# Patient Record
Sex: MALE | Race: WHITE | NOT HISPANIC OR LATINO | Employment: OTHER | URBAN - METROPOLITAN AREA
[De-identification: names, ages, dates, MRNs, and addresses within clinical notes are randomized per-mention and may not be internally consistent; named-entity substitution may affect disease eponyms.]

---

## 2017-06-19 ENCOUNTER — INITIAL CONSULT (OUTPATIENT)
Dept: SURGERY | Facility: CLINIC | Age: 67
End: 2017-06-19
Payer: COMMERCIAL

## 2017-06-19 ENCOUNTER — OFFICE VISIT (OUTPATIENT)
Dept: OTOLARYNGOLOGY | Facility: CLINIC | Age: 67
End: 2017-06-19
Payer: COMMERCIAL

## 2017-06-19 VITALS
SYSTOLIC BLOOD PRESSURE: 122 MMHG | TEMPERATURE: 97 F | BODY MASS INDEX: 33.04 KG/M2 | DIASTOLIC BLOOD PRESSURE: 77 MMHG | WEIGHT: 243.63 LBS | HEART RATE: 65 BPM

## 2017-06-19 VITALS
TEMPERATURE: 98 F | HEART RATE: 79 BPM | BODY MASS INDEX: 32.97 KG/M2 | WEIGHT: 243.38 LBS | DIASTOLIC BLOOD PRESSURE: 80 MMHG | SYSTOLIC BLOOD PRESSURE: 124 MMHG | HEIGHT: 72 IN

## 2017-06-19 DIAGNOSIS — I25.10 CORONARY ARTERY DISEASE INVOLVING NATIVE CORONARY ARTERY OF NATIVE HEART WITHOUT ANGINA PECTORIS: ICD-10-CM

## 2017-06-19 DIAGNOSIS — C15.5 MALIGNANT NEOPLASM OF LOWER THIRD OF ESOPHAGUS: ICD-10-CM

## 2017-06-19 DIAGNOSIS — E78.2 MIXED HYPERLIPIDEMIA: ICD-10-CM

## 2017-06-19 DIAGNOSIS — C15.5 MALIGNANT NEOPLASM OF LOWER THIRD OF ESOPHAGUS: Primary | ICD-10-CM

## 2017-06-19 DIAGNOSIS — I10 BENIGN ESSENTIAL HTN: ICD-10-CM

## 2017-06-19 PROBLEM — E11.9 TYPE 2 DIABETES MELLITUS WITHOUT COMPLICATION: Status: ACTIVE | Noted: 2017-06-19

## 2017-06-19 PROCEDURE — 1159F MED LIST DOCD IN RCRD: CPT | Mod: S$GLB,,, | Performed by: SURGERY

## 2017-06-19 PROCEDURE — 99999 PR PBB SHADOW E&M-EST. PATIENT-LVL III: CPT | Mod: PBBFAC,,, | Performed by: NURSE PRACTITIONER

## 2017-06-19 PROCEDURE — 1126F AMNT PAIN NOTED NONE PRSNT: CPT | Mod: S$GLB,,, | Performed by: SURGERY

## 2017-06-19 PROCEDURE — 31575 DIAGNOSTIC LARYNGOSCOPY: CPT | Mod: S$GLB,,, | Performed by: NURSE PRACTITIONER

## 2017-06-19 PROCEDURE — 99999 PR PBB SHADOW E&M-NEW PATIENT-LVL III: CPT | Mod: PBBFAC,,, | Performed by: SURGERY

## 2017-06-19 PROCEDURE — 1126F AMNT PAIN NOTED NONE PRSNT: CPT | Mod: S$GLB,,, | Performed by: NURSE PRACTITIONER

## 2017-06-19 PROCEDURE — 1159F MED LIST DOCD IN RCRD: CPT | Mod: S$GLB,,, | Performed by: NURSE PRACTITIONER

## 2017-06-19 PROCEDURE — 99203 OFFICE O/P NEW LOW 30 MIN: CPT | Mod: 25,S$GLB,, | Performed by: NURSE PRACTITIONER

## 2017-06-19 PROCEDURE — 99204 OFFICE O/P NEW MOD 45 MIN: CPT | Mod: S$GLB,,, | Performed by: SURGERY

## 2017-06-19 RX ORDER — ASPIRIN 81 MG/1
81 TABLET ORAL DAILY
Status: ON HOLD | COMMUNITY
End: 2017-07-24 | Stop reason: HOSPADM

## 2017-06-19 RX ORDER — LISINOPRIL AND HYDROCHLOROTHIAZIDE 12.5; 2 MG/1; MG/1
1 TABLET ORAL DAILY
Status: ON HOLD | COMMUNITY
End: 2017-07-24 | Stop reason: HOSPADM

## 2017-06-19 RX ORDER — AMLODIPINE BESYLATE 5 MG/1
5 TABLET ORAL DAILY
Status: ON HOLD | COMMUNITY
End: 2017-07-24 | Stop reason: HOSPADM

## 2017-06-19 RX ORDER — SAXAGLIPTIN AND METFORMIN HYDROCHLORIDE 2.5; 1 MG/1; MG/1
TABLET, FILM COATED, EXTENDED RELEASE ORAL
Status: ON HOLD | COMMUNITY
Start: 2017-04-04 | End: 2017-07-24 | Stop reason: HOSPADM

## 2017-06-19 RX ORDER — SIMVASTATIN 20 MG/1
20 TABLET, FILM COATED ORAL NIGHTLY
Status: ON HOLD | COMMUNITY
End: 2017-07-24 | Stop reason: HOSPADM

## 2017-06-19 RX ORDER — METOPROLOL SUCCINATE 100 MG/1
100 TABLET, EXTENDED RELEASE ORAL DAILY
COMMUNITY
Start: 2017-03-22 | End: 2017-09-11

## 2017-06-19 RX ORDER — PANTOPRAZOLE SODIUM 40 MG/1
40 TABLET, DELAYED RELEASE ORAL DAILY
Status: ON HOLD | COMMUNITY
End: 2017-07-24 | Stop reason: HOSPADM

## 2017-06-19 RX ORDER — PRASUGREL HYDROCHLORIDE 10 MG/1
TABLET, COATED ORAL
Status: ON HOLD | COMMUNITY
Start: 2017-03-22 | End: 2017-07-24 | Stop reason: HOSPADM

## 2017-06-19 NOTE — LETTER
June 19, 2017      George Padilla MD  [address]  [facility] [address]           [facility] - Head/Neck Surg Onc  [address]  [facility] [address]  Phone: [phone]  Fax: [phone]          Patient: Mikie Walter Jr   MR Number: [id number]   YOB: 1950   Date of Visit: 6/19/2017       Dear Dr. George Padilla:    Thank you for referring Mikie Walter Jr to me for evaluation. Attached you will find relevant portions of my assessment and plan of care.    If you have questions, please do not hesitate to call me. I look forward to following Mikie Walter Jr along with you.    Sincerely,    Shagufta Her, NP    Enclosure  CC:  No Recipients    If you would like to receive this communication electronically, please contact [email or web address] or [phone] to request more information on [employer] access.    For providers and/or their staff who would like to refer a patient to [facility], please contact us through our one-stop-shop provider referral line, [facility] [place], at [phone].    If you feel you have received this communication in error or would no longer like to receive these types of communications, please e-mail [email or web address]

## 2017-06-19 NOTE — PROGRESS NOTES
Subjective:       Patient ID: Mikie Walter Jr is a 66 y.o. male.    Chief Complaint: consult/ check vocal cords    HPI     Mikie Walter Jr is a 66 year old male who was referred by Dr. Padilla for a vocal cord evaluation. He recently completed neoadjuvant chemoradiation therapy for esophageal adenocarcinoma. He is scheduled to undergo esophagectomy with Dr. Padilla in July. He has no voice complaints. He denies hoarse voice. He has esophageal dysphagia, but no pharyngeal dysphagia or odynophagia.     History reviewed. No pertinent past medical history.    History reviewed. No pertinent surgical history.      Current Outpatient Prescriptions:     amlodipine (NORVASC) 5 MG tablet, Take 5 mg by mouth once daily., Disp: , Rfl:     aspirin (ECOTRIN) 81 MG EC tablet, Take 81 mg by mouth once daily., Disp: , Rfl:     EFFIENT 10 mg Tab, , Disp: , Rfl:     KOMBIGLYZE XR 2.5-1,000 mg TM24, , Disp: , Rfl:     liraglutide 0.6 mg/0.1 mL, 18 mg/3 mL, subq PNIJ (VICTOZA 2-RUTH) 0.6 mg/0.1 mL (18 mg/3 mL) PnIj, Inject 0.6 mg into the skin once daily., Disp: , Rfl:     lisinopril-hydrochlorothiazide (PRINZIDE,ZESTORETIC) 20-12.5 mg per tablet, Take 1 tablet by mouth once daily., Disp: , Rfl:     metoprolol succinate (TOPROL-XL) 100 MG 24 hr tablet, , Disp: , Rfl:     pantoprazole (PROTONIX) 40 MG tablet, Take 40 mg by mouth once daily., Disp: , Rfl:     simvastatin (ZOCOR) 20 MG tablet, Take 20 mg by mouth every evening., Disp: , Rfl:     Review of patient's allergies indicates:   Allergen Reactions    Ciprofloxacin Hives and Swelling       Social History     Social History    Marital status:      Spouse name: N/A    Number of children: N/A    Years of education: N/A     Occupational History    Not on file.     Social History Main Topics    Smoking status: Former Smoker     Types: Cigarettes     Quit date: 1/1/1965    Smokeless tobacco: Not on file    Alcohol use 3.6 oz/week     6 Cans of beer per week     Drug use: Unknown    Sexual activity: Not on file     Other Topics Concern    Not on file     Social History Narrative    No narrative on file       History reviewed. No pertinent family history.    Review of Systems   Constitutional: Negative for appetite change, chills, diaphoresis, fatigue, fever and unexpected weight change.   HENT: Negative for congestion, dental problem, drooling, ear discharge, ear pain, facial swelling, hearing loss, mouth sores, nosebleeds, postnasal drip, rhinorrhea, sinus pressure, sneezing, sore throat, tinnitus, trouble swallowing and voice change.    Eyes: Negative for pain, discharge, redness and itching.   Respiratory: Negative for cough and shortness of breath.    Cardiovascular: Negative for chest pain.   Gastrointestinal: Negative for abdominal distention, abdominal pain, diarrhea, nausea and vomiting.   Endocrine: Negative for cold intolerance and heat intolerance.   Genitourinary: Negative for difficulty urinating.   Musculoskeletal: Negative for neck pain and neck stiffness.   Skin: Negative for rash.   Neurological: Negative for dizziness, weakness and headaches.   Hematological: Negative for adenopathy.       Objective:      Physical Exam   Constitutional: He is oriented to person, place, and time. He appears well-developed and well-nourished. He is cooperative. He does not appear ill. No distress.   HENT:   Head: Normocephalic and atraumatic.   Right Ear: Hearing and external ear normal.   Left Ear: Hearing and external ear normal.   Nose: Nose normal. No mucosal edema, rhinorrhea or nasal deformity. No epistaxis.  No foreign bodies. Right sinus exhibits no maxillary sinus tenderness and no frontal sinus tenderness. Left sinus exhibits no maxillary sinus tenderness and no frontal sinus tenderness.   Mouth/Throat: Uvula is midline, oropharynx is clear and moist and mucous membranes are normal. Mucous membranes are not pale, not dry and not cyanotic. He does not have  dentures. No oral lesions. No trismus in the jaw. Normal dentition. No uvula swelling or dental caries. No oropharyngeal exudate, posterior oropharyngeal edema, posterior oropharyngeal erythema or tonsillar abscesses.   Procedure: Flexible laryngoscopy  In order to fully examine the upper aerodigestive tract, including the larynx, in a patient with a hyperactive gag reflex, flexible endoscopy is required.  After explaining the procedure and obtaining verbal consent, a timeout was performed with the patient's participation according to the universal protocol. Both nasal cavities were anesthetized with 4% Xylocaine spray mixed with Matthew-Synephrine. The flexible laryngoscope (#[id number]) was inserted into the nasal cavity and advanced to visualize the nasal cavity, nasopharynx, the posterior oropharynx, hypopharynx, and the endolarynx with the above findings noted. The scope was removed and the procedure terminated. The patient tolerated this procedure well without apparent complication.      FINDINGS  Nasopharynx - the torus is clear. There are no lesions of the posterior wall.   Oropharynx - no lesions of the tongue base. There is no obvious fullness or asymmetry.  Hypopharynx - there are no lesions of the pyriform sinuses or postcricoid region    Larynx - there are no lesions of the supraglottic or glottic larynx. Vocal fold mobility is normal with complete closure.      Eyes: Conjunctivae are normal. Right eye exhibits no discharge. Left eye exhibits no discharge. No scleral icterus.   Neck: Trachea normal, normal range of motion and phonation normal. Neck supple. No JVD present. No tracheal tenderness present. No tracheal deviation present. No thyroid mass and no thyromegaly present.   Salivary glands - there are no lesions or asymmetric findings in the submandibular or parotid glands     Cardiovascular: Normal rate.    Pulmonary/Chest: Effort normal. No stridor. No respiratory distress.   Lymphadenopathy:         Head (right side): No submental, no submandibular, no tonsillar and no preauricular adenopathy present.        Head (left side): No submental, no submandibular, no tonsillar and no preauricular adenopathy present.     He has no cervical adenopathy.   Neurological: He is alert and oriented to person, place, and time. No cranial nerve deficit.   Skin: Skin is warm and dry. No rash noted. He is not diaphoretic. No erythema. No pallor.   Psychiatric: He has a normal mood and affect. His behavior is normal. Thought content normal.   Vitals reviewed.      Assessment:       1. Malignant neoplasm of lower third of esophagus        Plan:       Mikie Walter Jr has normal vocal cord function.  No vocal cord abnormalities are seen on flexible laryngoscopy.  We discussed the possibility of injury to the recurrent laryngeal nerve during surgery.  If he has any issues with his voice post op, he should follow up with our laryngologist, Dr. Frederick Post. He verbalized understanding.  Questions answered. He will RTC prn.

## 2017-06-19 NOTE — PROGRESS NOTES
History & Physical    SUBJECTIVE:     History of Present Illness:  Mr. Walter is a 66 y.o. male with a history of DM, HTN, CAD s/p CABG x 3 in 2011 with subsequent stent placement for bypass failure, kidney cancer s/p left nephrectomy in 11/2016, chronic GERD, and esophageal adenocarcinoma, diagnosed in 10/2016. He is s/p neoadjuvant chemoradiation, last dose of radiation on 4/25/17. He first presented back in 10/2016 with c/o 2 months of progressive dysphagia. EGD/EUS at that time revealed a tumor at the esophagogastric junction, staged T2 by ultrasound.     Today, he is doing quite well. He claims that he has now fully recovered from his radiation therapy, as he is at his normal activity level and going to work each day. His dysphagia has significantly improved since his last EGD on 6/7, as he is now keeping down both solids and liquids, without regurgitation. His appetite has improved, but he has lost a total of 20 pounds since he started neoadjuvant therapy, including 2 pounds in the past 1-2 weeks. However, he claims that this most recent weight loss is not unintentional, as he has started eating a healthier diet due to high blood glucose levels. He denies any fatigue, weakness, pain, or adenopathy.     Chief Complaint   Patient presents with    Consult       Review of patient's allergies indicates:   Allergen Reactions    Ciprofloxacin Hives and Swelling       Current Outpatient Prescriptions   Medication Sig Dispense Refill    amlodipine (NORVASC) 5 MG tablet Take 5 mg by mouth once daily.      aspirin (ECOTRIN) 81 MG EC tablet Take 81 mg by mouth once daily.      EFFIENT 10 mg Tab       KOMBIGLYZE XR 2.5-1,000 mg TM24       liraglutide 0.6 mg/0.1 mL, 18 mg/3 mL, subq PNIJ (VICTOZA 2-RUTH) 0.6 mg/0.1 mL (18 mg/3 mL) PnIj Inject 0.6 mg into the skin once daily.      lisinopril-hydrochlorothiazide (PRINZIDE,ZESTORETIC) 20-12.5 mg per tablet Take 1 tablet by mouth once daily.      metoprolol succinate  (TOPROL-XL) 100 MG 24 hr tablet       pantoprazole (PROTONIX) 40 MG tablet Take 40 mg by mouth once daily.      simvastatin (ZOCOR) 20 MG tablet Take 20 mg by mouth every evening.       No current facility-administered medications for this visit.        No past medical history on file.  No past surgical history on file.  No family history on file.  Social History   Substance Use Topics    Smoking status: Former Smoker     Types: Cigarettes     Quit date: 1/1/1965    Smokeless tobacco: Not on file    Alcohol use 3.6 oz/week     6 Cans of beer per week        Review of Systems:  Review of Systems   Constitutional: Positive for unexpected weight change (20 pounds ). Negative for activity change, appetite change, chills, fatigue and fever.   Eyes: Negative for visual disturbance.   Respiratory: Negative for cough, chest tightness and shortness of breath.    Cardiovascular: Negative for chest pain and palpitations.   Gastrointestinal: Negative for abdominal distention, abdominal pain, constipation, diarrhea, nausea and vomiting.   Genitourinary: Negative for difficulty urinating and dysuria.   Skin: Negative for color change.   Neurological: Negative for dizziness, weakness and light-headedness.   Hematological: Negative for adenopathy.       OBJECTIVE:     Vital Signs (Most Recent)  Temp: 97.8 °F (36.6 °C) (06/19/17 1427)  Pulse: 79 (06/19/17 1427)  BP: 124/80 (06/19/17 1427)  6' (1.829 m)  110.4 kg (243 lb 6.2 oz)     Physical Exam:  Physical Exam   Constitutional: He is oriented to person, place, and time. He appears well-developed and well-nourished. No distress.   HENT:   Head: Normocephalic and atraumatic.   Eyes: Conjunctivae are normal.   Neck: Normal range of motion. Neck supple.   Cardiovascular: Normal rate and regular rhythm.    Pulmonary/Chest: Effort normal and breath sounds normal.   Abdominal: Soft. He exhibits no distension. There is no tenderness. There is no rebound.   Neurological: He is alert  and oriented to person, place, and time.   Skin: Skin is warm and dry.   Psychiatric: He has a normal mood and affect.       Diagnostic Results:  Multiple EGD/EUS reports reviewed - Post neoadjuvant therapy studies show evidence of significant response to treatment.   Most recent CT/PET scans reveal no significant masses or lymphatic involvement.      ASSESSMENT/PLAN:     Pt is a 67 yo M with an esophageal adenocarcinoma, originally described as T2 buy EUS; now status post neoadjuvant chemoradiation.     PLAN:Plan     - We will plan to proceed with a robotic-assisted esophagectomy. Mr. Walter has asked that we delay scheduling this until after July 15th, as his son will be  on that day. We discussed the risks associated with waiting until ~12 weeks after the conclusion radiation therapy, including increased difficulty with the dissection due to post-radiation fibrosis. However, given the importance of the occasion, we feel that it will be acceptable to wait until the earliest possible date after 7/15. He understands this risk, as well as the usual risks associated with the procedure, which we have discussed in detail, and he has agreed to proceed.        I have personally taken the history and examined this patient and agree with the resident's note as stated above.  In addition:  PMH + for HTN, CAD as noted above with CABG followed by PCI and stenting x 4, and non-insulin dependent DM2. His last visit with his cardiologist, Dr Hurley, was in January and he was told he was doing well. He tells me his HTN is very well-controlled, and his Hgb A1c is usually in the 6-7 range. He is quite active physically (> 4 METs) with no angina, CAMACHO, or claudication.   His neoadjuvant treatment has consisted of two cycles of carbo/taxol initially, followed by three concurrent cycles of carbo/taxol with XRT.   His left nephrectomy was performed through a left flank incision.  Outside records and imaging studies personally  reviewed.   His son is getting  in [place] on July 15th.  Overall, I think he is a good candidate for surgical resection and we will plan RATLE immediately after his son's wedding. I have recommended a progressive exercise program, starting now. We will obtain records from Dr Hurley.  Long talk with patient and his wife and son.     Copy Dr Bunn

## 2017-06-19 NOTE — LETTER
June 19, 2017      Jeff Bunn MD  [address]   [address]  [place] [address]           [facility] Surg/S[facility] Onc  [address] [facility] [address] [facility] [address]  Phone: [phone]          Patient: Mikie Walter Jr   MR Number: [id number]   YOB: 1950   Date of Visit: 6/19/2017       Dear Dr. Jeff Bunn:    Thank you for referring Mikie Walter Jr to me for evaluation. Attached you will find relevant portions of my assessment and plan of care.    If you have questions, please do not hesitate to call me. I look forward to following Mikie Walter Jr along with you.    Sincerely,    George Padilla MD    Enclosure  CC:  No Recipients    If you would like to receive this communication electronically, please contact [email or web address] or [phone] to request more information on [employer] Link access.    For providers and/or their staff who would like to refer a patient to [facility], please contact us through our one-stop-shop provider referral line, [facility], at [phone].    If you feel you have received this communication in error or would no longer like to receive these types of communications, please e-mail [email or web address]

## 2017-06-20 DIAGNOSIS — C15.5 MALIGNANT NEOPLASM OF LOWER THIRD OF ESOPHAGUS: Primary | ICD-10-CM

## 2017-07-10 ENCOUNTER — OFFICE VISIT (OUTPATIENT)
Dept: SURGERY | Facility: CLINIC | Age: 67
End: 2017-07-10
Payer: COMMERCIAL

## 2017-07-10 ENCOUNTER — RESEARCH ENCOUNTER (OUTPATIENT)
Dept: RESEARCH | Facility: HOSPITAL | Age: 67
End: 2017-07-10

## 2017-07-10 ENCOUNTER — LAB VISIT (OUTPATIENT)
Dept: LAB | Facility: HOSPITAL | Age: 67
End: 2017-07-10
Attending: SURGERY
Payer: COMMERCIAL

## 2017-07-10 VITALS
BODY MASS INDEX: 32.69 KG/M2 | RESPIRATION RATE: 18 BRPM | WEIGHT: 241.38 LBS | SYSTOLIC BLOOD PRESSURE: 115 MMHG | TEMPERATURE: 97 F | DIASTOLIC BLOOD PRESSURE: 78 MMHG | HEIGHT: 72 IN | HEART RATE: 78 BPM

## 2017-07-10 DIAGNOSIS — I12.9 SECONDARY DM WITH CKD STAGE 4 AND HYPERTENSION: ICD-10-CM

## 2017-07-10 DIAGNOSIS — E13.22 SECONDARY DM WITH CKD STAGE 4 AND HYPERTENSION: ICD-10-CM

## 2017-07-10 DIAGNOSIS — C15.5 MALIGNANT NEOPLASM OF LOWER THIRD OF ESOPHAGUS: ICD-10-CM

## 2017-07-10 DIAGNOSIS — C15.5 MALIGNANT NEOPLASM OF LOWER THIRD OF ESOPHAGUS: Primary | ICD-10-CM

## 2017-07-10 DIAGNOSIS — N18.4 SECONDARY DM WITH CKD STAGE 4 AND HYPERTENSION: ICD-10-CM

## 2017-07-10 LAB
ALBUMIN SERPL BCP-MCNC: 4 G/DL
ALP SERPL-CCNC: 70 U/L
ALT SERPL W/O P-5'-P-CCNC: 17 U/L
ANION GAP SERPL CALC-SCNC: 9 MMOL/L
AST SERPL-CCNC: 17 U/L
BASOPHILS # BLD AUTO: 0.02 K/UL
BASOPHILS NFR BLD: 0.5 %
BILIRUB SERPL-MCNC: 0.5 MG/DL
BUN SERPL-MCNC: 32 MG/DL
CALCIUM SERPL-MCNC: 9.5 MG/DL
CHLORIDE SERPL-SCNC: 103 MMOL/L
CO2 SERPL-SCNC: 26 MMOL/L
CREAT SERPL-MCNC: 2 MG/DL
DIFFERENTIAL METHOD: ABNORMAL
EOSINOPHIL # BLD AUTO: 0.1 K/UL
EOSINOPHIL NFR BLD: 2.5 %
ERYTHROCYTE [DISTWIDTH] IN BLOOD BY AUTOMATED COUNT: 12.7 %
EST. GFR  (AFRICAN AMERICAN): 38.8 ML/MIN/1.73 M^2
EST. GFR  (NON AFRICAN AMERICAN): 33.5 ML/MIN/1.73 M^2
GLUCOSE SERPL-MCNC: 139 MG/DL
HCT VFR BLD AUTO: 38.7 %
HGB BLD-MCNC: 13.3 G/DL
LYMPHOCYTES # BLD AUTO: 1.1 K/UL
LYMPHOCYTES NFR BLD: 27.6 %
MCH RBC QN AUTO: 31.8 PG
MCHC RBC AUTO-ENTMCNC: 34.4 %
MCV RBC AUTO: 93 FL
MONOCYTES # BLD AUTO: 0.4 K/UL
MONOCYTES NFR BLD: 10 %
NEUTROPHILS # BLD AUTO: 2.4 K/UL
NEUTROPHILS NFR BLD: 59.2 %
PLATELET # BLD AUTO: 157 K/UL
PMV BLD AUTO: 9.3 FL
POTASSIUM SERPL-SCNC: 4.6 MMOL/L
PREALB SERPL-MCNC: 34 MG/DL
PROT SERPL-MCNC: 7.5 G/DL
RBC # BLD AUTO: 4.18 M/UL
SODIUM SERPL-SCNC: 138 MMOL/L
WBC # BLD AUTO: 4.02 K/UL

## 2017-07-10 PROCEDURE — 99213 OFFICE O/P EST LOW 20 MIN: CPT | Mod: S$GLB,,, | Performed by: SURGERY

## 2017-07-10 PROCEDURE — 1126F AMNT PAIN NOTED NONE PRSNT: CPT | Mod: S$GLB,,, | Performed by: SURGERY

## 2017-07-10 PROCEDURE — 36415 COLL VENOUS BLD VENIPUNCTURE: CPT

## 2017-07-10 PROCEDURE — 99999 PR PBB SHADOW E&M-EST. PATIENT-LVL III: CPT | Mod: PBBFAC,,, | Performed by: SURGERY

## 2017-07-10 PROCEDURE — 80053 COMPREHEN METABOLIC PANEL: CPT

## 2017-07-10 PROCEDURE — 1159F MED LIST DOCD IN RCRD: CPT | Mod: S$GLB,,, | Performed by: SURGERY

## 2017-07-10 PROCEDURE — 85025 COMPLETE CBC W/AUTO DIFF WBC: CPT

## 2017-07-10 PROCEDURE — 84134 ASSAY OF PREALBUMIN: CPT

## 2017-07-10 NOTE — PROGRESS NOTES
History & Physical  Surgery      SUBJECTIVE:     Chief Complaint/Reason for Admission: Pre Op Visit    History of Present Illness: Mr. Walter is a 66 y.o. male with a history of DM, HTN, CAD s/p CABG x 3 in 2011 with subsequent stent placement for bypass failure, kidney cancer s/p left nephrectomy in 11/2016, chronic GERD, and esophageal adenocarcinoma, diagnosed in 10/2016. He is s/p neoadjuvant chemoradiation, last dose of radiation on 4/25/17. He first presented back in 10/2016 with c/o 2 months of progressive dysphagia. EGD/EUS at that time revealed a tumor at the esophagogastric junction, staged T2 by ultrasound.      Pt states he has been recovering well from his neoadjuvant tx, denies severe symptoms except mild dysphagia without regurgitation. Feeling much better since completing neoadj tx.    Denies Cp, angina, SOB, N/V, D/C, or abdominal pain. Compliant with medications at home.         Current Outpatient Prescriptions on File Prior to Visit   Medication Sig    amlodipine (NORVASC) 5 MG tablet Take 5 mg by mouth once daily.    aspirin (ECOTRIN) 81 MG EC tablet Take 81 mg by mouth once daily.    KOMBIGLYZE XR 2.5-1,000 mg TM24     liraglutide 0.6 mg/0.1 mL, 18 mg/3 mL, subq PNIJ (VICTOZA 2-RUTH) 0.6 mg/0.1 mL (18 mg/3 mL) PnIj Inject 0.6 mg into the skin once daily.    lisinopril-hydrochlorothiazide (PRINZIDE,ZESTORETIC) 20-12.5 mg per tablet Take 1 tablet by mouth once daily.    metoprolol succinate (TOPROL-XL) 100 MG 24 hr tablet     pantoprazole (PROTONIX) 40 MG tablet Take 40 mg by mouth once daily.    simvastatin (ZOCOR) 20 MG tablet Take 20 mg by mouth every evening.    EFFIENT 10 mg Tab      No current facility-administered medications on file prior to visit.        Review of patient's allergies indicates:   Allergen Reactions    Ciprofloxacin Hives and Swelling       No past medical history on file.  No past surgical history on file.  No family history on file.  Social History   Substance  Use Topics    Smoking status: Former Smoker     Types: Cigarettes     Quit date: 1/1/1965    Smokeless tobacco: Not on file    Alcohol use 3.6 oz/week     6 Cans of beer per week        Review of Systems   Constitutional: Negative for appetite change, fatigue and fever.   HENT: Positive for trouble swallowing.    Respiratory: Negative for cough, choking, chest tightness and shortness of breath.    Cardiovascular: Negative for chest pain and palpitations.   Gastrointestinal: Negative for abdominal distention, abdominal pain, constipation, diarrhea, nausea and vomiting.   Skin: Negative for color change and rash.     OBJECTIVE:     Vital Signs (Most Recent)  Temp: 97.2 °F (36.2 °C) (07/10/17 1307)  Pulse: 78 (07/10/17 1307)  Resp: 18 (07/10/17 1307)  BP: 115/78 (07/10/17 1307)    Physical Exam   Constitutional: He is oriented to person, place, and time. He appears well-developed and well-nourished. No distress.   Eyes: EOM are normal. Pupils are equal, round, and reactive to light.   Cardiovascular: Normal rate and regular rhythm.    Pulmonary/Chest: Effort normal and breath sounds normal. No respiratory distress.   Abdominal: Soft. Bowel sounds are normal. He exhibits no distension. There is no tenderness. There is no guarding.   Neurological: He is alert and oriented to person, place, and time.   Skin: Skin is warm and dry.         ASSESSMENT/PLAN:     A/P:  66 w/o man with uT2N0 esophageal adenocarcinoma s/p neoadjuvant chemoradiation    - Schedule for Robotic esophagectomy with Dr. Padilla 7/18/2017  - Pt to see his cardiologist tomorrow for pre operative apt.    Nayan Carlson MD   [phone]  General Surgery [facility]      I have personally taken the history and examined this patient and agree with the resident's note as stated above.  Operative plan discussed with Mr Walter and his wife. They understand and agree

## 2017-07-10 NOTE — PROGRESS NOTES
Pt and one family member were approached by Xiao Fair in clinic regarding participation in [employer]'s [employer] program (IRB#[id number]). Pt was agreeable.   The ICF was reviewed with pt. The discussion included:   - participation is voluntary;   - pt can change his mind about participating at any time;   - if he changes his mind about participation, he can call us at contact info in ICF and we will discard samples remaining;   - samples that have been used prior to his notification will still be included in research;   - specimens collected will include blood, urine and tissue;   - blood and urine will be collected pre-operatively if possible;   - tissue will be collected from Pathology after routine tests have been conducted;   - Dr. Padilla will perform procedure per his usual protocol - participation in [employer] program will not change amount of tissue removed;   - specimens will be stored with unique code that can only be linked to pt by [employer] staff;   - all medical information released to researchers will be stripped of identifiers;   - samples will not be released to outside researchers unless approved by internal committee;   - there is a small risk of loss of confidentiality, but we make every effort to ensure privacy;   - no other physical risks outside of those involved in standard of care procedure.     Pt did not have any questions. Pt willingly and independently signed ICF for [employer]. A copy of signed ICF was given to pt with instructions to call with any questions that may arise or if he should change his mind regarding participation in [employer] program.

## 2017-07-11 ENCOUNTER — TELEPHONE (OUTPATIENT)
Dept: SURGERY | Facility: CLINIC | Age: 67
End: 2017-07-11

## 2017-07-11 NOTE — TELEPHONE ENCOUNTER
Left message on voicemail for pt. to call back      ---- Message from Claude Martinez sent at 7/11/2017 11:00 AM CDT -----  Contact: Swati//Wife  Caller states that she needs to speak with nurse in ref to the paperwork that supposed to be sent over to the pts cardiologists//please call back at [phone]//thank you    Attn: Jennifer  Fax: [phone]

## 2017-07-17 ENCOUNTER — TELEPHONE (OUTPATIENT)
Dept: SURGERY | Facility: CLINIC | Age: 67
End: 2017-07-17

## 2017-07-17 ENCOUNTER — ANESTHESIA EVENT (OUTPATIENT)
Dept: SURGERY | Facility: HOSPITAL | Age: 67
DRG: 328 | End: 2017-07-17
Payer: MEDICARE

## 2017-07-17 DIAGNOSIS — C15.5 MALIGNANT NEOPLASM OF LOWER THIRD OF ESOPHAGUS: Primary | ICD-10-CM

## 2017-07-17 RX ORDER — METOPROLOL TARTRATE 25 MG/1
50 TABLET, FILM COATED ORAL 2 TIMES DAILY
Status: CANCELLED | OUTPATIENT
Start: 2017-07-17

## 2017-07-17 RX ORDER — ENOXAPARIN SODIUM 100 MG/ML
40 INJECTION SUBCUTANEOUS EVERY 24 HOURS
Status: CANCELLED | OUTPATIENT
Start: 2017-07-17

## 2017-07-17 RX ORDER — SODIUM CHLORIDE 9 MG/ML
INJECTION, SOLUTION INTRAVENOUS CONTINUOUS
Status: CANCELLED | OUTPATIENT
Start: 2017-07-17

## 2017-07-18 ENCOUNTER — SURGERY (OUTPATIENT)
Age: 67
End: 2017-07-18

## 2017-07-18 ENCOUNTER — HOSPITAL ENCOUNTER (INPATIENT)
Facility: HOSPITAL | Age: 67
LOS: 6 days | Discharge: HOME HEALTH | DRG: 328 | End: 2017-07-24
Attending: SURGERY | Admitting: SURGERY
Payer: MEDICARE

## 2017-07-18 ENCOUNTER — ANESTHESIA (OUTPATIENT)
Dept: SURGERY | Facility: HOSPITAL | Age: 67
DRG: 328 | End: 2017-07-18
Payer: MEDICARE

## 2017-07-18 DIAGNOSIS — N18.30 CKD (CHRONIC KIDNEY DISEASE), STAGE III: ICD-10-CM

## 2017-07-18 DIAGNOSIS — Z98.890 H/O ESOPHAGECTOMY: ICD-10-CM

## 2017-07-18 DIAGNOSIS — I25.10 CORONARY ARTERY DISEASE INVOLVING NATIVE CORONARY ARTERY OF NATIVE HEART WITHOUT ANGINA PECTORIS: ICD-10-CM

## 2017-07-18 DIAGNOSIS — Z90.49 H/O ESOPHAGECTOMY: ICD-10-CM

## 2017-07-18 DIAGNOSIS — E11.42 TYPE 2 DIABETES MELLITUS WITH POLYNEUROPATHY: ICD-10-CM

## 2017-07-18 DIAGNOSIS — Z78.9 ON ENTERAL NUTRITION: ICD-10-CM

## 2017-07-18 DIAGNOSIS — C15.5 MALIGNANT NEOPLASM OF LOWER THIRD OF ESOPHAGUS: Primary | ICD-10-CM

## 2017-07-18 LAB
ABO + RH BLD: NORMAL
ALBUMIN SERPL BCP-MCNC: 3 G/DL
ALP SERPL-CCNC: 48 U/L
ALT SERPL W/O P-5'-P-CCNC: 108 U/L
ANION GAP SERPL CALC-SCNC: 9 MMOL/L
AST SERPL-CCNC: 117 U/L
BASOPHILS # BLD AUTO: 0.01 K/UL
BASOPHILS NFR BLD: 0.1 %
BILIRUB SERPL-MCNC: 0.4 MG/DL
BLD GP AB SCN CELLS X3 SERPL QL: NORMAL
BUN SERPL-MCNC: 17 MG/DL
CALCIUM SERPL-MCNC: 7.8 MG/DL
CHLORIDE SERPL-SCNC: 106 MMOL/L
CO2 SERPL-SCNC: 21 MMOL/L
CREAT SERPL-MCNC: 2 MG/DL
DIFFERENTIAL METHOD: ABNORMAL
EOSINOPHIL # BLD AUTO: 0 K/UL
EOSINOPHIL NFR BLD: 0 %
ERYTHROCYTE [DISTWIDTH] IN BLOOD BY AUTOMATED COUNT: 12.5 %
EST. GFR  (AFRICAN AMERICAN): 38.8 ML/MIN/1.73 M^2
EST. GFR  (NON AFRICAN AMERICAN): 33.5 ML/MIN/1.73 M^2
GLUCOSE SERPL-MCNC: 142 MG/DL (ref 70–110)
GLUCOSE SERPL-MCNC: 152 MG/DL (ref 70–110)
GLUCOSE SERPL-MCNC: 176 MG/DL (ref 70–110)
GLUCOSE SERPL-MCNC: 185 MG/DL
GLUCOSE SERPL-MCNC: 202 MG/DL (ref 70–110)
GLUCOSE SERPL-MCNC: 204 MG/DL (ref 70–110)
GLUCOSE SERPL-MCNC: 204 MG/DL (ref 70–110)
HCO3 UR-SCNC: 20.5 MMOL/L (ref 24–28)
HCO3 UR-SCNC: 20.7 MMOL/L (ref 24–28)
HCO3 UR-SCNC: 20.9 MMOL/L (ref 24–28)
HCO3 UR-SCNC: 21.1 MMOL/L (ref 24–28)
HCO3 UR-SCNC: 21.2 MMOL/L (ref 24–28)
HCO3 UR-SCNC: 22.3 MMOL/L (ref 24–28)
HCO3 UR-SCNC: 22.4 MMOL/L (ref 24–28)
HCO3 UR-SCNC: 22.5 MMOL/L (ref 24–28)
HCO3 UR-SCNC: 23.4 MMOL/L (ref 24–28)
HCT VFR BLD AUTO: 37.1 %
HCT VFR BLD CALC: 35 %PCV (ref 36–54)
HCT VFR BLD CALC: 36 %PCV (ref 36–54)
HCT VFR BLD CALC: 36 %PCV (ref 36–54)
HCT VFR BLD CALC: 37 %PCV (ref 36–54)
HGB BLD-MCNC: 12.6 G/DL
LDH SERPL L TO P-CCNC: 0.89 MMOL/L (ref 0.36–1.25)
LDH SERPL L TO P-CCNC: 1.83 MMOL/L (ref 0.36–1.25)
LDH SERPL L TO P-CCNC: 1.89 MMOL/L (ref 0.36–1.25)
LYMPHOCYTES # BLD AUTO: 0.5 K/UL
LYMPHOCYTES NFR BLD: 5.5 %
MAGNESIUM SERPL-MCNC: 1.6 MG/DL
MCH RBC QN AUTO: 31.7 PG
MCHC RBC AUTO-ENTMCNC: 34 %
MCV RBC AUTO: 93 FL
MONOCYTES # BLD AUTO: 0.9 K/UL
MONOCYTES NFR BLD: 10.7 %
NEUTROPHILS # BLD AUTO: 6.9 K/UL
NEUTROPHILS NFR BLD: 83.7 %
PCO2 BLDA: 38.3 MMHG (ref 35–45)
PCO2 BLDA: 38.7 MMHG (ref 35–45)
PCO2 BLDA: 39.3 MMHG (ref 35–45)
PCO2 BLDA: 41.3 MMHG (ref 35–45)
PCO2 BLDA: 41.9 MMHG (ref 35–45)
PCO2 BLDA: 42 MMHG (ref 35–45)
PCO2 BLDA: 42.6 MMHG (ref 35–45)
PCO2 BLDA: 42.6 MMHG (ref 35–45)
PCO2 BLDA: 50.8 MMHG (ref 35–45)
PH SMN: 7.27 [PH] (ref 7.35–7.45)
PH SMN: 7.31 [PH] (ref 7.35–7.45)
PH SMN: 7.31 [PH] (ref 7.35–7.45)
PH SMN: 7.33 [PH] (ref 7.35–7.45)
PH SMN: 7.34 [PH] (ref 7.35–7.45)
PHOSPHATE SERPL-MCNC: 3.3 MG/DL
PLATELET # BLD AUTO: 135 K/UL
PMV BLD AUTO: 9.2 FL
PO2 BLDA: 102 MMHG (ref 80–100)
PO2 BLDA: 107 MMHG (ref 80–100)
PO2 BLDA: 110 MMHG (ref 80–100)
PO2 BLDA: 111 MMHG (ref 80–100)
PO2 BLDA: 117 MMHG (ref 80–100)
PO2 BLDA: 120 MMHG (ref 80–100)
PO2 BLDA: 70 MMHG (ref 80–100)
PO2 BLDA: 74 MMHG (ref 80–100)
PO2 BLDA: 89 MMHG (ref 80–100)
POC BE: -3 MMOL/L
POC BE: -4 MMOL/L
POC BE: -5 MMOL/L
POC IONIZED CALCIUM: 1.11 MMOL/L (ref 1.06–1.42)
POC IONIZED CALCIUM: 1.14 MMOL/L (ref 1.06–1.42)
POC IONIZED CALCIUM: 1.16 MMOL/L (ref 1.06–1.42)
POC IONIZED CALCIUM: 1.16 MMOL/L (ref 1.06–1.42)
POC SATURATED O2: 93 % (ref 95–100)
POC SATURATED O2: 94 % (ref 95–100)
POC SATURATED O2: 95 % (ref 95–100)
POC SATURATED O2: 97 % (ref 95–100)
POC SATURATED O2: 98 % (ref 95–100)
POC TCO2: 22 MMOL/L (ref 23–27)
POC TCO2: 24 MMOL/L (ref 23–27)
POC TCO2: 25 MMOL/L (ref 23–27)
POCT GLUCOSE: 133 MG/DL (ref 70–110)
POCT GLUCOSE: 153 MG/DL (ref 70–110)
POCT GLUCOSE: 184 MG/DL (ref 70–110)
POCT GLUCOSE: 203 MG/DL (ref 70–110)
POCT GLUCOSE: 256 MG/DL (ref 70–110)
POCT GLUCOSE: 258 MG/DL (ref 70–110)
POCT GLUCOSE: 263 MG/DL (ref 70–110)
POCT GLUCOSE: 267 MG/DL (ref 70–110)
POTASSIUM BLD-SCNC: 3.6 MMOL/L (ref 3.5–5.1)
POTASSIUM BLD-SCNC: 3.9 MMOL/L (ref 3.5–5.1)
POTASSIUM BLD-SCNC: 3.9 MMOL/L (ref 3.5–5.1)
POTASSIUM BLD-SCNC: 4 MMOL/L (ref 3.5–5.1)
POTASSIUM BLD-SCNC: 4.1 MMOL/L (ref 3.5–5.1)
POTASSIUM BLD-SCNC: 4.1 MMOL/L (ref 3.5–5.1)
POTASSIUM SERPL-SCNC: 4.6 MMOL/L
PROT SERPL-MCNC: 5.9 G/DL
RBC # BLD AUTO: 3.98 M/UL
SAMPLE: ABNORMAL
SODIUM BLD-SCNC: 136 MMOL/L (ref 136–145)
SODIUM BLD-SCNC: 137 MMOL/L (ref 136–145)
SODIUM BLD-SCNC: 138 MMOL/L (ref 136–145)
SODIUM BLD-SCNC: 138 MMOL/L (ref 136–145)
SODIUM SERPL-SCNC: 136 MMOL/L
WBC # BLD AUTO: 8.22 K/UL

## 2017-07-18 PROCEDURE — 86920 COMPATIBILITY TEST SPIN: CPT

## 2017-07-18 PROCEDURE — 0D874ZZ DIVISION OF STOMACH, PYLORUS, PERCUTANEOUS ENDOSCOPIC APPROACH: ICD-10-PCS | Performed by: SURGERY

## 2017-07-18 PROCEDURE — 94760 N-INVAS EAR/PLS OXIMETRY 1: CPT

## 2017-07-18 PROCEDURE — 25000003 PHARM REV CODE 250: Performed by: SURGERY

## 2017-07-18 PROCEDURE — 86901 BLOOD TYPING SEROLOGIC RH(D): CPT

## 2017-07-18 PROCEDURE — 63600175 PHARM REV CODE 636 W HCPCS: Performed by: SURGERY

## 2017-07-18 PROCEDURE — 88342 IMHCHEM/IMCYTCHM 1ST ANTB: CPT | Mod: 26,,,

## 2017-07-18 PROCEDURE — 36000712 HC OR TIME LEV V 1ST 15 MIN: Performed by: SURGERY

## 2017-07-18 PROCEDURE — 43659 UNLISTED LAPS PX STOMACH: CPT | Mod: ,,, | Performed by: SURGERY

## 2017-07-18 PROCEDURE — D9220A PRA ANESTHESIA: Mod: CRNA,,, | Performed by: NURSE ANESTHETIST, CERTIFIED REGISTERED

## 2017-07-18 PROCEDURE — 07BB4ZX EXCISION OF MESENTERIC LYMPHATIC, PERCUTANEOUS ENDOSCOPIC APPROACH, DIAGNOSTIC: ICD-10-PCS | Performed by: SURGERY

## 2017-07-18 PROCEDURE — 88307 TISSUE EXAM BY PATHOLOGIST: CPT | Mod: 26,,,

## 2017-07-18 PROCEDURE — 88331 PATH CONSLTJ SURG 1 BLK 1SPC: CPT | Mod: 26,,,

## 2017-07-18 PROCEDURE — 86900 BLOOD TYPING SEROLOGIC ABO: CPT

## 2017-07-18 PROCEDURE — 32674 THORACOSCOPY LYMPH NODE EXC: CPT | Mod: ,,, | Performed by: SURGERY

## 2017-07-18 PROCEDURE — 36620 INSERTION CATHETER ARTERY: CPT | Mod: 59,,, | Performed by: ANESTHESIOLOGY

## 2017-07-18 PROCEDURE — 88341 IMHCHEM/IMCYTCHM EA ADD ANTB: CPT | Mod: 26,,,

## 2017-07-18 PROCEDURE — 44015 INSERT NEEDLE CATH BOWEL: CPT | Mod: ,,, | Performed by: SURGERY

## 2017-07-18 PROCEDURE — 0DNW4ZZ RELEASE PERITONEUM, PERCUTANEOUS ENDOSCOPIC APPROACH: ICD-10-PCS | Performed by: SURGERY

## 2017-07-18 PROCEDURE — 37000009 HC ANESTHESIA EA ADD 15 MINS: Performed by: SURGERY

## 2017-07-18 PROCEDURE — D9220A PRA ANESTHESIA: Mod: ANES,,, | Performed by: ANESTHESIOLOGY

## 2017-07-18 PROCEDURE — 36415 COLL VENOUS BLD VENIPUNCTURE: CPT

## 2017-07-18 PROCEDURE — C1729 CATH, DRAINAGE: HCPCS | Performed by: SURGERY

## 2017-07-18 PROCEDURE — 8E0W4CZ ROBOTIC ASSISTED PROCEDURE OF TRUNK REGION, PERCUTANEOUS ENDOSCOPIC APPROACH: ICD-10-PCS | Performed by: SURGERY

## 2017-07-18 PROCEDURE — 83735 ASSAY OF MAGNESIUM: CPT

## 2017-07-18 PROCEDURE — 0DHA4UZ INSERTION OF FEEDING DEVICE INTO JEJUNUM, PERCUTANEOUS ENDOSCOPIC APPROACH: ICD-10-PCS | Performed by: SURGERY

## 2017-07-18 PROCEDURE — 63600175 PHARM REV CODE 636 W HCPCS

## 2017-07-18 PROCEDURE — 27000221 HC OXYGEN, UP TO 24 HOURS

## 2017-07-18 PROCEDURE — 71000039 HC RECOVERY, EACH ADD'L HOUR: Performed by: SURGERY

## 2017-07-18 PROCEDURE — 37000008 HC ANESTHESIA 1ST 15 MINUTES: Performed by: SURGERY

## 2017-07-18 PROCEDURE — 25000003 PHARM REV CODE 250: Performed by: ANESTHESIOLOGY

## 2017-07-18 PROCEDURE — 63600175 PHARM REV CODE 636 W HCPCS: Performed by: ANESTHESIOLOGY

## 2017-07-18 PROCEDURE — 88309 TISSUE EXAM BY PATHOLOGIST: CPT | Mod: 26,,,

## 2017-07-18 PROCEDURE — 71000033 HC RECOVERY, INTIAL HOUR: Performed by: SURGERY

## 2017-07-18 PROCEDURE — 88341 IMHCHEM/IMCYTCHM EA ADD ANTB: CPT

## 2017-07-18 PROCEDURE — 43289 UNLISTED LAPS PX ESOPH: CPT | Mod: ,,, | Performed by: SURGERY

## 2017-07-18 PROCEDURE — C1894 INTRO/SHEATH, NON-LASER: HCPCS | Performed by: SURGERY

## 2017-07-18 PROCEDURE — 20600001 HC STEP DOWN PRIVATE ROOM

## 2017-07-18 PROCEDURE — 0D114Z6 BYPASS UPPER ESOPHAGUS TO STOMACH, PERCUTANEOUS ENDOSCOPIC APPROACH: ICD-10-PCS | Performed by: SURGERY

## 2017-07-18 PROCEDURE — 85025 COMPLETE CBC W/AUTO DIFF WBC: CPT

## 2017-07-18 PROCEDURE — 94799 UNLISTED PULMONARY SVC/PX: CPT

## 2017-07-18 PROCEDURE — 32601 THORACOSCOPY DIAGNOSTIC: CPT | Mod: ,,, | Performed by: SURGERY

## 2017-07-18 PROCEDURE — 82962 GLUCOSE BLOOD TEST: CPT | Performed by: SURGERY

## 2017-07-18 PROCEDURE — 27201423 OPTIME MED/SURG SUP & DEVICES STERILE SUPPLY: Performed by: SURGERY

## 2017-07-18 PROCEDURE — 27201037 HC PRESSURE MONITORING SET UP

## 2017-07-18 PROCEDURE — 36000713 HC OR TIME LEV V EA ADD 15 MIN: Performed by: SURGERY

## 2017-07-18 PROCEDURE — 0DT24ZZ RESECTION OF MIDDLE ESOPHAGUS, PERCUTANEOUS ENDOSCOPIC APPROACH: ICD-10-PCS | Performed by: SURGERY

## 2017-07-18 PROCEDURE — 83036 HEMOGLOBIN GLYCOSYLATED A1C: CPT

## 2017-07-18 PROCEDURE — 07B74ZX EXCISION OF THORAX LYMPHATIC, PERCUTANEOUS ENDOSCOPIC APPROACH, DIAGNOSTIC: ICD-10-PCS | Performed by: SURGERY

## 2017-07-18 PROCEDURE — 84100 ASSAY OF PHOSPHORUS: CPT

## 2017-07-18 PROCEDURE — 80053 COMPREHEN METABOLIC PANEL: CPT

## 2017-07-18 RX ORDER — ACETAMINOPHEN 10 MG/ML
INJECTION, SOLUTION INTRAVENOUS
Status: DISPENSED
Start: 2017-07-18 | End: 2017-07-19

## 2017-07-18 RX ORDER — METOPROLOL TARTRATE 25 MG/1
50 TABLET, FILM COATED ORAL 2 TIMES DAILY
Status: DISCONTINUED | OUTPATIENT
Start: 2017-07-18 | End: 2017-07-18

## 2017-07-18 RX ORDER — ENOXAPARIN SODIUM 100 MG/ML
40 INJECTION SUBCUTANEOUS EVERY 24 HOURS
Status: DISCONTINUED | OUTPATIENT
Start: 2017-07-18 | End: 2017-07-18

## 2017-07-18 RX ORDER — SODIUM CHLORIDE 0.9 % (FLUSH) 0.9 %
3 SYRINGE (ML) INJECTION
Status: DISCONTINUED | OUTPATIENT
Start: 2017-07-18 | End: 2017-07-18 | Stop reason: HOSPADM

## 2017-07-18 RX ORDER — MIDAZOLAM HYDROCHLORIDE 1 MG/ML
INJECTION, SOLUTION INTRAMUSCULAR; INTRAVENOUS
Status: DISCONTINUED | OUTPATIENT
Start: 2017-07-18 | End: 2017-07-18

## 2017-07-18 RX ORDER — HYDROMORPHONE HCL IN 0.9% NACL 6 MG/30 ML
PATIENT CONTROLLED ANALGESIA SYRINGE INTRAVENOUS
Status: COMPLETED
Start: 2017-07-18 | End: 2017-07-18

## 2017-07-18 RX ORDER — SODIUM CHLORIDE 9 MG/ML
INJECTION, SOLUTION INTRAVENOUS CONTINUOUS
Status: DISCONTINUED | OUTPATIENT
Start: 2017-07-18 | End: 2017-07-18

## 2017-07-18 RX ORDER — LIDOCAINE HCL/PF 100 MG/5ML
SYRINGE (ML) INTRAVENOUS
Status: DISCONTINUED | OUTPATIENT
Start: 2017-07-18 | End: 2017-07-18

## 2017-07-18 RX ORDER — ACETAMINOPHEN 10 MG/ML
1000 INJECTION, SOLUTION INTRAVENOUS EVERY 8 HOURS
Status: DISCONTINUED | OUTPATIENT
Start: 2017-07-18 | End: 2017-07-19

## 2017-07-18 RX ORDER — ONDANSETRON 2 MG/ML
INJECTION INTRAMUSCULAR; INTRAVENOUS
Status: DISCONTINUED | OUTPATIENT
Start: 2017-07-18 | End: 2017-07-18

## 2017-07-18 RX ORDER — HYDROMORPHONE HCL IN 0.9% NACL 6 MG/30 ML
PATIENT CONTROLLED ANALGESIA SYRINGE INTRAVENOUS CONTINUOUS
Status: DISCONTINUED | OUTPATIENT
Start: 2017-07-18 | End: 2017-07-21

## 2017-07-18 RX ORDER — GLYCOPYRROLATE 0.2 MG/ML
INJECTION INTRAMUSCULAR; INTRAVENOUS
Status: DISCONTINUED | OUTPATIENT
Start: 2017-07-18 | End: 2017-07-18

## 2017-07-18 RX ORDER — PROPOFOL 10 MG/ML
VIAL (ML) INTRAVENOUS
Status: DISCONTINUED | OUTPATIENT
Start: 2017-07-18 | End: 2017-07-18

## 2017-07-18 RX ORDER — CEFOXITIN SODIUM 2 G/50ML
2 INJECTION, SOLUTION INTRAVENOUS
Status: COMPLETED | OUTPATIENT
Start: 2017-07-18 | End: 2017-07-19

## 2017-07-18 RX ORDER — PHENYLEPHRINE HYDROCHLORIDE 10 MG/ML
INJECTION INTRAVENOUS
Status: DISCONTINUED | OUTPATIENT
Start: 2017-07-18 | End: 2017-07-18

## 2017-07-18 RX ORDER — ACETAMINOPHEN 10 MG/ML
INJECTION, SOLUTION INTRAVENOUS
Status: DISCONTINUED | OUTPATIENT
Start: 2017-07-18 | End: 2017-07-18

## 2017-07-18 RX ORDER — ENOXAPARIN SODIUM 100 MG/ML
40 INJECTION SUBCUTANEOUS
Status: DISCONTINUED | OUTPATIENT
Start: 2017-07-19 | End: 2017-07-24 | Stop reason: HOSPADM

## 2017-07-18 RX ORDER — HYDROMORPHONE HYDROCHLORIDE 1 MG/ML
0.2 INJECTION, SOLUTION INTRAMUSCULAR; INTRAVENOUS; SUBCUTANEOUS EVERY 5 MIN PRN
Status: DISCONTINUED | OUTPATIENT
Start: 2017-07-18 | End: 2017-07-18 | Stop reason: HOSPADM

## 2017-07-18 RX ORDER — CEFOXITIN SODIUM 2 G/50ML
2 INJECTION, SOLUTION INTRAVENOUS ONCE
Status: COMPLETED | OUTPATIENT
Start: 2017-07-18 | End: 2017-07-18

## 2017-07-18 RX ORDER — SUCCINYLCHOLINE CHLORIDE 20 MG/ML
INJECTION INTRAMUSCULAR; INTRAVENOUS
Status: DISCONTINUED | OUTPATIENT
Start: 2017-07-18 | End: 2017-07-18

## 2017-07-18 RX ORDER — BUPIVACAINE HYDROCHLORIDE 5 MG/ML
INJECTION, SOLUTION EPIDURAL; INTRACAUDAL
Status: DISCONTINUED | OUTPATIENT
Start: 2017-07-18 | End: 2017-07-18 | Stop reason: HOSPADM

## 2017-07-18 RX ORDER — NALOXONE HCL 0.4 MG/ML
0.02 VIAL (ML) INJECTION
Status: DISCONTINUED | OUTPATIENT
Start: 2017-07-18 | End: 2017-07-21

## 2017-07-18 RX ORDER — METOPROLOL TARTRATE 1 MG/ML
5 INJECTION, SOLUTION INTRAVENOUS EVERY 6 HOURS
Status: DISCONTINUED | OUTPATIENT
Start: 2017-07-18 | End: 2017-07-21

## 2017-07-18 RX ORDER — NEOSTIGMINE METHYLSULFATE 1 MG/ML
INJECTION, SOLUTION INTRAVENOUS
Status: DISCONTINUED | OUTPATIENT
Start: 2017-07-18 | End: 2017-07-18

## 2017-07-18 RX ORDER — SODIUM CHLORIDE 9 MG/ML
INJECTION, SOLUTION INTRAVENOUS CONTINUOUS
Status: DISCONTINUED | OUTPATIENT
Start: 2017-07-18 | End: 2017-07-19

## 2017-07-18 RX ORDER — NAPROXEN SODIUM 220 MG/1
81 TABLET, FILM COATED ORAL DAILY
Status: DISCONTINUED | OUTPATIENT
Start: 2017-07-19 | End: 2017-07-19

## 2017-07-18 RX ORDER — FENTANYL CITRATE 50 UG/ML
INJECTION, SOLUTION INTRAMUSCULAR; INTRAVENOUS
Status: DISCONTINUED | OUTPATIENT
Start: 2017-07-18 | End: 2017-07-18

## 2017-07-18 RX ORDER — KETAMINE HYDROCHLORIDE 100 MG/ML
INJECTION, SOLUTION INTRAMUSCULAR; INTRAVENOUS
Status: DISCONTINUED | OUTPATIENT
Start: 2017-07-18 | End: 2017-07-18

## 2017-07-18 RX ORDER — DIPHENHYDRAMINE HYDROCHLORIDE 50 MG/ML
12.5 INJECTION INTRAMUSCULAR; INTRAVENOUS EVERY 4 HOURS PRN
Status: DISCONTINUED | OUTPATIENT
Start: 2017-07-18 | End: 2017-07-21

## 2017-07-18 RX ORDER — IPRATROPIUM BROMIDE AND ALBUTEROL SULFATE 2.5; .5 MG/3ML; MG/3ML
3 SOLUTION RESPIRATORY (INHALATION)
Status: COMPLETED | OUTPATIENT
Start: 2017-07-19 | End: 2017-07-20

## 2017-07-18 RX ORDER — CISATRACURIUM BESYLATE 10 MG/ML
INJECTION, SOLUTION INTRAVENOUS
Status: DISCONTINUED | OUTPATIENT
Start: 2017-07-18 | End: 2017-07-18

## 2017-07-18 RX ADMIN — EPHEDRINE SULFATE 5 MG: 50 INJECTION, SOLUTION INTRAMUSCULAR; INTRAVENOUS; SUBCUTANEOUS at 09:07

## 2017-07-18 RX ADMIN — CISATRACURIUM BESYLATE 4 MG: 10 INJECTION INTRAVENOUS at 01:07

## 2017-07-18 RX ADMIN — PHENYLEPHRINE HYDROCHLORIDE 200 MCG: 10 INJECTION INTRAVENOUS at 10:07

## 2017-07-18 RX ADMIN — NEOSTIGMINE METHYLSULFATE 5 MG: 1 INJECTION INTRAVENOUS at 05:07

## 2017-07-18 RX ADMIN — CEFOXITIN SODIUM 2 G: 2 INJECTION, SOLUTION INTRAVENOUS at 10:07

## 2017-07-18 RX ADMIN — FENTANYL CITRATE 50 MCG: 50 INJECTION, SOLUTION INTRAMUSCULAR; INTRAVENOUS at 05:07

## 2017-07-18 RX ADMIN — Medication: at 06:07

## 2017-07-18 RX ADMIN — SODIUM CHLORIDE, SODIUM GLUCONATE, SODIUM ACETATE, POTASSIUM CHLORIDE, MAGNESIUM CHLORIDE, SODIUM PHOSPHATE, DIBASIC, AND POTASSIUM PHOSPHATE: .53; .5; .37; .037; .03; .012; .00082 INJECTION, SOLUTION INTRAVENOUS at 04:07

## 2017-07-18 RX ADMIN — KETAMINE HYDROCHLORIDE 10 MG: 100 INJECTION, SOLUTION, CONCENTRATE INTRAMUSCULAR; INTRAVENOUS at 09:07

## 2017-07-18 RX ADMIN — SUCCINYLCHOLINE CHLORIDE 140 MG: 20 INJECTION, SOLUTION INTRAMUSCULAR; INTRAVENOUS at 07:07

## 2017-07-18 RX ADMIN — CEFOXITIN SODIUM 2 G: 2 INJECTION, SOLUTION INTRAVENOUS at 12:07

## 2017-07-18 RX ADMIN — CISATRACURIUM BESYLATE 4 MG: 10 INJECTION INTRAVENOUS at 02:07

## 2017-07-18 RX ADMIN — CEFOXITIN SODIUM 2 G: 2 INJECTION, SOLUTION INTRAVENOUS at 04:07

## 2017-07-18 RX ADMIN — CISATRACURIUM BESYLATE 4 MG: 10 INJECTION INTRAVENOUS at 11:07

## 2017-07-18 RX ADMIN — EPHEDRINE SULFATE 10 MG: 50 INJECTION, SOLUTION INTRAMUSCULAR; INTRAVENOUS; SUBCUTANEOUS at 01:07

## 2017-07-18 RX ADMIN — PHENYLEPHRINE HYDROCHLORIDE 200 MCG: 10 INJECTION INTRAVENOUS at 01:07

## 2017-07-18 RX ADMIN — SODIUM CHLORIDE 4 UNITS/HR: 9 INJECTION, SOLUTION INTRAVENOUS at 08:07

## 2017-07-18 RX ADMIN — KETAMINE HYDROCHLORIDE 10 MG: 100 INJECTION, SOLUTION, CONCENTRATE INTRAMUSCULAR; INTRAVENOUS at 10:07

## 2017-07-18 RX ADMIN — EPHEDRINE SULFATE 10 MG: 50 INJECTION, SOLUTION INTRAMUSCULAR; INTRAVENOUS; SUBCUTANEOUS at 10:07

## 2017-07-18 RX ADMIN — SODIUM CHLORIDE: 0.9 INJECTION, SOLUTION INTRAVENOUS at 06:07

## 2017-07-18 RX ADMIN — CISATRACURIUM BESYLATE 4 MG: 10 INJECTION INTRAVENOUS at 07:07

## 2017-07-18 RX ADMIN — CISATRACURIUM BESYLATE 4 MG: 10 INJECTION INTRAVENOUS at 10:07

## 2017-07-18 RX ADMIN — CISATRACURIUM BESYLATE 10 MG: 10 INJECTION INTRAVENOUS at 07:07

## 2017-07-18 RX ADMIN — KETAMINE HYDROCHLORIDE 10 MG: 100 INJECTION, SOLUTION, CONCENTRATE INTRAMUSCULAR; INTRAVENOUS at 12:07

## 2017-07-18 RX ADMIN — ENOXAPARIN SODIUM 40 MG: 100 INJECTION SUBCUTANEOUS at 06:07

## 2017-07-18 RX ADMIN — KETAMINE HYDROCHLORIDE 10 MG: 100 INJECTION, SOLUTION, CONCENTRATE INTRAMUSCULAR; INTRAVENOUS at 11:07

## 2017-07-18 RX ADMIN — KETAMINE HYDROCHLORIDE 50 MG: 100 INJECTION, SOLUTION, CONCENTRATE INTRAMUSCULAR; INTRAVENOUS at 08:07

## 2017-07-18 RX ADMIN — GLYCOPYRROLATE 0.6 MG: 0.2 INJECTION, SOLUTION INTRAMUSCULAR; INTRAVENOUS at 05:07

## 2017-07-18 RX ADMIN — CISATRACURIUM BESYLATE 4 MG: 10 INJECTION INTRAVENOUS at 12:07

## 2017-07-18 RX ADMIN — CISATRACURIUM BESYLATE 4 MG: 10 INJECTION INTRAVENOUS at 09:07

## 2017-07-18 RX ADMIN — FENTANYL CITRATE 150 MCG: 50 INJECTION, SOLUTION INTRAMUSCULAR; INTRAVENOUS at 07:07

## 2017-07-18 RX ADMIN — BUPIVACAINE 80 MG: 13.3 INJECTION, SUSPENSION, LIPOSOMAL INFILTRATION at 05:07

## 2017-07-18 RX ADMIN — ACETAMINOPHEN 1000 MG: 10 INJECTION, SOLUTION INTRAVENOUS at 06:07

## 2017-07-18 RX ADMIN — PHENYLEPHRINE HYDROCHLORIDE 100 MCG: 10 INJECTION INTRAVENOUS at 08:07

## 2017-07-18 RX ADMIN — CISATRACURIUM BESYLATE 2 MG: 10 INJECTION INTRAVENOUS at 07:07

## 2017-07-18 RX ADMIN — ONDANSETRON 4 MG: 2 INJECTION INTRAMUSCULAR; INTRAVENOUS at 05:07

## 2017-07-18 RX ADMIN — ACETAMINOPHEN 1000 MG: 10 INJECTION, SOLUTION INTRAVENOUS at 12:07

## 2017-07-18 RX ADMIN — FENTANYL CITRATE 100 MCG: 50 INJECTION, SOLUTION INTRAMUSCULAR; INTRAVENOUS at 04:07

## 2017-07-18 RX ADMIN — FENTANYL CITRATE 100 MCG: 50 INJECTION, SOLUTION INTRAMUSCULAR; INTRAVENOUS at 01:07

## 2017-07-18 RX ADMIN — LIDOCAINE HYDROCHLORIDE 100 MG: 20 INJECTION, SOLUTION INTRAVENOUS at 07:07

## 2017-07-18 RX ADMIN — EPHEDRINE SULFATE 10 MG: 50 INJECTION, SOLUTION INTRAMUSCULAR; INTRAVENOUS; SUBCUTANEOUS at 03:07

## 2017-07-18 RX ADMIN — PHENYLEPHRINE HYDROCHLORIDE 100 MCG: 10 INJECTION INTRAVENOUS at 09:07

## 2017-07-18 RX ADMIN — EPHEDRINE SULFATE 25 MG: 50 INJECTION, SOLUTION INTRAMUSCULAR; INTRAVENOUS; SUBCUTANEOUS at 12:07

## 2017-07-18 RX ADMIN — PHENYLEPHRINE HYDROCHLORIDE 100 MCG: 10 INJECTION INTRAVENOUS at 11:07

## 2017-07-18 RX ADMIN — CEFOXITIN SODIUM 2 G: 2 INJECTION, SOLUTION INTRAVENOUS at 08:07

## 2017-07-18 RX ADMIN — EPHEDRINE SULFATE 10 MG: 50 INJECTION, SOLUTION INTRAMUSCULAR; INTRAVENOUS; SUBCUTANEOUS at 12:07

## 2017-07-18 RX ADMIN — MIDAZOLAM HYDROCHLORIDE 2 MG: 1 INJECTION, SOLUTION INTRAMUSCULAR; INTRAVENOUS at 07:07

## 2017-07-18 RX ADMIN — KETAMINE HYDROCHLORIDE 10 MG: 100 INJECTION, SOLUTION, CONCENTRATE INTRAMUSCULAR; INTRAVENOUS at 01:07

## 2017-07-18 RX ADMIN — CEFOXITIN SODIUM 2 G: 2 INJECTION, SOLUTION INTRAVENOUS at 02:07

## 2017-07-18 RX ADMIN — CISATRACURIUM BESYLATE 4 MG: 10 INJECTION INTRAVENOUS at 04:07

## 2017-07-18 RX ADMIN — PROPOFOL 120 MG: 10 INJECTION, EMULSION INTRAVENOUS at 07:07

## 2017-07-18 RX ADMIN — BUPIVACAINE HYDROCHLORIDE 30 ML: 5 INJECTION, SOLUTION EPIDURAL; INTRACAUDAL; PERINEURAL at 11:07

## 2017-07-18 RX ADMIN — SODIUM CHLORIDE, SODIUM GLUCONATE, SODIUM ACETATE, POTASSIUM CHLORIDE, MAGNESIUM CHLORIDE, SODIUM PHOSPHATE, DIBASIC, AND POTASSIUM PHOSPHATE: .53; .5; .37; .037; .03; .012; .00082 INJECTION, SOLUTION INTRAVENOUS at 08:07

## 2017-07-18 RX ADMIN — FENTANYL CITRATE 25 MCG: 50 INJECTION, SOLUTION INTRAMUSCULAR; INTRAVENOUS at 05:07

## 2017-07-18 RX ADMIN — METOPROLOL TARTRATE 5 MG: 5 INJECTION INTRAVENOUS at 06:07

## 2017-07-18 NOTE — ANESTHESIA PREPROCEDURE EVALUATION
07/18/2017  Mikie Walter Jr is a 66 y.o., male.    Anesthesia Evaluation    I have reviewed the Patient Summary Reports.    I have reviewed the Nursing Notes.   I have reviewed the Medications.     Review of Systems  Anesthesia Hx:  No problems with previous Anesthesia    Hematology/Oncology:  Hematology Normal   Oncology Normal     EENT/Dental:EENT/Dental Normal   Cardiovascular:   Hypertension CAD asymptomatic     Pulmonary:  Pulmonary Normal    Renal/:   Chronic Renal Disease, CRI    Hepatic/GI:   GERD, well controlled    Musculoskeletal:  Musculoskeletal Normal    Neurological:  Neurology Normal    Endocrine:   Diabetes, well controlled, type 2    Dermatological:  Skin Normal    Psych:  Psychiatric Normal              Anesthesia Plan  Type of Anesthesia, risks & benefits discussed:  Anesthesia Type:  general  Patient's Preference: General  Intra-op Monitoring Plan: standard ASA monitors and arterial line  Intra-op Monitoring Plan Comments:   Post Op Pain Control Plan: multimodal analgesia and IV/PO Opioids PRN  Post Op Pain Control Plan Comments:   Induction:   IV  Beta Blocker:  Patient is on a Beta-Blocker and has received one dose within the past 24 hours (No further documentation required).       Informed Consent: Patient understands risks and agrees with Anesthesia plan.  Questions answered. Anesthesia consent signed with patient.  ASA Score: 3     Day of Surgery Review of History & Physical:    H&P update referred to the surgeon.     Anesthesia Plan Notes: Discussed plan for general endotracheal anesthesia, pt understands and agrees with plan        Ready For Surgery From Anesthesia Perspective.

## 2017-07-18 NOTE — BRIEF OP NOTE
Preop Dx: Esophageal adenocarcinoma, s/p neoadjuvant chemotherapy and radiation therapy  Postop Dx: same  Surgeon: Randy  Assistant: Jennifer  Operative Procedure: Total thoracic esophagectomy, proximal gastrectomy, left cervical esophagogastrostomy, mediastinal and abdominal lymphadenectomy via right chest, abdominal and left neck approach; robotic-assisted, Witzel jejunostomy  Brief Detail: No metastatic disease. Side-side JOSTIN stapled cervical esophagogastrostomy; bilateral chest tubes; 12 Fr Jtube   EBL: 75 ccs  CPT code:

## 2017-07-18 NOTE — ANESTHESIA PROCEDURE NOTES
Arterial    Diagnosis: Esophageal mass    Patient location during procedure: done in OR  Procedure start time: 7/18/2017 7:32 AM  Timeout: 7/18/2017 7:31 AM  Procedure end time: 7/18/2017 7:35 AM  Staffing  Anesthesiologist: NITO FERRARO  Resident/CRNA: FRITZ DANIELS  Performed: resident/CRNA   Anesthesiologist was present at the time of the procedure.  Preanesthetic Checklist  Completed: patient identified, site marked, surgical consent, pre-op evaluation, timeout performed, IV checked, risks and benefits discussed, monitors and equipment checked and anesthesia consent givenArterial  Skin Prep: chlorhexidine gluconate  Local Infiltration: none  Orientation: right  Location: radial  Catheter Size: 20 G  Catheter placement by Anatomical landmarks. Heme positive aspiration all ports.Insertion Attempts: 1  Assessment  Dressing: secured with tape and tegaderm  Patient: Tolerated well

## 2017-07-18 NOTE — PROGRESS NOTES
"Notified Dr. Mitchell, pt's blood glucose 258. Pt reported "only blood pressure medications this morning." Will come to see pt. No further instructions/orders given at this time.  "

## 2017-07-18 NOTE — H&P (VIEW-ONLY)
History & Physical  Surgery      SUBJECTIVE:     Chief Complaint/Reason for Admission: Pre Op Visit    History of Present Illness: Mr. Walter is a 66 y.o. male with a history of DM, HTN, CAD s/p CABG x 3 in 2011 with subsequent stent placement for bypass failure, kidney cancer s/p left nephrectomy in 11/2016, chronic GERD, and esophageal adenocarcinoma, diagnosed in 10/2016. He is s/p neoadjuvant chemoradiation, last dose of radiation on 4/25/17. He first presented back in 10/2016 with c/o 2 months of progressive dysphagia. EGD/EUS at that time revealed a tumor at the esophagogastric junction, staged T2 by ultrasound.      Pt states he has been recovering well from his neoadjuvant tx, denies severe symptoms except mild dysphagia without regurgitation. Feeling much better since completing neoadj tx.    Denies Cp, angina, SOB, N/V, D/C, or abdominal pain. Compliant with medications at home.         Current Outpatient Prescriptions on File Prior to Visit   Medication Sig    amlodipine (NORVASC) 5 MG tablet Take 5 mg by mouth once daily.    aspirin (ECOTRIN) 81 MG EC tablet Take 81 mg by mouth once daily.    KOMBIGLYZE XR 2.5-1,000 mg TM24     liraglutide 0.6 mg/0.1 mL, 18 mg/3 mL, subq PNIJ (VICTOZA 2-RUTH) 0.6 mg/0.1 mL (18 mg/3 mL) PnIj Inject 0.6 mg into the skin once daily.    lisinopril-hydrochlorothiazide (PRINZIDE,ZESTORETIC) 20-12.5 mg per tablet Take 1 tablet by mouth once daily.    metoprolol succinate (TOPROL-XL) 100 MG 24 hr tablet     pantoprazole (PROTONIX) 40 MG tablet Take 40 mg by mouth once daily.    simvastatin (ZOCOR) 20 MG tablet Take 20 mg by mouth every evening.    EFFIENT 10 mg Tab      No current facility-administered medications on file prior to visit.        Review of patient's allergies indicates:   Allergen Reactions    Ciprofloxacin Hives and Swelling       No past medical history on file.  No past surgical history on file.  No family history on file.  Social History   Substance  Use Topics    Smoking status: Former Smoker     Types: Cigarettes     Quit date: 1/1/1965    Smokeless tobacco: Not on file    Alcohol use 3.6 oz/week     6 Cans of beer per week        Review of Systems   Constitutional: Negative for appetite change, fatigue and fever.   HENT: Positive for trouble swallowing.    Respiratory: Negative for cough, choking, chest tightness and shortness of breath.    Cardiovascular: Negative for chest pain and palpitations.   Gastrointestinal: Negative for abdominal distention, abdominal pain, constipation, diarrhea, nausea and vomiting.   Skin: Negative for color change and rash.     OBJECTIVE:     Vital Signs (Most Recent)  Temp: 97.2 °F (36.2 °C) (07/10/17 1307)  Pulse: 78 (07/10/17 1307)  Resp: 18 (07/10/17 1307)  BP: 115/78 (07/10/17 1307)    Physical Exam   Constitutional: He is oriented to person, place, and time. He appears well-developed and well-nourished. No distress.   Eyes: EOM are normal. Pupils are equal, round, and reactive to light.   Cardiovascular: Normal rate and regular rhythm.    Pulmonary/Chest: Effort normal and breath sounds normal. No respiratory distress.   Abdominal: Soft. Bowel sounds are normal. He exhibits no distension. There is no tenderness. There is no guarding.   Neurological: He is alert and oriented to person, place, and time.   Skin: Skin is warm and dry.         ASSESSMENT/PLAN:     A/P:  66 w/o man with uT2N0 esophageal adenocarcinoma s/p neoadjuvant chemoradiation    - Schedule for Robotic esophagectomy with Dr. Padilla 7/18/2017  - Pt to see his cardiologist tomorrow for pre operative apt.    Nayan Carlson MD   [phone]  General Surgery [facility]      I have personally taken the history and examined this patient and agree with the resident's note as stated above.  Operative plan discussed with Mr Walter and his wife. They understand and agree

## 2017-07-18 NOTE — ANESTHESIA PREPROCEDURE EVALUATION
07/18/2017  Pre-operative evaluation for Procedure(s) (LRB):  ESOPHAGECTOMY-ROBOTIC (N/A)    Mikie Walter Jr is a 66 y.o. male w/ PMH CAD s/p CABG w/ stents, DM2, HTN, here with distal esophageal mass. Going for above listed procedure.     LDA: 18g PIV      Patient Active Problem List   Diagnosis    Malignant neoplasm of lower third of esophagus    Benign essential HTN    Type 2 diabetes mellitus without complication    Coronary artery disease involving native coronary artery    Mixed hyperlipidemia    Secondary DM with CKD stage 4 and hypertension       Review of patient's allergies indicates:   Allergen Reactions    Ciprofloxacin Hives and Swelling        No current facility-administered medications on file prior to encounter.      Current Outpatient Prescriptions on File Prior to Encounter   Medication Sig Dispense Refill    amlodipine (NORVASC) 5 MG tablet Take 5 mg by mouth once daily.      metoprolol succinate (TOPROL-XL) 100 MG 24 hr tablet Take 100 mg by mouth once daily.       pantoprazole (PROTONIX) 40 MG tablet Take 40 mg by mouth once daily.      aspirin (ECOTRIN) 81 MG EC tablet Take 81 mg by mouth once daily.      EFFIENT 10 mg Tab       KOMBIGLYZE XR 2.5-1,000 mg TM24       liraglutide 0.6 mg/0.1 mL, 18 mg/3 mL, subq PNIJ (VICTOZA 2-RUTH) 0.6 mg/0.1 mL (18 mg/3 mL) PnIj Inject 0.6 mg into the skin once daily.      lisinopril-hydrochlorothiazide (PRINZIDE,ZESTORETIC) 20-12.5 mg per tablet Take 1 tablet by mouth once daily.      simvastatin (ZOCOR) 20 MG tablet Take 20 mg by mouth every evening.         Past Surgical History:   Procedure Laterality Date    CORONARY ARTERY BYPASS GRAFT      X 3    CORONARY STENT PLACEMENT      NEPHRECTOMY Left        Social History     Social History    Marital status:      Spouse name: N/A    Number of children: N/A    Years of  education: N/A     Occupational History    Not on file.     Social History Main Topics    Smoking status: Former Smoker     Types: Cigarettes     Quit date: 1/1/1965    Smokeless tobacco: Never Used      Comment: >40 years    Alcohol use 3.6 oz/week     6 Cans of beer per week      Comment: occasionally    Drug use: No    Sexual activity: Not on file     Other Topics Concern    Not on file     Social History Narrative    No narrative on file         Vital Signs Range (Last 24H):  Temp:  [36.8 °C (98.2 °F)]   Pulse:  [72]   Resp:  [18]   BP: (128)/(89)   SpO2:  [100 %]       CBC: No results for input(s): WBC, RBC, HGB, HCT, PLT, MCV, MCH, MCHC in the last 72 hours.    CMP: No results for input(s): NA, K, CL, CO2, BUN, CREATININE, GLU, MG, PHOS, CALCIUM, ALBUMIN, PROT, ALKPHOS, ALT, AST, BILITOT in the last 72 hours.    INR  No results for input(s): INR, PROTIME, APTT in the last 72 hours.    Invalid input(s): PT        Diagnostic Studies:      EKG: None here      2D Echo: None here          Anesthesia Evaluation    I have reviewed the Patient Summary Reports.    I have reviewed the Nursing Notes.   I have reviewed the Medications.     Review of Systems  Anesthesia Hx:  No problems with previous Anesthesia  History of prior surgery of interest to airway management or planning: Denies Family Hx of Anesthesia complications.   Denies Personal Hx of Anesthesia complications.   Hematology/Oncology:  Hematology Normal   Oncology Normal     EENT/Dental:EENT/Dental Normal   Cardiovascular:   Hypertension CAD  CABG/stent     Pulmonary:  Pulmonary Normal    Renal/:   Chronic Renal Disease, CRI    Hepatic/GI:   GERD    Neurological:  Neurology Normal    Endocrine:   Diabetes, type 2        Physical Exam  General:  Well nourished    Airway/Jaw/Neck:  Airway Findings: Mouth Opening: Normal Tongue: Large  General Airway Assessment: Adult  Mallampati: IV  Improves to III with phonation.  TM Distance: Normal, at least 6 cm   Jaw/Neck Findings:  Neck ROM: Normal ROM      Dental:  Dental Findings: Periodontal disease, Severe   Chest/Lungs:  Chest/Lungs Clear    Heart/Vascular:  Heart Findings: Normal       Mental Status:  Mental Status Findings:  Cooperative, Alert and Oriented         Anesthesia Plan  Type of Anesthesia, risks & benefits discussed:  Anesthesia Type:  general  Patient's Preference:   Intra-op Monitoring Plan:   Intra-op Monitoring Plan Comments:   Post Op Pain Control Plan:   Post Op Pain Control Plan Comments:   Induction:   IV  Beta Blocker:  Patient is on a Beta-Blocker and has received one dose within the past 24 hours (No further documentation required).       Informed Consent: Patient understands risks and agrees with Anesthesia plan.  Questions answered. Anesthesia consent signed with patient.  ASA Score: 3     Day of Surgery Review of History & Physical:    H&P update referred to the surgeon.         Ready For Surgery From Anesthesia Perspective.

## 2017-07-18 NOTE — INTERVAL H&P NOTE
The patient has been examined and the H&P has been reviewed:    I concur with the findings and no changes have occurred since H&P was written.   No interval c/o  Vitals:    07/18/17 0557   BP: 128/89   Pulse: 72   Resp: 18   Temp: 98.2 °F (36.8 °C)     Chest clear to ausc  Heart: reg rate, rhythm, and sounds    Operative plan reviewed with patient and family. He understands and agrees.    Anesthesia/Surgery risks, benefits and alternative options discussed and understood by patient/family.          Active Hospital Problems    Diagnosis  POA    Malignant neoplasm of lower third of esophagus [C15.5]  Yes      Resolved Hospital Problems    Diagnosis Date Resolved POA   No resolved problems to display.

## 2017-07-18 NOTE — TRANSFER OF CARE
Anesthesia Transfer of Care Note    Patient: Mikie Walter Jr    Procedure(s) Performed: Procedure(s) (LRB):  ESOPHAGECTOMY-ROBOTIC (N/A)  PLACEMENT-TUBE-JEJUNOSTOMY (N/A)    Patient location: PACU    Anesthesia Type: general    Transport from OR: Transported from OR on 6-10 L/min O2 by face mask with adequate spontaneous ventilation    Post pain: adequate analgesia    Post assessment: no apparent anesthetic complications    Post vital signs: stable    Level of consciousness: responds to stimulation    Nausea/Vomiting: no nausea/vomiting    Complications: none    Transfer of care protocol was followed      Last vitals:   Visit Vitals  /65 (BP Location: Left arm, Patient Position: Lying, BP Method: Automatic)   Pulse 77   Temp 36.7 °C (98.1 °F) (Axillary)   Resp 17   Ht 6' (1.829 m)   Wt 109.3 kg (241 lb)   SpO2 (!) 92%   BMI 32.69 kg/m²

## 2017-07-19 PROBLEM — I12.9 SECONDARY DM WITH CKD STAGE 4 AND HYPERTENSION: Status: RESOLVED | Noted: 2017-07-10 | Resolved: 2017-07-19

## 2017-07-19 PROBLEM — N18.4 SECONDARY DM WITH CKD STAGE 4 AND HYPERTENSION: Status: RESOLVED | Noted: 2017-07-10 | Resolved: 2017-07-19

## 2017-07-19 PROBLEM — Z98.890 H/O ESOPHAGECTOMY: Status: ACTIVE | Noted: 2017-07-19

## 2017-07-19 PROBLEM — E11.42 TYPE 2 DIABETES MELLITUS WITH POLYNEUROPATHY: Status: ACTIVE | Noted: 2017-07-19

## 2017-07-19 PROBLEM — Z90.49 H/O ESOPHAGECTOMY: Status: ACTIVE | Noted: 2017-07-19

## 2017-07-19 PROBLEM — E11.9 TYPE 2 DIABETES MELLITUS WITHOUT COMPLICATION: Status: RESOLVED | Noted: 2017-06-19 | Resolved: 2017-07-19

## 2017-07-19 PROBLEM — N18.30 CKD (CHRONIC KIDNEY DISEASE), STAGE III: Status: ACTIVE | Noted: 2017-07-19

## 2017-07-19 PROBLEM — E13.22 SECONDARY DM WITH CKD STAGE 4 AND HYPERTENSION: Status: RESOLVED | Noted: 2017-07-10 | Resolved: 2017-07-19

## 2017-07-19 LAB
ALBUMIN SERPL BCP-MCNC: 2.7 G/DL
ALP SERPL-CCNC: 47 U/L
ALT SERPL W/O P-5'-P-CCNC: 137 U/L
ANION GAP SERPL CALC-SCNC: 8 MMOL/L
AST SERPL-CCNC: 168 U/L
BASOPHILS # BLD AUTO: 0 K/UL
BASOPHILS NFR BLD: 0 %
BILIRUB SERPL-MCNC: 0.6 MG/DL
BUN SERPL-MCNC: 18 MG/DL
CALCIUM SERPL-MCNC: 7.9 MG/DL
CHLORIDE SERPL-SCNC: 107 MMOL/L
CO2 SERPL-SCNC: 20 MMOL/L
CREAT SERPL-MCNC: 1.7 MG/DL
DIFFERENTIAL METHOD: ABNORMAL
EOSINOPHIL # BLD AUTO: 0 K/UL
EOSINOPHIL NFR BLD: 0 %
ERYTHROCYTE [DISTWIDTH] IN BLOOD BY AUTOMATED COUNT: 12.5 %
EST. GFR  (AFRICAN AMERICAN): 47.2 ML/MIN/1.73 M^2
EST. GFR  (NON AFRICAN AMERICAN): 40.8 ML/MIN/1.73 M^2
ESTIMATED AVG GLUCOSE: 134 MG/DL
GLUCOSE SERPL-MCNC: 176 MG/DL
HBA1C MFR BLD HPLC: 6.3 %
HCT VFR BLD AUTO: 35.2 %
HGB BLD-MCNC: 12.3 G/DL
LYMPHOCYTES # BLD AUTO: 0.3 K/UL
LYMPHOCYTES NFR BLD: 5.3 %
MAGNESIUM SERPL-MCNC: 1.5 MG/DL
MCH RBC QN AUTO: 32.3 PG
MCHC RBC AUTO-ENTMCNC: 34.9 %
MCV RBC AUTO: 92 FL
MONOCYTES # BLD AUTO: 0.5 K/UL
MONOCYTES NFR BLD: 9.4 %
NEUTROPHILS # BLD AUTO: 4.8 K/UL
NEUTROPHILS NFR BLD: 85.1 %
PHOSPHATE SERPL-MCNC: 3.3 MG/DL
PLATELET # BLD AUTO: 113 K/UL
PMV BLD AUTO: 9.2 FL
POCT GLUCOSE: 112 MG/DL (ref 70–110)
POCT GLUCOSE: 147 MG/DL (ref 70–110)
POCT GLUCOSE: 147 MG/DL (ref 70–110)
POCT GLUCOSE: 171 MG/DL (ref 70–110)
POCT GLUCOSE: 172 MG/DL (ref 70–110)
POCT GLUCOSE: 172 MG/DL (ref 70–110)
POCT GLUCOSE: 182 MG/DL (ref 70–110)
POCT GLUCOSE: 184 MG/DL (ref 70–110)
POCT GLUCOSE: 185 MG/DL (ref 70–110)
POCT GLUCOSE: 186 MG/DL (ref 70–110)
POCT GLUCOSE: 190 MG/DL (ref 70–110)
POCT GLUCOSE: 192 MG/DL (ref 70–110)
POCT GLUCOSE: 204 MG/DL (ref 70–110)
POTASSIUM SERPL-SCNC: 4.6 MMOL/L
PROT SERPL-MCNC: 5.4 G/DL
RBC # BLD AUTO: 3.81 M/UL
SODIUM SERPL-SCNC: 135 MMOL/L
WBC # BLD AUTO: 5.64 K/UL

## 2017-07-19 PROCEDURE — 99222 1ST HOSP IP/OBS MODERATE 55: CPT | Mod: ,,, | Performed by: NURSE PRACTITIONER

## 2017-07-19 PROCEDURE — 94799 UNLISTED PULMONARY SVC/PX: CPT

## 2017-07-19 PROCEDURE — 80053 COMPREHEN METABOLIC PANEL: CPT

## 2017-07-19 PROCEDURE — 63600175 PHARM REV CODE 636 W HCPCS: Performed by: NURSE PRACTITIONER

## 2017-07-19 PROCEDURE — 63600175 PHARM REV CODE 636 W HCPCS: Performed by: SURGERY

## 2017-07-19 PROCEDURE — 25000003 PHARM REV CODE 250: Performed by: NURSE PRACTITIONER

## 2017-07-19 PROCEDURE — 36415 COLL VENOUS BLD VENIPUNCTURE: CPT

## 2017-07-19 PROCEDURE — 97161 PT EVAL LOW COMPLEX 20 MIN: CPT

## 2017-07-19 PROCEDURE — 97530 THERAPEUTIC ACTIVITIES: CPT

## 2017-07-19 PROCEDURE — 83735 ASSAY OF MAGNESIUM: CPT

## 2017-07-19 PROCEDURE — 27000221 HC OXYGEN, UP TO 24 HOURS

## 2017-07-19 PROCEDURE — 20600001 HC STEP DOWN PRIVATE ROOM

## 2017-07-19 PROCEDURE — 25000003 PHARM REV CODE 250: Performed by: SURGERY

## 2017-07-19 PROCEDURE — 25000242 PHARM REV CODE 250 ALT 637 W/ HCPCS: Performed by: NURSE PRACTITIONER

## 2017-07-19 PROCEDURE — 85025 COMPLETE CBC W/AUTO DIFF WBC: CPT

## 2017-07-19 PROCEDURE — 84100 ASSAY OF PHOSPHORUS: CPT

## 2017-07-19 PROCEDURE — 25000003 PHARM REV CODE 250: Performed by: STUDENT IN AN ORGANIZED HEALTH CARE EDUCATION/TRAINING PROGRAM

## 2017-07-19 PROCEDURE — 94640 AIRWAY INHALATION TREATMENT: CPT

## 2017-07-19 RX ORDER — DEXTROSE MONOHYDRATE, SODIUM CHLORIDE, AND POTASSIUM CHLORIDE 50; 1.49; 4.5 G/1000ML; G/1000ML; G/1000ML
INJECTION, SOLUTION INTRAVENOUS CONTINUOUS
Status: DISCONTINUED | OUTPATIENT
Start: 2017-07-19 | End: 2017-07-21

## 2017-07-19 RX ORDER — GLUCAGON 1 MG
1 KIT INJECTION
Status: DISCONTINUED | OUTPATIENT
Start: 2017-07-19 | End: 2017-07-20

## 2017-07-19 RX ORDER — INSULIN ASPART 100 [IU]/ML
0-4 INJECTION, SOLUTION INTRAVENOUS; SUBCUTANEOUS
Status: DISCONTINUED | OUTPATIENT
Start: 2017-07-19 | End: 2017-07-20

## 2017-07-19 RX ORDER — IBUPROFEN 200 MG
16 TABLET ORAL
Status: DISCONTINUED | OUTPATIENT
Start: 2017-07-19 | End: 2017-07-20

## 2017-07-19 RX ORDER — ASPIRIN 300 MG/1
150 SUPPOSITORY RECTAL DAILY
Status: DISCONTINUED | OUTPATIENT
Start: 2017-07-19 | End: 2017-07-24 | Stop reason: HOSPADM

## 2017-07-19 RX ORDER — IBUPROFEN 200 MG
24 TABLET ORAL
Status: DISCONTINUED | OUTPATIENT
Start: 2017-07-19 | End: 2017-07-20

## 2017-07-19 RX ORDER — MAGNESIUM SULFATE HEPTAHYDRATE 40 MG/ML
2 INJECTION, SOLUTION INTRAVENOUS ONCE
Status: COMPLETED | OUTPATIENT
Start: 2017-07-19 | End: 2017-07-19

## 2017-07-19 RX ORDER — TAMSULOSIN HYDROCHLORIDE 0.4 MG/1
0.4 CAPSULE ORAL DAILY
Status: DISCONTINUED | OUTPATIENT
Start: 2017-07-19 | End: 2017-07-24 | Stop reason: HOSPADM

## 2017-07-19 RX ORDER — FAMOTIDINE 10 MG/ML
20 INJECTION INTRAVENOUS DAILY
Status: DISCONTINUED | OUTPATIENT
Start: 2017-07-19 | End: 2017-07-24 | Stop reason: HOSPADM

## 2017-07-19 RX ADMIN — METOPROLOL TARTRATE 5 MG: 5 INJECTION INTRAVENOUS at 12:07

## 2017-07-19 RX ADMIN — INSULIN ASPART 1 UNITS: 100 INJECTION, SOLUTION INTRAVENOUS; SUBCUTANEOUS at 07:07

## 2017-07-19 RX ADMIN — METOPROLOL TARTRATE 5 MG: 5 INJECTION INTRAVENOUS at 05:07

## 2017-07-19 RX ADMIN — TAMSULOSIN HYDROCHLORIDE 0.4 MG: 0.4 CAPSULE ORAL at 04:07

## 2017-07-19 RX ADMIN — IPRATROPIUM BROMIDE AND ALBUTEROL SULFATE 3 ML: .5; 3 SOLUTION RESPIRATORY (INHALATION) at 08:07

## 2017-07-19 RX ADMIN — ASPIRIN 150 MG: 300 SUPPOSITORY RECTAL at 01:07

## 2017-07-19 RX ADMIN — MAGNESIUM SULFATE IN WATER 2 G: 40 INJECTION, SOLUTION INTRAVENOUS at 11:07

## 2017-07-19 RX ADMIN — IPRATROPIUM BROMIDE AND ALBUTEROL SULFATE 3 ML: .5; 3 SOLUTION RESPIRATORY (INHALATION) at 07:07

## 2017-07-19 RX ADMIN — INSULIN ASPART 1 UNITS: 100 INJECTION, SOLUTION INTRAVENOUS; SUBCUTANEOUS at 04:07

## 2017-07-19 RX ADMIN — SODIUM CHLORIDE, SODIUM LACTATE, POTASSIUM CHLORIDE, AND CALCIUM CHLORIDE 500 ML: .6; .31; .03; .02 INJECTION, SOLUTION INTRAVENOUS at 03:07

## 2017-07-19 RX ADMIN — ENOXAPARIN SODIUM 40 MG: 100 INJECTION SUBCUTANEOUS at 11:07

## 2017-07-19 RX ADMIN — SODIUM CHLORIDE 0.4 UNITS/HR: 9 INJECTION, SOLUTION INTRAVENOUS at 08:07

## 2017-07-19 RX ADMIN — CEFOXITIN SODIUM 2 G: 2 INJECTION, SOLUTION INTRAVENOUS at 05:07

## 2017-07-19 RX ADMIN — IPRATROPIUM BROMIDE AND ALBUTEROL SULFATE 3 ML: .5; 3 SOLUTION RESPIRATORY (INHALATION) at 02:07

## 2017-07-19 RX ADMIN — DEXTROSE MONOHYDRATE, SODIUM CHLORIDE, AND POTASSIUM CHLORIDE: 50; 4.5; 1.49 INJECTION, SOLUTION INTRAVENOUS at 06:07

## 2017-07-19 RX ADMIN — METOPROLOL TARTRATE 5 MG: 5 INJECTION INTRAVENOUS at 11:07

## 2017-07-19 RX ADMIN — FAMOTIDINE 20 MG: 10 INJECTION, SOLUTION INTRAVENOUS at 11:07

## 2017-07-19 RX ADMIN — CEFOXITIN SODIUM 2 G: 2 INJECTION, SOLUTION INTRAVENOUS at 01:07

## 2017-07-19 RX ADMIN — AMLODIPINE BESYLATE 5 MG: 10 TABLET ORAL at 03:07

## 2017-07-19 NOTE — PLAN OF CARE
Problem: Patient Care Overview  Goal: Plan of Care Review  Outcome: Ongoing (interventions implemented as appropriate)  Vital signs stable. Afebrile. Drowsy and restless at times but oriented x4.  Denies pain/nausea. Chest tubes intact and draining x2. LISA to bulb suction. Courtney intact and draining. Accuchecks monitored q1h with insulin infusion adjusted per algorithm.  Family updated regarding POC

## 2017-07-19 NOTE — PT/OT/SLP EVAL
"Physical Therapy  Evaluation    Mikie Walter Jr   MRN: [id number]   Admitting Diagnosis: Malignant neoplasm of lower third of esophagus         Treatment time: 11:32-12:02 total 30 min         Billable Minutes:  Evaluation 10 and Therapeutic Activity 20    Diagnosis: Malignant neoplasm of lower third of esophagus  S/p sx 7/18/17 esophagectomy, proximal gastrectomy, L cervical esophagogastrostomy, J tube placement, and abd lymphadonectomy    Past Medical History:   Diagnosis Date    CAD (coronary artery disease)     CKD (chronic kidney disease), stage IV     Diabetes mellitus, type II     Esophageal carcinoma     GERD (gastroesophageal reflux disease)     H/O therapeutic radiation     History of chemotherapy     History of kidney cancer     Hyperlipemia     Hypertension     Status post chemoradiation       Past Surgical History:   Procedure Laterality Date    CORONARY ARTERY BYPASS GRAFT      X 3    CORONARY STENT PLACEMENT      NEPHRECTOMY Left    Referring physician: Evonne Booker  Date referred to PT: 7/18/17  General Precautions: Standard, fall  Orthopedic Precautions: N/A   Braces: N/A       Do you have any cultural, spiritual, Denominational conflicts, given your current situation?: none    Patient History:  Lives With: spouse  Living Arrangements: house  Home Accessibility: stairs to enter home (1 small CAITLIN)  Home Layout: Able to live on 1st floor (Pt lives in 2 story home, but is able to stay on 1st floor)  Number of Stairs to Enter Home: 1  Equipment Currently Used at Home: none    Previous Level of Function:  Ambulation Skills: independent  Transfer Skills: independent  ADL Skills: independent  Work/Leisure Activity: independent    Subjective:  Communicated with nurse prior to session.  Pt c/o severe pain in his back after sitting on the EOB ~ 8 min; "I need to lie back down"    Pain/Comfort  Location - Orientation 1: generalized  Pain Addressed 1:  (pt pushed his PCA pump during treatment)  Pain " Rating Post-Intervention 1: 6/10 (pt states his back pain improved after return to supine)    Objective:   Patient found with: blood pressure cuff, chest tube, LISA drain, oxygen, PCA, SCD, telemetry, peripheral IV (chest tube x 2)     Cognitive Exam:  Oriented to: Person, Place, Time and Situation    Follows Commands/attention: Follows multistep  commands  Communication: clear/fluent  Safety awareness/insight to disability: intact    Physical Exam:  Postural examination/scapula alignment: Rounded shoulder, Head forward and Abnormal trunk flexion    Sensation:   Intact  light/touch B LE    Lower Extremity Range of Motion:  Right Lower Extremity: WFL  Left Lower Extremity: WFL    Lower Extremity Strength:  Right Lower Extremity: WFL  Left Lower Extremity: WFL     Functional Mobility:  Bed Mobility:  Rolling/Turning Right: Minimum assistance  Supine to Sit: Minimum Assistance  Sit to Supine: Minimum Assistance    Transfers:  Sit <> Stand Assistance: Minimum Assistance (2 trials from EOB)  Sit <> Stand Assistive Device:  (HHA)    Gait:   Gait Distance: 2 sidesteps towards HOB with HHA with decreased step length and flexed trunk  Assistance 1: Minimum assistance  Gait Assistive Device: Hand held assist  Gait Deviation(s): decreased mary, decreased step length, decreased toe-to-floor clearance    Balance:   Static Sit: FAIR: Maintains without assist, but unable to take any challenges   Dynamic Sit: FAIR: Cannot move trunk without losing balance  Static Stand: POOR+: Needs MINIMAL assist to maintain    Therapeutic Activities and Exercises:  Pt c/o dizziness upon sitting; /80, nurse notified of dizziness and back pain  AM-PAC 6 CLICK MOBILITY  How much help from another person does this patient currently need?   1 = Unable, Total/Dependent Assistance  2 = A lot, Maximum/Moderate Assistance  3 = A little, Minimum/Contact Guard/Supervision  4 = None, Modified [place]/Independent    Turning over in bed (including  adjusting bedclothes, sheets and blankets)?: 3  Sitting down on and standing up from a chair with arms (e.g., wheelchair, bedside commode, etc.): 3  Moving from lying on back to sitting on the side of the bed?: 3  Moving to and from a bed to a chair (including a wheelchair)?: 3  Need to walk in hospital room?: 3  Climbing 3-5 steps with a railing?: 2  Total Score: 17     AM-PAC Raw Score CMS G-Code Modifier Level of Impairment Assistance   6 % Total / Unable   7 - 9 CM 80 - 100% Maximal Assist   10 - 14 CL 60 - 80% Moderate Assist   15 - 19 CK 40 - 60% Moderate Assist   20 - 22 CJ 20 - 40% Minimal Assist   23 CI 1-20% SBA / CGA   24 CH 0% Independent/ Mod I     Patient left supine with all lines intact, call button in reach, nurse notified and family present.    Assessment:   Mikie Walter Jr is a 66 y.o. male with a medical diagnosis of Malignant neoplasm of lower third of esophagus and presents with difficulty with functional mobility due to pain, weakness, instability, and SOB/fatigue. Pt can benefit from continued therapy to work towards improved mobility.    Rehab identified problem list/impairments: Rehab identified problem list/impairments: weakness, impaired endurance, gait instability, impaired balance, impaired self care skills, impaired functional mobilty, pain    Rehab potential is good.    Activity tolerance: Good    Discharge recommendations: Discharge Facility/Level Of Care Needs: home health PT     Barriers to discharge: Barriers to Discharge: Inaccessible home environment (1 CAITLIN)    Equipment recommendations: Equipment Needed After Discharge: none     GOALS:    Physical Therapy Goals        Problem: Physical Therapy Goal    Goal Priority Disciplines Outcome Goal Variances Interventions   Physical Therapy Goal     PT/OT, PT Ongoing (interventions implemented as appropriate)     Description:  PT goals until 7/25/17    1. Pt supine to sit with supervision-not met  2. Pt sit to supine with  supervision-not met  3. Pt sit to stand with no AD with supervision-not met  4. Pt to perform gait 200ft with no AD with supervision.-not met  5. Pt to go up/down curb step with no AD with CGA.-not met  6. Pt to perform B LE exs in sitting x 15 reps with handout.-not met                  PLAN:    Patient to be seen 5 x/week to address the above listed problems via gait training, therapeutic activities, therapeutic exercises  Plan of Care expires: 08/18/17  Plan of Care reviewed with: patient, spouse    Constance HYMAN Tim, PT  07/19/2017

## 2017-07-19 NOTE — PLAN OF CARE
Patient is a 66 year old male admitted from home and underwent: Total thoracic esophagectomy, proximal gastrectomy, left cervical esophagogastrostomy, mediastinal and abdominal lymphadenectomy via right chest, abdominal and left neck approach; robotic-assisted, Witzel jejunostomy 7/18/2017.  Patient is expected to discharge home with home health and tube feeds +/- 7/24/2017.    Patient lives in a one story home with his wife, Swati, who will drive him home, care for him and obtain anything he needs after discharge.   Education on IS, nutrition, activity and Esophagectomy Patient Care Map done and patient given [facility] with understanding verbalized.  Will continue to follow for needs.    PCP  Isrrael Lipscomb MD  [address] [facility] [facility] [address] / [place] [id number] [phone]      [id number] [facility] [address] [facility] [address]  [address] [facility] [place] [address]  Phone: [phone] Fax: [phone]      Extended Emergency Contact Information  Primary Emergency Contact: Swati Walter  Address: [address] [place] [address]  Home Phone: [phone]  Mobile Phone: [phone]  Relation: Spouse       07/19/17 1512   Discharge Assessment   Assessment Type Discharge Planning Assessment   Confirmed/corrected address and phone number on facesheet? Yes   Assessment information obtained from? Patient   Expected Length of Stay (days) 6   Communicated expected length of stay with patient/caregiver yes   Prior to hospitilization cognitive status: Alert/Oriented   Prior to hospitalization functional status: Independent   Current cognitive status: Alert/Oriented   Current Functional Status: Independent;Assistive Equipment;Needs Assistance   Arrived From home or self-care   Lives With spouse   Able to Return to Prior Arrangements yes   Is patient able to care for self after discharge? Yes   How many people do you have in your home that can help with your care after  discharge? 1   Who are your caregiver(s) and their phone number(s)? wife   Patient's perception of discharge disposition home or selfcare;home health   Equipment Currently Used at Home none   Do you have any problems affording any of your prescribed medications? No   Is the patient taking medications as prescribed? yes   Do you have any financial concerns preventing you from receiving the healthcare you need? No   Does the patient have transportation to healthcare appointments? Yes   Transportation Available family or friend will provide   On Dialysis? No   Does the patient receive services at the Coumadin Clinic? No   Discharge Plan A Home with family;Home Health   Discharge Plan B Skilled Nursing Facility   Patient/Family In Agreement With Plan yes

## 2017-07-19 NOTE — ANESTHESIA RELEASE NOTE
Anesthesia Release from PACU Note    Patient: Mikie Walter Jr    Procedure(s) Performed: Procedure(s) (LRB):  ESOPHAGECTOMY-ROBOTIC (N/A)  PLACEMENT-TUBE-JEJUNOSTOMY (N/A)    Anesthesia type: general    Post pain: Adequate analgesia    Post assessment: no apparent anesthetic complications    Last Vitals:   Visit Vitals  /72   Pulse 69   Temp 36.4 °C (97.5 °F) (Oral)   Resp 13   Ht 6' (1.829 m)   Wt 109.3 kg (241 lb)   SpO2 97%   BMI 32.69 kg/m²       Post vital signs: stable    Level of consciousness: awake    Nausea/Vomiting: no nausea/no vomiting    Complications: none    Airway Patency: patent    Respiratory: nasal cannula    Cardiovascular: stable and blood pressure at baseline    Hydration: euvolemic

## 2017-07-19 NOTE — PLAN OF CARE
[facility]       HOME  HEALTH ORDERS                                    FACE TO FACE ENCOUNTER      Patient Name: Mikie Walter Jr  YOB: 1950    PCP: Jeff Bunn MD   PCP Address: [address] [place] [address] Greg / BREANA LAGUNAS  PCP Phone Number: [phone]  PCP Fax: [phone]    Encounter Date: 7/24/2017  Admit to Home Health    Diagnoses:  Active Hospital Problems    Diagnosis  POA    *Malignant neoplasm of lower third of esophagus [C15.5]  Yes    H/O esophagectomy [Z90.49]  Not Applicable    CKD (chronic kidney disease), stage III [N18.3]  Unknown    Type 2 diabetes mellitus with polyneuropathy [E11.42]  Unknown    Benign essential HTN [I10]  Yes    Coronary artery disease involving native coronary artery [I25.10]  Yes      Resolved Hospital Problems    Diagnosis Date Resolved POA    Secondary DM with CKD stage 4 and hypertension [E13.22, I12.9, N18.4] 07/19/2017 Yes    Type 2 diabetes mellitus without complication [E11.9] 07/19/2017 Yes       I have seen and examined this patient face to face today. My clinical findings that support the need for the home health skilled services and home bound status are the following:  Weakness/numbness causing balance and gait disturbance due to Surgery making it taxing to leave home.    Allergies:  Review of patient's allergies indicates:   Allergen Reactions    Ciprofloxacin Hives and Swelling       Diet: NPO except for medications with a sip of water.     Activities: activity as tolerated    Nursing:   SN to complete comprehensive assessment including routine vital signs. Instruct on disease process and s/s of complications to report to MD. Review/verify medication list sent home with the patient at time of discharge  and instruct patient/caregiver as needed. Frequency may be adjusted depending on start of care date.    Notify MD if SBP > 160 or < 90; DBP > 90 or < 50; HR > 120 or < 50;  Temp > 101       CONSULTS:     to evaluate for community resources/long-range planning.  Aide to provide assistance with personal care, ADLs, and vital signs.    MISCELLANEOUS CARE:  Diabetic Care:   SN to perform and educate Diabetic management with blood glucose monitoring:, Fingerstick blood sugar every 6 hours if patient is unable to eat and Report CBG < 60 or > 350 to physician.    Medications: Review discharge medications with patient and family and provide education.      Current Discharge Medication List      CONTINUE these medications which have NOT CHANGED    Details   amlodipine (NORVASC) 5 MG tablet Take 5 mg by mouth once daily.      metoprolol succinate (TOPROL-XL) 100 MG 24 hr tablet Take 100 mg by mouth once daily.       pantoprazole (PROTONIX) 40 MG tablet Take 40 mg by mouth once daily.      aspirin (ECOTRIN) 81 MG EC tablet Take 81 mg by mouth once daily.      EFFIENT 10 mg Tab       KOMBIGLYZE XR 2.5-1,000 mg TM24       liraglutide 0.6 mg/0.1 mL, 18 mg/3 mL, subq PNIJ (VICTOZA 2-RUTH) 0.6 mg/0.1 mL (18 mg/3 mL) PnIj Inject 0.6 mg into the skin once daily.      lisinopril-hydrochlorothiazide (PRINZIDE,ZESTORETIC) 20-12.5 mg per tablet Take 1 tablet by mouth once daily.      simvastatin (ZOCOR) 20 MG tablet Take 20 mg by mouth every evening.             Disciplines requested: Nursing Services to provide:   Other: S/P esophagectomy   Flush jejunostomy tube with 20cc water Q 8 hours     Free water flushes of 200cc via jejunostomy tube TID while awake and free water infusion @ 30cc/hour while tube feeds are infusing.     Monitor abdomen for redness, oozing from staple site, abdominal distension, or pain not controlled by pain medication.     TUBE FEEDS via jejunostomy tube: Isosource 1.5 (or equivalent) @ 40cc/hour from 2p-8a.  Increase by 10cc every day at 2pm to a goal of 80cc/hour.    Vitals signs daily. Call MD with Temperature > 101, SBP > 180, HR < 50.   Physician to follow patient's  care (the person listed here will be responsible for signing ongoing orders): Other: Dr. KIRSTEN Padilla, Dr. JONATHAN Pierre, Dr. PHUONG Garcia [phone] office [phone] fax Requested Start of Care Date: 7/24/2017    I certify that this patient is confined to his home and needs intermittent skilled nursing care.      Electronically Signed  _________________________________  Nany Mercado NP  7/24/2017

## 2017-07-19 NOTE — OP NOTE
DATE OF PROCEDURE:  07/18/2017    OPERATING SURGEON:  George Padilla M.D.    ASSISTANT:  William Rodriguez M.D. 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    OPERATIVE PROCEDURES:  Total thoracic esophagectomy, proximal gastrectomy, left   cervical esophagogastrostomy, mediastinal and abdominal lymphadenectomy, via   right chest, abdominal and left neck approach, robotic-assisted; Witzel   jejunostomy.    PROCEDURE IN DETAIL:  The patient is placed in the supine position on the   operating table.  Adequate general endotracheal anesthesia is induced.  A right   endobronchial blocker is placed for right lung isolation.  The patient is placed   in the left lateral decubitus position and appropriately padded by the Surgical   and Anesthesiology teams.  The right hemithorax is prepped and draped in   sterile fashion.  A 12 mm port is placed in the fifth intercostal space at   approximately the mid axillary line.  The right lung is isolated; 10 cm of   insufflation pressure is applied and we do have an adequate working space.    Under direct vision, two 8 mm robotic ports and a 12 mm assistant port are   added.  The inferior pulmonary ligament is taken down.  The right lung is gently   retracted anteriorly.  The mediastinal pleura overlying the esophagus is widely   taken.  Azygos vein is isolated and taken using the Endo-JOSTIN vascular stapler.    The entire thoracic esophagus is mobilized along with systematic and extensive   node dissections at levels 4, 7 and 8.  Penrose drains are placed around the   esophagus just below the thoracic inlet and just above the hiatus.  Both of   these are secured with Endoloops.  A 24 Jorge drain is brought through the   assistant port as a  right chest tube.  This is tacked in place with a 2-0   chromic.  The robotic instruments are withdrawn.  The chest tube is secured.    The right lung is reexpanded.  The camera is also withdrawn and the robot is   undocked and moved away from the operative field.  Chest tube sites are closed   in the usual fashion and infiltrated with Exparel solution.  The patient is then   placed in the supine position on the operating table.  The right endobronchial   blocker has been removed.  A small pad is placed beneath the left flank and a   shoulder roll is placed.  The abdomen, anterior chest and left neck are prepped   and draped in sterile fashion.  The Veress needle is introduced in the   supraumbilical position and intraperitoneal positioning is confirmed.    Insufflation is carried out without difficulty.  Using the Alibabaview, under   direct vision, a camera port is placed to the left of and above the umbilicus.    We do have a free space.  Laparoscopy is carried out.  There is no evidence of   liver or peritoneal metastases.  There are a moderate number of adhesions in the   left upper quadrant from the patient's prior left nephrectomy.  A right upper   quadrant robotic port and a 5 mm liver port are added, and through these   incisions, the adhesions are carefully taken down until we have a free space in   the left upper quadrant, and at this point, two additional robotic working ports   are added and finally a 12 mm assistant port is added.  The patient is placed   in reverse Trendelenburg.  The robotic cart is brought over the patient and   docked and once again I moved to the console.  The stomach is elevated.  The   gastrocolic omentum is opened using the vessel sealer.  The greater curvature of   the stomach is mobilized staying outside of and carefully preserving the right   gastroepiploic arcade.  Dissection is continued across the short gastrics to the   left brandy.  Posterior adhesions in the lesser sac are  taken down.  The   gastrohepatic omentum is then widely taken.  All soft tissue and ivette tissue   around the right and left crura are swept down onto the specimen.  The   gastrohepatic omentum is opened down to the lesser curvature aspect of the   stomach.  The left gastric pedicle is then elevated.  Nodes along the common   hepatic and proximal splenic are mobilized up onto the pedicle and the pedicle   is then taken using the Endo-JOSTIN vascular stapler.  The lower aspect of our   right gastroepiploic pedicle is then mobilized carefully preserving the pedicle   itself.  Kocher maneuver is performed.  Pyloromyotomy is also performed.    Additional dissection within the right and left brandy communicates with the prior   thoracic dissection and the Penrose drain, which had been placed around the   lower esophagus, is pulled into the abdomen and used to place traction and   facilitate taking down the final attachments of the specimen in the lower   mediastinum.  The stomach is then divided using a robotic stapler starting with   two green loads at the antrum and then continuing with blue loads obliquely up   the body of the stomach.  This includes the GE junction, cardia and most of the   lesser curve en bloc with the esophagus and attached the lymph nodes while   creating a gastric conduit based on the greater curvature of the stomach and the   right gastroepiploic vessels.  The staple line is carefully inspected and is   intact without bleeding.  The posterior crura are repaired with a 2-0 silk   horizontal mattress suture.  The specimen has been stuffed into the mediastinum   and right chest and the lower aspect of the specimen is hitched to the upper   aspect of the gastric conduit with a 2-0 silk stitch.  The robotic instruments   are then withdrawn and the robot is undocked and moved away from the operative   field.  The pneumoperitoneum is decompressed.  A left cervical counterincision   is then made.  The medial  border of the sternocleidomastoid is mobilized.  The   space just medial to the left carotid is entered and deepened to the   prevertebral fascia and the esophagus is elevated posteriorly and we easily   communicate with our prior dissection, which has been done through the chest and   are able to grasp the Penrose drain around the esophagus just below the   thoracic inlet and used this to elevate the esophagus up into the wound and   complete our mobilization of the lower cervical esophagus.  Then, applying   traction on the esophagus, the specimen is delivered through the neck at the   same time delivering the conduit up to the neck.  We do have ample length.  The   conduit is detached from the specimen.  The cervical esophagus is divided using   the Covidien purple staple load.  The specimen is submitted to Pathology.    Proximal and distal margins are negative.  In the neck, a side-to-side JOSTIN   stapled esophagogastrostomy is carried out using the Covidien purple load.    Transverse enterotomies are closed with interrupted 3-0 Vicryl.  The redundant   proximal stomach above the anastomosis is resected with a Covidien purple staple   load.  The stomach is then reduced by applying gentle traction to the antrum   and reducing the anastomosis to within the thoracic inlet.  The antrum is then   hitched to the crura with 2-0 EndoStitch.  Returning to the abdomen, a 12-Citizen of [place]   red rubber catheter is placed as a Witzel jejunostomy using laparoscopic   technique.  The operative field is inspected.  Hemostasis is excellent.  A 0   Vicryl is used to close the fascia at our three 12 mm port sites.  Subcutaneous   tissue and skin at each port site is closed in layers in the usual fashion.  The   neck is closed over a 19-Citizen of [place] Jorge drainage.  Sterile dressings are applied.    I failed to mention earlier, but during the abdominal part of the procedure,   the patient briefly became hypotensive and it was apparent had  developed a left   pneumothorax and therefore a left chest tube is also placed and connected to   Pleur-Evac suction.  The patient has tolerated the procedure quite well.  He is   brought to the Recovery Room in stable condition.      KEITH  dd: 07/18/2017 17:50:20 (CDT)  td: 07/18/2017 20:37:16 (CDT)  Doc ID   #[id number]  Job ID #[id number]    CC:

## 2017-07-19 NOTE — ANESTHESIA POSTPROCEDURE EVALUATION
Anesthesia Post Evaluation    Patient: Mikie Walter Jr    Procedure(s) Performed: Procedure(s) (LRB):  ESOPHAGECTOMY-ROBOTIC (N/A)  PLACEMENT-TUBE-JEJUNOSTOMY (N/A)    Final Anesthesia Type: general  Patient location during evaluation: PACU  Patient participation: Yes- Able to Participate  Level of consciousness: awake and alert  Post-procedure vital signs: reviewed and stable  Pain management: adequate  Airway patency: patent  PONV status at discharge: No PONV  Anesthetic complications: no      Cardiovascular status: blood pressure returned to baseline  Respiratory status: nasal cannula  Hydration status: euvolemic          Visit Vitals  /72   Pulse 69   Temp 36.4 °C (97.5 °F) (Oral)   Resp 13   Ht 6' (1.829 m)   Wt 109.3 kg (241 lb)   SpO2 97%   BMI 32.69 kg/m²       Pain/Lisa Score: Pain Assessment Performed: Yes (7/18/2017  8:00 PM)  Presence of Pain: non-verbal indicators absent (7/18/2017  8:00 PM)  Pain Rating Prior to Med Admin: 8 (7/18/2017  6:36 PM)  Pain Rating Post Med Admin: 8 (7/18/2017  7:00 PM)  Lisa Score: 8 (7/18/2017  8:00 PM)

## 2017-07-19 NOTE — SUBJECTIVE & OBJECTIVE
Interval History: POD s/p robotic esophagectomy. Pt doing well, NAEON, adequate pain control. On pathway    Medications:  Continuous Infusions:   dextrose 5 % and 0.45 % NaCl with KCl 20 mEq 80 mL/hr at 07/19/17 0641    hydromorphone in 0.9 % NaCl 6 mg/30 ml      insulin (HUMAN R) infusion (adults) 0.4 Units/hr (07/19/17 0850)     Scheduled Meds:   albuterol-ipratropium 2.5mg-0.5mg/3mL  3 mL Nebulization Q6H WAKE    aspirin  81 mg Per J Tube Daily    ceFOXItin (MEFOXIN) IVPB  2 g Intravenous Q8H    enoxparin  40 mg Subcutaneous Q24H    metoprolol  5 mg Intravenous Q6H     PRN Meds:dextrose 50%, dextrose 50%, diphenhydrAMINE, naloxone     Review of patient's allergies indicates:   Allergen Reactions    Ciprofloxacin Hives and Swelling     Objective:     Vital Signs (Most Recent):  Temp: 98.2 °F (36.8 °C) (07/19/17 0730)  Pulse: 68 (07/19/17 0800)  Resp: (!) 3 (07/19/17 0800)  BP: 137/75 (07/19/17 0730)  SpO2: 100 % (07/19/17 0800) Vital Signs (24h Range):  Temp:  [97.4 °F (36.3 °C)-98.2 °F (36.8 °C)] 98.2 °F (36.8 °C)  Pulse:  [64-82] 68  Resp:  [3-27] 3  SpO2:  [91 %-100 %] 100 %  BP: (114-143)/(60-77) 137/75  Arterial Line BP: (120-145)/(57-76) 131/65     Weight: 109.3 kg (241 lb)  Body mass index is 32.69 kg/m².    Intake/Output - Last 3 Shifts       07/17 0700 - 07/18 0659 07/18 0700 - 07/19 0659 07/19 0700 - 07/20 0659    I.V. (mL/kg)  7169 (65.6)     Total Intake(mL/kg)  7169 (65.6)     Urine (mL/kg/hr)  1780 (0.7)     Drains  45 (0)     Blood  150 (0.1)     Chest Tube  849 (0.3)     Total Output   2824      Net   +4345                   Physical Exam  A&O, NAD  RRR  Lungs CTAB  Chest tubes in place, on water seal  Drains with SS output  ABD: S/ND/ Nancy. TTP           Significant Labs:  CBC:   Recent Labs  Lab 07/19/17  0415   WBC 5.64   RBC 3.81*   HGB 12.3*   HCT 35.2*   *   MCV 92   MCH 32.3*   MCHC 34.9     CMP:   Recent Labs  Lab 07/19/17  0415   *   CALCIUM 7.9*   ALBUMIN 2.7*   PROT  5.4*   *   K 4.6   CO2 20*      BUN 18   CREATININE 1.7*   ALKPHOS 47*   *   *   BILITOT 0.6       Significant Diagnostics:  CXR: I have reviewed all pertinent results/findings within the past 24 hours and my personal findings are:     No significant detrimental interval change in the appearance of the chest since 7/18/17 at 7:40 PM is appreciated.  No pneumothorax.    I have reviewed all pertinent imaging results/findings within the past 24 hours.

## 2017-07-19 NOTE — ASSESSMENT & PLAN NOTE
POD 1 s/p Robotic Esophagectomy. On Pathway. Doing well overall.     - Water seal chest tubes  - D/c kyrie gates-line  - IVF @ 80cc/hr  - NPO

## 2017-07-19 NOTE — ASSESSMENT & PLAN NOTE
bg goal 140-180- Start insulin transition gtt at 0.4 u/h w q4 bg monitoring and low dose correction    Discharge planning- on-going

## 2017-07-19 NOTE — PROGRESS NOTES
[facility]  General Surgery  Progress Note    Subjective:     History of Present Illness:  No notes on file    Post-Op Info:  Procedure(s) (LRB):  ESOPHAGECTOMY-ROBOTIC (N/A)  PLACEMENT-TUBE-JEJUNOSTOMY (N/A)   1 Day Post-Op     Interval History: POD s/p robotic esophagectomy. Pt doing well, NAEON, adequate pain control. On pathway    Medications:  Continuous Infusions:   dextrose 5 % and 0.45 % NaCl with KCl 20 mEq 80 mL/hr at 07/19/17 0641    hydromorphone in 0.9 % NaCl 6 mg/30 ml      insulin (HUMAN R) infusion (adults) 0.4 Units/hr (07/19/17 0850)     Scheduled Meds:   albuterol-ipratropium 2.5mg-0.5mg/3mL  3 mL Nebulization Q6H WAKE    aspirin  81 mg Per J Tube Daily    ceFOXItin (MEFOXIN) IVPB  2 g Intravenous Q8H    enoxparin  40 mg Subcutaneous Q24H    metoprolol  5 mg Intravenous Q6H     PRN Meds:dextrose 50%, dextrose 50%, diphenhydrAMINE, naloxone     Review of patient's allergies indicates:   Allergen Reactions    Ciprofloxacin Hives and Swelling     Objective:     Vital Signs (Most Recent):  Temp: 98.2 °F (36.8 °C) (07/19/17 0730)  Pulse: 68 (07/19/17 0800)  Resp: (!) 3 (07/19/17 0800)  BP: 137/75 (07/19/17 0730)  SpO2: 100 % (07/19/17 0800) Vital Signs (24h Range):  Temp:  [97.4 °F (36.3 °C)-98.2 °F (36.8 °C)] 98.2 °F (36.8 °C)  Pulse:  [64-82] 68  Resp:  [3-27] 3  SpO2:  [91 %-100 %] 100 %  BP: (114-143)/(60-77) 137/75  Arterial Line BP: (120-145)/(57-76) 131/65     Weight: 109.3 kg (241 lb)  Body mass index is 32.69 kg/m².    Intake/Output - Last 3 Shifts       07/17 0700 - 07/18 0659 07/18 0700 - 07/19 0659 07/19 0700 - 07/20 0659    I.V. (mL/kg)  7169 (65.6)     Total Intake(mL/kg)  7169 (65.6)     Urine (mL/kg/hr)  1780 (0.7)     Drains  45 (0)     Blood  150 (0.1)     Chest Tube  849 (0.3)     Total Output   2824      Net   +4345                   Physical Exam  A&O, NAD  RRR  Lungs CTAB  Chest tubes in place, on water seal  Drains with SS output  ABD: S/ND/ Nancy.  TTP           Significant Labs:  CBC:   Recent Labs  Lab 07/19/17  0415   WBC 5.64   RBC 3.81*   HGB 12.3*   HCT 35.2*   *   MCV 92   MCH 32.3*   MCHC 34.9     CMP:   Recent Labs  Lab 07/19/17  0415   *   CALCIUM 7.9*   ALBUMIN 2.7*   PROT 5.4*   *   K 4.6   CO2 20*      BUN 18   CREATININE 1.7*   ALKPHOS 47*   *   *   BILITOT 0.6       Significant Diagnostics:  CXR: I have reviewed all pertinent results/findings within the past 24 hours and my personal findings are:     No significant detrimental interval change in the appearance of the chest since 7/18/17 at 7:40 PM is appreciated.  No pneumothorax.    I have reviewed all pertinent imaging results/findings within the past 24 hours.    Assessment/Plan:     Type 2 diabetes mellitus without complication    Monitoring    - Insulin ggt        Benign essential HTN    Monitoring, vital signs WNL    - cont IV metoprolol         * Malignant neoplasm of lower third of esophagus    POD 1 s/p Robotic Esophagectomy. On Pathway. Doing well overall.     - Water seal chest tubes  - D/c kyrie gates-line  - IVF @ [facility]/hr  - NPO              Nayan Carlson MD   [phone]  General Surgery [facility]

## 2017-07-19 NOTE — HPI
Reason for Consult: Management of T2DM, Hyperglycemia     Surgical Procedure and Date: 7/18/17    Diabetes diagnosis year: . 20 years    Home Diabetes Medications:  Kombiglyze and Victoza stopped 1 week prior to admission    How often checking glucose at home? One   BG readings on regimen: 170-190s  Hypoglycemia on the regimen?  No  Missed doses on regimen?  No    Diabetes Complications include:   Hyperglycemia and peripheral neuropathy    Complicating diabetes co morbidities: Active Cancer    HPI:  Mr. Walter is a 66 y.o. male with a history of T2DM, HTN, CAD s/p CABG x 3 in 2011 with subsequent stent placement for bypass failure, kidney cancer s/p left nephrectomy in 11/2016, chronic GERD, and esophageal adenocarcinoma, diagnosed in 10/2016. He is s/p neoadjuvant chemoradiation, last dose of radiation on 4/25/17. He first presented back in 10/2016 with c/o 2 months of progressive dysphagia. EGD/EUS at that time revealed a tumor at the esophagogastric junction, staged T2 by ultrasound. He is now s/p esophagectomy. Endocrine consulted for bg management.

## 2017-07-19 NOTE — NURSING TRANSFER
Nursing Transfer Note      7/18/2017     Transfer to 611    Transfer via bed    Transfer with Portable O2/pca/iv pole    Transported by SHE Wayne RN & PCT    Medicines sent: Dilaudid pca/ivf/cefoxitin    Chart send with patient: yes    Notified:wife    Patient reassessed at: 7/18/17 @2100

## 2017-07-19 NOTE — SUBJECTIVE & OBJECTIVE
Interval HPI:    insulin gtt infusing, no hypoglycemia, bg at or near goal, NPO    /75 (BP Location: Left arm, Patient Position: Lying, BP Method: Automatic)   Pulse 68   Temp 98.2 °F (36.8 °C) (Oral)   Resp (!) 3   Ht 6' (1.829 m)   Wt 109.3 kg (241 lb)   SpO2 100%   BMI 32.69 kg/m²     Labs Reviewed and Include      Recent Labs  Lab 07/19/17  0415   *   CALCIUM 7.9*   ALBUMIN 2.7*   PROT 5.4*   *   K 4.6   CO2 20*      BUN 18   CREATININE 1.7*   ALKPHOS 47*   *   *   BILITOT 0.6     Lab Results   Component Value Date    WBC 5.64 07/19/2017    HGB 12.3 (L) 07/19/2017    HCT 35.2 (L) 07/19/2017    MCV 92 07/19/2017     (L) 07/19/2017     No results for input(s): TSH, FREET4 in the last 168 hours.  Lab Results   Component Value Date    HGBA1C 6.3 (H) 07/18/2017       Nutritional status:   Body mass index is 32.69 kg/m².  Lab Results   Component Value Date    ALBUMIN 2.7 (L) 07/19/2017    ALBUMIN 3.0 (L) 07/18/2017    ALBUMIN 4.0 07/10/2017     Lab Results   Component Value Date    PREALBUMIN 34 07/10/2017       Estimated Creatinine Clearance: 53.9 mL/min (based on Cr of 1.7).    Accu-Checks  Recent Labs      07/18/17   2201  07/18/17   2303  07/19/17   0002  07/19/17   0055  07/19/17   0203  07/19/17   0311  07/19/17   0400  07/19/17   0504  07/19/17   0559  07/19/17   0802   POCTGLUCOSE  112*  147*  147*  171*  172*  184*  182*  186*  190*  192*       Current Medications and/or Treatments Impacting Glycemic Control  Immunotherapy:  Immunosuppressants     None        Steroids:   Hormones     None        Pressors:    Autonomic Drugs     None        Hyperglycemia/Diabetes Medications: Antihyperglycemics     Start     Stop Route Frequency Ordered    07/18/17 1915  insulin regular (Humulin R) 100 Units in sodium chloride 0.9% 100 mL infusion      -- IV Continuous 07/18/17 8239

## 2017-07-19 NOTE — PLAN OF CARE
Problem: Physical Therapy Goal  Goal: Physical Therapy Goal  PT goals until 7/25/17    1. Pt supine to sit with supervision-not met  2. Pt sit to supine with supervision-not met  3. Pt sit to stand with no AD with supervision-not met  4. Pt to perform gait 200ft with no AD with supervision.-not met  5. Pt to go up/down curb step with no AD with CGA.-not met  6. Pt to perform B LE exs in sitting x 15 reps with handout.-not met    Outcome: Ongoing (interventions implemented as appropriate)  Pt's goals set and pt will benefit from skilled PT services to work towards improved functional mobility including: bed mobility, transfers, up/down step,and gait.   Constance Dumont, PT  7/19/2017

## 2017-07-19 NOTE — ASSESSMENT & PLAN NOTE
H/o nephrectomy  R/t cancer  Estimated Creatinine Clearance: 53.9 mL/min (based on Cr of 1.7).  Avoid hypoglycemia

## 2017-07-20 PROBLEM — I25.10 CORONARY ARTERY DISEASE INVOLVING NATIVE CORONARY ARTERY OF NATIVE HEART WITHOUT ANGINA PECTORIS: Status: ACTIVE | Noted: 2017-07-20

## 2017-07-20 LAB
ALBUMIN SERPL BCP-MCNC: 2.5 G/DL
ALP SERPL-CCNC: 48 U/L
ALT SERPL W/O P-5'-P-CCNC: 169 U/L
ANION GAP SERPL CALC-SCNC: 7 MMOL/L
AST SERPL-CCNC: 142 U/L
BASOPHILS # BLD AUTO: 0.01 K/UL
BASOPHILS NFR BLD: 0.2 %
BILIRUB SERPL-MCNC: 0.5 MG/DL
BUN SERPL-MCNC: 15 MG/DL
CALCIUM SERPL-MCNC: 8.7 MG/DL
CHLORIDE SERPL-SCNC: 103 MMOL/L
CO2 SERPL-SCNC: 26 MMOL/L
CREAT SERPL-MCNC: 1.6 MG/DL
DIFFERENTIAL METHOD: ABNORMAL
EOSINOPHIL # BLD AUTO: 0 K/UL
EOSINOPHIL NFR BLD: 0.6 %
ERYTHROCYTE [DISTWIDTH] IN BLOOD BY AUTOMATED COUNT: 12.6 %
EST. GFR  (AFRICAN AMERICAN): 50.8 ML/MIN/1.73 M^2
EST. GFR  (NON AFRICAN AMERICAN): 43.9 ML/MIN/1.73 M^2
GLUCOSE SERPL-MCNC: 202 MG/DL
HCT VFR BLD AUTO: 36.4 %
HGB BLD-MCNC: 12 G/DL
LYMPHOCYTES # BLD AUTO: 0.5 K/UL
LYMPHOCYTES NFR BLD: 9.4 %
MAGNESIUM SERPL-MCNC: 1.8 MG/DL
MCH RBC QN AUTO: 31.4 PG
MCHC RBC AUTO-ENTMCNC: 33 G/DL
MCV RBC AUTO: 95 FL
MONOCYTES # BLD AUTO: 0.6 K/UL
MONOCYTES NFR BLD: 10.6 %
NEUTROPHILS # BLD AUTO: 4.1 K/UL
NEUTROPHILS NFR BLD: 79 %
PHOSPHATE SERPL-MCNC: 2.4 MG/DL
PLATELET # BLD AUTO: 112 K/UL
PMV BLD AUTO: 9.8 FL
POCT GLUCOSE: 187 MG/DL (ref 70–110)
POCT GLUCOSE: 187 MG/DL (ref 70–110)
POCT GLUCOSE: 211 MG/DL (ref 70–110)
POCT GLUCOSE: 218 MG/DL (ref 70–110)
POCT GLUCOSE: 226 MG/DL (ref 70–110)
POTASSIUM SERPL-SCNC: 4.6 MMOL/L
PROT SERPL-MCNC: 5.7 G/DL
RBC # BLD AUTO: 3.82 M/UL
SODIUM SERPL-SCNC: 136 MMOL/L
WBC # BLD AUTO: 5.21 K/UL

## 2017-07-20 PROCEDURE — 20600001 HC STEP DOWN PRIVATE ROOM

## 2017-07-20 PROCEDURE — 97116 GAIT TRAINING THERAPY: CPT

## 2017-07-20 PROCEDURE — 25000003 PHARM REV CODE 250: Performed by: STUDENT IN AN ORGANIZED HEALTH CARE EDUCATION/TRAINING PROGRAM

## 2017-07-20 PROCEDURE — 97802 MEDICAL NUTRITION INDIV IN: CPT

## 2017-07-20 PROCEDURE — 36415 COLL VENOUS BLD VENIPUNCTURE: CPT

## 2017-07-20 PROCEDURE — 63600175 PHARM REV CODE 636 W HCPCS: Performed by: NURSE PRACTITIONER

## 2017-07-20 PROCEDURE — 25000003 PHARM REV CODE 250: Performed by: SURGERY

## 2017-07-20 PROCEDURE — 25000242 PHARM REV CODE 250 ALT 637 W/ HCPCS: Performed by: NURSE PRACTITIONER

## 2017-07-20 PROCEDURE — 63600175 PHARM REV CODE 636 W HCPCS: Performed by: SURGERY

## 2017-07-20 PROCEDURE — 27000221 HC OXYGEN, UP TO 24 HOURS

## 2017-07-20 PROCEDURE — 99232 SBSQ HOSP IP/OBS MODERATE 35: CPT | Mod: ,,, | Performed by: NURSE PRACTITIONER

## 2017-07-20 PROCEDURE — 80053 COMPREHEN METABOLIC PANEL: CPT

## 2017-07-20 PROCEDURE — 83735 ASSAY OF MAGNESIUM: CPT

## 2017-07-20 PROCEDURE — 97110 THERAPEUTIC EXERCISES: CPT

## 2017-07-20 PROCEDURE — 25000003 PHARM REV CODE 250: Performed by: NURSE PRACTITIONER

## 2017-07-20 PROCEDURE — 97165 OT EVAL LOW COMPLEX 30 MIN: CPT

## 2017-07-20 PROCEDURE — 94640 AIRWAY INHALATION TREATMENT: CPT

## 2017-07-20 PROCEDURE — 85025 COMPLETE CBC W/AUTO DIFF WBC: CPT

## 2017-07-20 PROCEDURE — 84100 ASSAY OF PHOSPHORUS: CPT

## 2017-07-20 RX ORDER — MAGNESIUM SULFATE HEPTAHYDRATE 40 MG/ML
2 INJECTION, SOLUTION INTRAVENOUS ONCE
Status: COMPLETED | OUTPATIENT
Start: 2017-07-20 | End: 2017-07-20

## 2017-07-20 RX ORDER — GLUCAGON 1 MG
1 KIT INJECTION
Status: DISCONTINUED | OUTPATIENT
Start: 2017-07-20 | End: 2017-07-24 | Stop reason: HOSPADM

## 2017-07-20 RX ORDER — INSULIN ASPART 100 [IU]/ML
0-5 INJECTION, SOLUTION INTRAVENOUS; SUBCUTANEOUS EVERY 6 HOURS PRN
Status: DISCONTINUED | OUTPATIENT
Start: 2017-07-20 | End: 2017-07-21

## 2017-07-20 RX ADMIN — INSULIN DETEMIR 6 UNITS: 100 INJECTION, SOLUTION SUBCUTANEOUS at 09:07

## 2017-07-20 RX ADMIN — METOPROLOL TARTRATE 5 MG: 5 INJECTION INTRAVENOUS at 11:07

## 2017-07-20 RX ADMIN — METOPROLOL TARTRATE 5 MG: 5 INJECTION INTRAVENOUS at 12:07

## 2017-07-20 RX ADMIN — DEXTROSE MONOHYDRATE, SODIUM CHLORIDE, AND POTASSIUM CHLORIDE: 50; 4.5; 1.49 INJECTION, SOLUTION INTRAVENOUS at 12:07

## 2017-07-20 RX ADMIN — IPRATROPIUM BROMIDE AND ALBUTEROL SULFATE 3 ML: .5; 3 SOLUTION RESPIRATORY (INHALATION) at 01:07

## 2017-07-20 RX ADMIN — ENOXAPARIN SODIUM 40 MG: 100 INJECTION SUBCUTANEOUS at 11:07

## 2017-07-20 RX ADMIN — IPRATROPIUM BROMIDE AND ALBUTEROL SULFATE 3 ML: .5; 3 SOLUTION RESPIRATORY (INHALATION) at 07:07

## 2017-07-20 RX ADMIN — ASPIRIN 150 MG: 300 SUPPOSITORY RECTAL at 08:07

## 2017-07-20 RX ADMIN — METOPROLOL TARTRATE 5 MG: 5 INJECTION INTRAVENOUS at 06:07

## 2017-07-20 RX ADMIN — MAGNESIUM SULFATE IN WATER 2 G: 40 INJECTION, SOLUTION INTRAVENOUS at 12:07

## 2017-07-20 RX ADMIN — INSULIN ASPART 2 UNITS: 100 INJECTION, SOLUTION INTRAVENOUS; SUBCUTANEOUS at 05:07

## 2017-07-20 RX ADMIN — INSULIN DETEMIR 6 UNITS: 100 INJECTION, SOLUTION SUBCUTANEOUS at 08:07

## 2017-07-20 RX ADMIN — AMLODIPINE BESYLATE 5 MG: 10 TABLET ORAL at 08:07

## 2017-07-20 RX ADMIN — INSULIN ASPART 2 UNITS: 100 INJECTION, SOLUTION INTRAVENOUS; SUBCUTANEOUS at 12:07

## 2017-07-20 RX ADMIN — INSULIN ASPART 1 UNITS: 100 INJECTION, SOLUTION INTRAVENOUS; SUBCUTANEOUS at 04:07

## 2017-07-20 RX ADMIN — SODIUM PHOSPHATE, MONOBASIC, MONOHYDRATE 30 MMOL: 276; 142 INJECTION, SOLUTION INTRAVENOUS at 12:07

## 2017-07-20 RX ADMIN — METOPROLOL TARTRATE 5 MG: 5 INJECTION INTRAVENOUS at 05:07

## 2017-07-20 RX ADMIN — TAMSULOSIN HYDROCHLORIDE 0.4 MG: 0.4 CAPSULE ORAL at 08:07

## 2017-07-20 RX ADMIN — FAMOTIDINE 20 MG: 10 INJECTION, SOLUTION INTRAVENOUS at 08:07

## 2017-07-20 RX ADMIN — INSULIN ASPART 1 UNITS: 100 INJECTION, SOLUTION INTRAVENOUS; SUBCUTANEOUS at 12:07

## 2017-07-20 NOTE — PLAN OF CARE
Pt is post-op day two of an esophagectomy. He is anticipated to discharge home on Monday with Home Health and home tube feeds. SW met with Pt to discuss discharge planning. Pt signed Patient Choice form indicating that he would like to be referred to [facility]. SW sent all necessary referral information via [facility] and added [facility] to Pt's chart in [facility].     Noelle Quick LCSW

## 2017-07-20 NOTE — ASSESSMENT & PLAN NOTE
H/o nephrectomy r/t cancer  Estimated Creatinine Clearance: 57.2 mL/min (based on Cr of 1.6).  Avoid hypoglycemia

## 2017-07-20 NOTE — PHYSICIAN QUERY
PT Name: Mikie Walter Jr  MR #: [id number]    Physician Query Form - Relationship to Procedure Clarification     CDS/: Lois Patterson BSGISSELLE, RN, CCDS              Contact information: [email or web address]    This form is a permanent document in the medical record.     Query Date: July 20, 2017      By submitting this query, we are merely seeking further clarification of documentation. Please utilize your independent clinical judgment when addressing the question(s) below.    The Medical record contains the following:  Supporting Clinical Findings Location in Medical Record        I failed to mention earlier, but during the abdominal part of the procedure, the patient briefly became hypotensive and it was apparent had developed a left pneumothorax and therefore a left chest tube is also placed and connected to Pleur-Evac suction.      Op note 7/18/17       Please clarify if __Left Pneumothorax____ is    [xxx  ] Inherent/Integral to procedure  [  ] Routine outcome  [  ] Complication of procedure  [  ] Clinically insignificant  [  ] Clinically undetermined

## 2017-07-20 NOTE — SUBJECTIVE & OBJECTIVE
Interval HPI:    NPO, no hypoglycemia, bg at or near goal on insulin gtt    /67 (BP Location: Left arm, Patient Position: Lying, BP Method: Automatic)   Pulse 85   Temp 98.3 °F (36.8 °C) (Oral)   Resp 16   Ht 6' (1.829 m)   Wt 109.3 kg (241 lb)   SpO2 95%   BMI 32.69 kg/m²     Labs Reviewed and Include      Recent Labs  Lab 07/20/17  0408   *   CALCIUM 8.7   ALBUMIN 2.5*   PROT 5.7*      K 4.6   CO2 26      BUN 15   CREATININE 1.6*   ALKPHOS 48*   *   *   BILITOT 0.5     Lab Results   Component Value Date    WBC 5.21 07/20/2017    HGB 12.0 (L) 07/20/2017    HCT 36.4 (L) 07/20/2017    MCV 95 07/20/2017     (L) 07/20/2017     No results for input(s): TSH, FREET4 in the last 168 hours.  Lab Results   Component Value Date    HGBA1C 6.3 (H) 07/18/2017       Nutritional status:   Body mass index is 32.69 kg/m².  Lab Results   Component Value Date    ALBUMIN 2.5 (L) 07/20/2017    ALBUMIN 2.7 (L) 07/19/2017    ALBUMIN 3.0 (L) 07/18/2017     Lab Results   Component Value Date    PREALBUMIN 34 07/10/2017       Estimated Creatinine Clearance: 57.2 mL/min (based on Cr of 1.6).    Accu-Checks  Recent Labs      07/19/17   0311  07/19/17   0400  07/19/17   0504  07/19/17   0559  07/19/17   0802  07/19/17   1200  07/19/17   1603  07/19/17   1948  07/20/17   0017  07/20/17   0431   POCTGLUCOSE  184*  182*  186*  190*  192*  172*  204*  185*  187*  187*       Current Medications and/or Treatments Impacting Glycemic Control  Immunotherapy:  Immunosuppressants     None        Steroids:   Hormones     None        Pressors:    Autonomic Drugs     None        Hyperglycemia/Diabetes Medications: Antihyperglycemics     Start     Stop Route Frequency Ordered    07/20/17 0900  insulin detemir pen 6 Units      -- SubQ 2 times daily 07/20/17 0736    07/20/17 0836  insulin aspart pen 0-5 Units      -- SubQ Every 6 hours PRN 07/20/17 0736

## 2017-07-20 NOTE — PLAN OF CARE
Problem: Physical Therapy Goal  Goal: Physical Therapy Goal  PT goals until 7/25/17    1. Pt supine to sit with supervision-not met  2. Pt sit to supine with supervision-not met  3. Pt sit to stand with no AD with supervision-not met  4. Pt to perform gait 200ft with no AD with supervision.-not met  5. Pt to go up/down curb step with no AD with CGA.-not met  6. Pt to perform B LE exs in sitting x 15 reps with handout.-not met     Pt progressing towards goals. continue with PT POC.Goals remain appropriate.    George Yancey PTA  7/20/2017

## 2017-07-20 NOTE — PLAN OF CARE
Problem: Diabetes, Type 2 (Adult)  Goal: Signs and Symptoms of Listed Potential Problems Will be Absent, Minimized or Managed (Diabetes, Type 2)  Signs and symptoms of listed potential problems will be absent, minimized or managed by discharge/transition of care (reference Diabetes, Type 2 (Adult) CPG).   Outcome: Ongoing (interventions implemented as appropriate)  POC reviewed with pt and spouse, both acknowledged understanding. Pt remains free of falls/injuries. Pt on telemetry remained SR. Pt blood glucose being monitored q 4. Pt tolerating npo diet. Pt pain controlled with prescribed meds. Pt on 1 L NC, denies SOB. Pt wore SCDs until 5 am, pt refused them. Pt free of falls, and wears the non skid socks. Pt  Bilateral CT to water seal, no air leaks present and output recorded. Pt voids per gates catheter, output recorded.  No acute events throughout shift. No distress noted, will continue to monitor.

## 2017-07-20 NOTE — ASSESSMENT & PLAN NOTE
POD 2 s/p Robotic Esophagectomy. On Pathway. Doing well overall.     - Chest tubes with 400cc each yesterday, will cont until tomorrow  - IVF @ 80cc/hr  - NPO  - PRN meghna

## 2017-07-20 NOTE — SUBJECTIVE & OBJECTIVE
Interval History: POD s/p robotic esophagectomy. Pt doing well, NAEON, adequate pain control. On pathway. C/o mild SOB yesterday but improving. CXRs stable without new findings      Medications:  Continuous Infusions:   dextrose 5 % and 0.45 % NaCl with KCl 20 mEq 80 mL/hr at 07/20/17 0008    hydromorphone in 0.9 % NaCl 6 mg/30 ml       Scheduled Meds:   albuterol-ipratropium 2.5mg-0.5mg/3mL  3 mL Nebulization Q6H WAKE    amlodipine  5 mg Per J Tube Daily    aspirin  150 mg Rectal Daily    enoxparin  40 mg Subcutaneous Q24H    famotidine (PF)  20 mg Intravenous Daily    insulin detemir  6 Units Subcutaneous BID    metoprolol  5 mg Intravenous Q6H    tamsulosin  0.4 mg Oral Daily     PRN Meds:dextrose 50%, diphenhydrAMINE, glucagon (human recombinant), insulin aspart, naloxone     Review of patient's allergies indicates:   Allergen Reactions    Ciprofloxacin Hives and Swelling     Objective:     Vital Signs (Most Recent):  Temp: 98.3 °F (36.8 °C) (07/20/17 0717)  Pulse: 85 (07/20/17 0725)  Resp: 16 (07/20/17 0725)  BP: 114/67 (07/20/17 0717)  SpO2: 95 % (07/20/17 0725) Vital Signs (24h Range):  Temp:  [98.1 °F (36.7 °C)-98.5 °F (36.9 °C)] 98.3 °F (36.8 °C)  Pulse:  [69-86] 85  Resp:  [11-22] 16  SpO2:  [90 %-98 %] 95 %  BP: (110-140)/(67-94) 114/67     Weight: 109.3 kg (241 lb)  Body mass index is 32.69 kg/m².    Intake/Output - Last 3 Shifts       07/18 0700 - 07/19 0659 07/19 0700 - 07/20 0659 07/20 0700 - 07/21 0659    P.O.  0     I.V. (mL/kg) 7169 (65.6) 2108.7 (19.3)     NG/GT  60     IV Piggyback  550     Total Intake(mL/kg) 7169 (65.6) 2718.7 (24.9)     Urine (mL/kg/hr) 1780 (0.7) 1700 (0.6)     Drains 45 (0) 30 (0)     Blood 150 (0.1)      Chest Tube 849 (0.3) 790 (0.3)     Total Output 2824 2520      Net +4345 +198.7                   Physical Exam    A&O, NAD  RRR  Lungs CTAB  Chest tubes in place, on water seal  Drains with SS output  ABD: S/ND/ Nancy. TTP           Significant Labs:  CBC:      Recent Labs  Lab 07/20/17  0408   WBC 5.21   RBC 3.82*   HGB 12.0*   HCT 36.4*   *   MCV 95   MCH 31.4*   MCHC 33.0     CMP:     Recent Labs  Lab 07/20/17  0408   *   CALCIUM 8.7   ALBUMIN 2.5*   PROT 5.7*      K 4.6   CO2 26      BUN 15   CREATININE 1.6*   ALKPHOS 48*   *   *   BILITOT 0.5       Significant Diagnostics:  CXR: I have reviewed all pertinent results/findings within the past 24 hours and my personal findings are:     No significant detrimental interval change in the appearance of the chest since 7/18/17 at 7:40 PM is appreciated.  No pneumothorax.    I have reviewed all pertinent imaging results/findings within the past 24 hours.

## 2017-07-20 NOTE — PLAN OF CARE
Problem: Occupational Therapy Goal  Goal: Occupational Therapy Goal  Outcome: Outcome(s) achieved Date Met: 07/20/17  D/C OT d/t pt.'s ability to complete ADL's and perform functional mobility without assistance.    Comments: D/C OT services

## 2017-07-20 NOTE — PLAN OF CARE
Problem: Patient Care Overview  Goal: Plan of Care Review  Outcome: Ongoing (interventions implemented as appropriate)  Nutrition assessment completed. Please see RD note for details.    Recommendation/Intervention:   1. As medically able, start TF from 2p-8a.   Isosource 1.5 @ 10mL/hr and increase by 10mL daily until goal rate 80mL/hr reached.      2. If Impact Peptide warranted, recommend goal rate 80mL/hr from 2p-8a.      RD to monitor.

## 2017-07-20 NOTE — PT/OT/SLP PROGRESS
Physical Therapy  Treatment    Mikie Walter Jr   MRN: [id number]   Admitting Diagnosis: Malignant neoplasm of lower third of esophagus    PT Received On: 07/20/17  PT Start Time: 0848     PT Stop Time: 0911    PT Total Time (min): 23 min       Billable Minutes:  Gait [id number] and Therapeutic Exercise 8    Treatment Type: Treatment  PT/PTA: PTA     PTA Visit Number: 1       General Precautions: Standard, fall  Orthopedic Precautions: N/A   Braces: N/A    Do you have any cultural, spiritual, Episcopal conflicts, given your current situation?: none    Subjective:  Communicated with RN prior to session.  I am ready    Pain/Comfort  Pain Rating 1: 0/10  Pain Rating Post-Intervention 1: 0/10    Objective:   Patient found with: peripheral IV, chest tube, gates catheter, PCA    Functional Mobility:  Bed Mobility:   Rolling/Turning Right: Contact guard assistance, With side rail (vcs for log rolling technique)  Supine to Sit: Contact Guard Assistance, Minimum Assistance    Transfers:  Sit <> Stand Assistance: Contact Guard Assistance  Sit <> Stand Assistive Device: No Assistive Device (HHA holding onto IV pole)    Gait:   Gait Distance: 85 ft with mild unsteadiness with one standing rest break and HHA  Assistance 1: Contact Guard Assistance, Minimum assistance  Gait Assistive Device: Hand held assist (holding onto IV pole)  Gait Pattern: swing-through gait  Gait Deviation(s): decreased mary, decreased step length, decreased stride length, decreased toe-to-floor clearance    Therapeutic Activities and Exercises:   Discussed/educated patient on progress, safety, importance of OOB mobility for improving outcomes after surgery and d/c, PT POC   Patient performed therex X 15 reps seated in bedside chair B LE AROM AP, LAQ, Hip Flexion, Hip Abd/Add   White board updated   Donned an extra gown    Pt encouraged to ambulate in hallways 3x/day with nursing or family assistance to improve endurance.   Pt safe to ambulate in hallway  with RN or PCT assistance   log rolling to decrease stress to abdominal region       AM-PAC 6 CLICK MOBILITY  How much help from another person does this patient currently need?   1 = Unable, Total/Dependent Assistance  2 = A lot, Maximum/Moderate Assistance  3 = A little, Minimum/Contact Guard/Supervision  4 = None, Modified [place]/Independent    Turning over in bed (including adjusting bedclothes, sheets and blankets)?: 3  Sitting down on and standing up from a chair with arms (e.g., wheelchair, bedside commode, etc.): 3  Moving from lying on back to sitting on the side of the bed?: 3  Moving to and from a bed to a chair (including a wheelchair)?: 3  Need to walk in hospital room?: 3  Climbing 3-5 steps with a railing?: 2  Total Score: 17    AM-PAC Raw Score CMS G-Code Modifier Level of Impairment Assistance   6 % Total / Unable   7 - 9 CM 80 - 100% Maximal Assist   10 - 14 CL 60 - 80% Moderate Assist   15 - 19 CK 40 - 60% Moderate Assist   20 - 22 CJ 20 - 40% Minimal Assist   23 CI 1-20% SBA / CGA   24 CH 0% Independent/ Mod I     Patient left up in chair with all lines intact, call button in reach, nsg notified and family present.    Assessment:  Mikie Walter Jr is a 66 y.o. male with a medical diagnosis of Malignant neoplasm of lower third of esophagus and presents with continued deficits as listed below. Pt motivated and cooperative with treatment session. Pt Progressing with PT Intervention. Pt Progressing with improving gait distance. Pt would continue to benefit from skilled PT to address overall functional mobility and goals. Goals remain appropriate      Rehab identified problem list/impairments: Rehab identified problem list/impairments: impaired endurance    Rehab potential is good.    Activity tolerance: Good    Discharge recommendations: Discharge Facility/Level Of Care Needs: home     Barriers to discharge: Barriers to Discharge: None    Equipment recommendations: Equipment Needed  After Discharge: none     GOALS:    Physical Therapy Goals        Problem: Physical Therapy Goal    Goal Priority Disciplines Outcome Goal Variances Interventions   Physical Therapy Goal     PT/OT, PT Ongoing (interventions implemented as appropriate)     Description:  PT goals until 7/25/17    1. Pt supine to sit with supervision-not met  2. Pt sit to supine with supervision-not met  3. Pt sit to stand with no AD with supervision-not met  4. Pt to perform gait 200ft with no AD with supervision.-not met  5. Pt to go up/down curb step with no AD with CGA.-not met  6. Pt to perform B LE exs in sitting x 15 reps with handout.-not met                      PLAN:    Patient to be seen 5 x/week  to address the above listed problems via gait training, therapeutic activities, therapeutic exercises  Plan of Care expires: 08/18/17  Plan of Care reviewed with: patient, spouse         George Yancey, PTA  07/20/2017

## 2017-07-20 NOTE — PROGRESS NOTES
[facility]  Endocrinology  Progress Note    Admit Date: 7/18/2017     Reason for Consult: Management of T2DM, Hyperglycemia     Surgical Procedure and Date: 7/18/17    Diabetes diagnosis year: . 20 years    Home Diabetes Medications:  Kombiglyze and Victoza stopped 1 week prior to admission    How often checking glucose at home? One   BG readings on regimen: 170-190s  Hypoglycemia on the regimen?  No  Missed doses on regimen?  No    Diabetes Complications include:   Hyperglycemia and peripheral neuropathy    Complicating diabetes co morbidities: Active Cancer    HPI:  Mr. Walter is a 66 y.o. male with a history of T2DM, HTN, CAD s/p CABG x 3 in 2011 with subsequent stent placement for bypass failure, kidney cancer s/p left nephrectomy in 11/2016, chronic GERD, and esophageal adenocarcinoma, diagnosed in 10/2016. He is s/p neoadjuvant chemoradiation, last dose of radiation on 4/25/17. He first presented back in 10/2016 with c/o 2 months of progressive dysphagia. EGD/EUS at that time revealed a tumor at the esophagogastric junction, staged T2 by ultrasound. He is now s/p esophagectomy. Endocrine consulted for bg management.         Interval HPI:    NPO, no hypoglycemia, bg at or near goal on insulin gtt    /67 (BP Location: Left arm, Patient Position: Lying, BP Method: Automatic)   Pulse 85   Temp 98.3 °F (36.8 °C) (Oral)   Resp 16   Ht 6' (1.829 m)   Wt 109.3 kg (241 lb)   SpO2 95%   BMI 32.69 kg/m²       Labs Reviewed and Include      Recent Labs  Lab 07/20/17  0408   *   CALCIUM 8.7   ALBUMIN 2.5*   PROT 5.7*      K 4.6   CO2 26      BUN 15   CREATININE 1.6*   ALKPHOS 48*   *   *   BILITOT 0.5     Lab Results   Component Value Date    WBC 5.21 07/20/2017    HGB 12.0 (L) 07/20/2017    HCT 36.4 (L) 07/20/2017    MCV 95 07/20/2017     (L) 07/20/2017     No results for input(s): TSH, FREET4 in the last 168 hours.  Lab Results   Component Value  Date    HGBA1C 6.3 (H) 07/18/2017       Nutritional status:   Body mass index is 32.69 kg/m².  Lab Results   Component Value Date    ALBUMIN 2.5 (L) 07/20/2017    ALBUMIN 2.7 (L) 07/19/2017    ALBUMIN 3.0 (L) 07/18/2017     Lab Results   Component Value Date    PREALBUMIN 34 07/10/2017       Estimated Creatinine Clearance: 57.2 mL/min (based on Cr of 1.6).    Accu-Checks  Recent Labs      07/19/17   0311  07/19/17   0400  07/19/17   0504  07/19/17   0559  07/19/17   0802  07/19/17   1200  07/19/17   1603  07/19/17   1948  07/20/17   0017  07/20/17   0431   POCTGLUCOSE  184*  182*  186*  190*  192*  172*  204*  185*  187*  187*       Current Medications and/or Treatments Impacting Glycemic Control  Immunotherapy:  Immunosuppressants     None        Steroids:   Hormones     None        Pressors:    Autonomic Drugs     None        Hyperglycemia/Diabetes Medications: Antihyperglycemics     Start     Stop Route Frequency Ordered    07/20/17 0900  insulin detemir pen 6 Units      -- SubQ 2 times daily 07/20/17 0736    07/20/17 0836  insulin aspart pen 0-5 Units      -- SubQ Every 6 hours PRN 07/20/17 0736          ASSESSMENT and PLAN    Type 2 diabetes mellitus with polyneuropathy    bg goal 140-180- d/c insulin transition gtt   Start levemir 6 u bid and low dose correction  BG checks q6h  POD #2 today  Anticipate TF to start on POD #3 (friday)    Discharge planning- on-going          CKD (chronic kidney disease), stage III    H/o nephrectomy r/t cancer  Estimated Creatinine Clearance: 57.2 mL/min (based on Cr of 1.6).  Avoid hypoglycemia           H/O esophagectomy    Per surgery          Coronary artery disease involving native coronary artery    Avoid hypoglycemia          * Malignant neoplasm of lower third of esophagus    S/p esophagectomy 7/18/17              Yadira Freedman, LUCERO, NP  Endocrinology  [facility]

## 2017-07-20 NOTE — ASSESSMENT & PLAN NOTE
bg goal 140-180- d/c insulin transition gtt   Start levemir 6 u bid and low dose correction  BG checks q6h  POD #2 today  Anticipate TF to start on POD #3 (friday)    Discharge planning- on-going

## 2017-07-20 NOTE — PROGRESS NOTES
[facility]  General Surgery  Progress Note    Subjective:     History of Present Illness:  No notes on file    Post-Op Info:  Procedure(s) (LRB):  ESOPHAGECTOMY-ROBOTIC (N/A)  PLACEMENT-TUBE-JEJUNOSTOMY (N/A)   2 Days Post-Op     Interval History: POD s/p robotic esophagectomy. Pt doing well, NAEON, adequate pain control. On pathway. C/o mild SOB yesterday but improving. CXRs stable without new findings      Medications:  Continuous Infusions:   dextrose 5 % and 0.45 % NaCl with KCl 20 mEq 80 mL/hr at 07/20/17 0008    hydromorphone in 0.9 % NaCl 6 mg/30 ml       Scheduled Meds:   albuterol-ipratropium 2.5mg-0.5mg/3mL  3 mL Nebulization Q6H WAKE    amlodipine  5 mg Per J Tube Daily    aspirin  150 mg Rectal Daily    enoxparin  40 mg Subcutaneous Q24H    famotidine (PF)  20 mg Intravenous Daily    insulin detemir  6 Units Subcutaneous BID    metoprolol  5 mg Intravenous Q6H    tamsulosin  0.4 mg Oral Daily     PRN Meds:dextrose 50%, diphenhydrAMINE, glucagon (human recombinant), insulin aspart, naloxone     Review of patient's allergies indicates:   Allergen Reactions    Ciprofloxacin Hives and Swelling     Objective:     Vital Signs (Most Recent):  Temp: 98.3 °F (36.8 °C) (07/20/17 0717)  Pulse: 85 (07/20/17 0725)  Resp: 16 (07/20/17 0725)  BP: 114/67 (07/20/17 0717)  SpO2: 95 % (07/20/17 0725) Vital Signs (24h Range):  Temp:  [98.1 °F (36.7 °C)-98.5 °F (36.9 °C)] 98.3 °F (36.8 °C)  Pulse:  [69-86] 85  Resp:  [11-22] 16  SpO2:  [90 %-98 %] 95 %  BP: (110-140)/(67-94) 114/67     Weight: 109.3 kg (241 lb)  Body mass index is 32.69 kg/m².    Intake/Output - Last 3 Shifts       07/18 0700 - 07/19 0659 07/19 0700 - 07/20 0659 07/20 0700 - 07/21 0659    P.O.  0     I.V. (mL/kg) 7169 (65.6) 2108.7 (19.3)     NG/GT  60     IV Piggyback  550     Total Intake(mL/kg) 7169 (65.6) 2718.7 (24.9)     Urine (mL/kg/hr) 1780 (0.7) 1700 (0.6)     Drains 45 (0) 30 (0)     Blood 150 (0.1)      Chest Tube  849 (0.3) 790 (0.3)     Total Output 2824 2520      Net +4345 +198.7                   Physical Exam    A&O, NAD  RRR  Lungs CTAB  Chest tubes in place, on water seal  Drains with SS output  ABD: S/ND/ Nancy. TTP           Significant Labs:  CBC:     Recent Labs  Lab 07/20/17  0408   WBC 5.21   RBC 3.82*   HGB 12.0*   HCT 36.4*   *   MCV 95   MCH 31.4*   MCHC 33.0     CMP:     Recent Labs  Lab 07/20/17  0408   *   CALCIUM 8.7   ALBUMIN 2.5*   PROT 5.7*      K 4.6   CO2 26      BUN 15   CREATININE 1.6*   ALKPHOS 48*   *   *   BILITOT 0.5       Significant Diagnostics:  CXR: I have reviewed all pertinent results/findings within the past 24 hours and my personal findings are:     No significant detrimental interval change in the appearance of the chest since 7/18/17 at 7:40 PM is appreciated.  No pneumothorax.    I have reviewed all pertinent imaging results/findings within the past 24 hours.    Assessment/Plan:     Type 2 diabetes mellitus with polyneuropathy    Endocrine consult    -monitoring         Benign essential HTN    Monitoring, vital signs WNL    - cont IV metoprolol         * Malignant neoplasm of lower third of esophagus    POD 2 s/p Robotic Esophagectomy. On Pathway. Doing well overall.     - Chest tubes with 400cc each yesterday, will cont until tomorrow  - IVF @ 80cc/hr  - NPO  - PRN meghna Carlson MD  General Surgery  [facility]

## 2017-07-20 NOTE — CONSULTS
[facility]  Adult Nutrition  Consult Note    SUMMARY     Recommendations    Recommendation/Intervention:   1. As medically able, start TF from 2p-8a.   Isosource 1.5 @ 10mL/hr and increase by 10mL daily until goal rate 80mL/hr reached.     2. If Impact Peptide warranted, recommend goal rate 80mL/hr from 2p-8a.     RD to monitor.      Goals: Pt to receive nutrition by RD follow up  Nutrition Goal Status: new  Communication of RD Recs: reviewed with RN    Reason for Assessment    Reason for Assessment: physician consult  Diagnosis: surgery/postoperative complications  Relevent Medical History: DM, CAD, CABG, GERD, kidney cancer, Esophageal adenocarcinoma         General Information Comments: s/p esophagectomy and J tube placement. No TF started. Pt remains NPO    Nutrition Discharge Planning: unable to determine at this time    Nutrition Prescription Ordered    Current Diet Order: NPO     Evaluation of Received Nutrients/Fluid Intake      % Intake of Estimated Energy Needs: 0 - 25 %  % Meal Intake: NPO     Nutrition Risk Screen     Nutrition Risk Screen: dysphagia or difficulty swallowing    Nutrition/Diet History       Typical Food/Fluid Intake: Pt remains NPO. No TF ordered at this time.  Food Preferences: No Confucianism/cultural preferences identified at this time.        Factors Affecting Nutritional Intake: NPO                Labs/Tests/Procedures/Meds       Pertinent Labs Reviewed: reviewed  Pertinent Labs Comments: Cr 1.6, POCT Glu 176-204  Pertinent Medications Reviewed: reviewed  Pertinent Medications Comments: insulin, D5    Physical Findings    Overall Physical Appearance: nourished, on oxygen therapy, obese  Tubes: jejunostomy tube     Skin: incision (drains 30mL, chest tube 790mL)    Anthropometrics    Temp: 98.3 °F (36.8 °C)     Height: 6' (182.9 cm)  Weight Method: Stated  Weight: 109.3 kg (241 lb)  Ideal Body Weight (IBW), Male: 178 lb     % Ideal Body Weight, Male (lb): 135.39 lb      BMI (Calculated): 32.8  BMI Grade: 30 - 34.9- obesity - grade I                            Estimated/Assessed Needs    Weight Used For Calorie Calculations: 109.3 kg (240 lb 15.4 oz)         Energy Need Method: Kcal/kg     30 kcal/kg (kcal): 3279 and 35 kcal/kg (kcal): 3825.5   RMR (Benzie-St. Jeor Equation): 1906        Weight Used For Protein Calculations: 109.3 kg (240 lb 15.4 oz)     1.3 gm Protein (gm): 142.39 and 1.5 gm Protein (gm): 164.29  Fluid Requirements (mL): 1ml/kcal or per MD                              Assessment and Plan    Nutrition Problem  Increased nutrient needs    Related to (etiology):   Physiological causes    Signs and Symptoms (as evidenced by):   Esophageal carcinoma requiring TF to provide nutrition.     Interventions/Recommendations (treatment strategy):  Please see RD recs above.    Nutrition Diagnosis Status:   New        Monitor and Evaluation    Food and Nutrient Intake: enteral nutrition intake  Food and Nutrient Adminstration: enteral and parenteral nutrition administration        Anthropometric Measurements: weight, weight change, body mass index  Biochemical Data, Medical Tests and Procedures: electrolyte and renal panel, gastrointestinal profile, glucose/endocrine profile, inflammatory profile, lipid profile  Nutrition-Focused Physical Findings: overall appearance    Nutrition Risk    Level of Risk: other (see comments) (f/u 2x/week)    Nutrition Follow-Up    RD Follow-up?: Yes

## 2017-07-21 PROBLEM — Z78.9 ON ENTERAL NUTRITION: Status: ACTIVE | Noted: 2017-07-21

## 2017-07-21 LAB
ALBUMIN SERPL BCP-MCNC: 2.3 G/DL
ALP SERPL-CCNC: 47 U/L
ALT SERPL W/O P-5'-P-CCNC: 127 U/L
ANION GAP SERPL CALC-SCNC: 7 MMOL/L
AST SERPL-CCNC: 62 U/L
BASOPHILS # BLD AUTO: 0.01 K/UL
BASOPHILS NFR BLD: 0.3 %
BILIRUB SERPL-MCNC: 0.6 MG/DL
BUN SERPL-MCNC: 11 MG/DL
CALCIUM SERPL-MCNC: 8.5 MG/DL
CHLORIDE SERPL-SCNC: 103 MMOL/L
CO2 SERPL-SCNC: 24 MMOL/L
CREAT SERPL-MCNC: 1.3 MG/DL
CRP SERPL-MCNC: 150.22 MG/L
DIFFERENTIAL METHOD: ABNORMAL
EOSINOPHIL # BLD AUTO: 0.1 K/UL
EOSINOPHIL NFR BLD: 1.5 %
ERYTHROCYTE [DISTWIDTH] IN BLOOD BY AUTOMATED COUNT: 12.5 %
EST. GFR  (AFRICAN AMERICAN): >60 ML/MIN/1.73 M^2
EST. GFR  (NON AFRICAN AMERICAN): 56.5 ML/MIN/1.73 M^2
GLUCOSE SERPL-MCNC: 182 MG/DL
HCT VFR BLD AUTO: 33.7 %
HGB BLD-MCNC: 11.7 G/DL
LYMPHOCYTES # BLD AUTO: 0.4 K/UL
LYMPHOCYTES NFR BLD: 12.8 %
MAGNESIUM SERPL-MCNC: 1.8 MG/DL
MCH RBC QN AUTO: 32 PG
MCHC RBC AUTO-ENTMCNC: 34.7 G/DL
MCV RBC AUTO: 92 FL
MONOCYTES # BLD AUTO: 0.3 K/UL
MONOCYTES NFR BLD: 9.9 %
NEUTROPHILS # BLD AUTO: 2.6 K/UL
NEUTROPHILS NFR BLD: 75.2 %
PHOSPHATE SERPL-MCNC: 2.7 MG/DL
PLATELET # BLD AUTO: 112 K/UL
PMV BLD AUTO: 8.7 FL
POCT GLUCOSE: 144 MG/DL (ref 70–110)
POCT GLUCOSE: 188 MG/DL (ref 70–110)
POCT GLUCOSE: 188 MG/DL (ref 70–110)
POCT GLUCOSE: 197 MG/DL (ref 70–110)
POCT GLUCOSE: 203 MG/DL (ref 70–110)
POCT GLUCOSE: 224 MG/DL (ref 70–110)
POTASSIUM SERPL-SCNC: 4 MMOL/L
PROT SERPL-MCNC: 5.6 G/DL
RBC # BLD AUTO: 3.66 M/UL
SODIUM SERPL-SCNC: 134 MMOL/L
WBC # BLD AUTO: 3.44 K/UL

## 2017-07-21 PROCEDURE — 86141 C-REACTIVE PROTEIN HS: CPT

## 2017-07-21 PROCEDURE — 63600175 PHARM REV CODE 636 W HCPCS: Performed by: SURGERY

## 2017-07-21 PROCEDURE — 80053 COMPREHEN METABOLIC PANEL: CPT

## 2017-07-21 PROCEDURE — 99232 SBSQ HOSP IP/OBS MODERATE 35: CPT | Mod: ,,, | Performed by: NURSE PRACTITIONER

## 2017-07-21 PROCEDURE — 84100 ASSAY OF PHOSPHORUS: CPT

## 2017-07-21 PROCEDURE — 85025 COMPLETE CBC W/AUTO DIFF WBC: CPT

## 2017-07-21 PROCEDURE — 25000003 PHARM REV CODE 250: Performed by: NURSE PRACTITIONER

## 2017-07-21 PROCEDURE — 25000003 PHARM REV CODE 250: Performed by: STUDENT IN AN ORGANIZED HEALTH CARE EDUCATION/TRAINING PROGRAM

## 2017-07-21 PROCEDURE — 20600001 HC STEP DOWN PRIVATE ROOM

## 2017-07-21 PROCEDURE — 36415 COLL VENOUS BLD VENIPUNCTURE: CPT

## 2017-07-21 PROCEDURE — 83735 ASSAY OF MAGNESIUM: CPT

## 2017-07-21 PROCEDURE — 25000003 PHARM REV CODE 250: Performed by: SURGERY

## 2017-07-21 PROCEDURE — 63600175 PHARM REV CODE 636 W HCPCS: Performed by: NURSE PRACTITIONER

## 2017-07-21 RX ORDER — HYDROCODONE BITARTRATE AND ACETAMINOPHEN 7.5; 325 MG/15ML; MG/15ML
30 SOLUTION ORAL EVERY 4 HOURS PRN
Status: DISCONTINUED | OUTPATIENT
Start: 2017-07-21 | End: 2017-07-21

## 2017-07-21 RX ORDER — HYDROCODONE BITARTRATE AND ACETAMINOPHEN 7.5; 325 MG/15ML; MG/15ML
15 SOLUTION ORAL EVERY 4 HOURS PRN
Status: DISCONTINUED | OUTPATIENT
Start: 2017-07-21 | End: 2017-07-21

## 2017-07-21 RX ORDER — INSULIN ASPART 100 [IU]/ML
0-5 INJECTION, SOLUTION INTRAVENOUS; SUBCUTANEOUS EVERY 4 HOURS PRN
Status: DISCONTINUED | OUTPATIENT
Start: 2017-07-21 | End: 2017-07-24 | Stop reason: HOSPADM

## 2017-07-21 RX ORDER — HYDROCODONE BITARTRATE AND ACETAMINOPHEN 7.5; 325 MG/15ML; MG/15ML
15 SOLUTION ORAL EVERY 4 HOURS PRN
Status: DISCONTINUED | OUTPATIENT
Start: 2017-07-21 | End: 2017-07-24 | Stop reason: HOSPADM

## 2017-07-21 RX ORDER — MAGNESIUM SULFATE HEPTAHYDRATE 40 MG/ML
2 INJECTION, SOLUTION INTRAVENOUS ONCE
Status: COMPLETED | OUTPATIENT
Start: 2017-07-21 | End: 2017-07-21

## 2017-07-21 RX ORDER — HYDROCODONE BITARTRATE AND ACETAMINOPHEN 7.5; 325 MG/15ML; MG/15ML
30 SOLUTION ORAL EVERY 4 HOURS PRN
Status: DISCONTINUED | OUTPATIENT
Start: 2017-07-21 | End: 2017-07-24 | Stop reason: HOSPADM

## 2017-07-21 RX ORDER — HYDROMORPHONE HYDROCHLORIDE 1 MG/ML
0.5 INJECTION, SOLUTION INTRAMUSCULAR; INTRAVENOUS; SUBCUTANEOUS
Status: DISCONTINUED | OUTPATIENT
Start: 2017-07-21 | End: 2017-07-24 | Stop reason: HOSPADM

## 2017-07-21 RX ADMIN — METOPROLOL TARTRATE 5 MG: 5 INJECTION INTRAVENOUS at 11:07

## 2017-07-21 RX ADMIN — INSULIN ASPART 1 UNITS: 100 INJECTION, SOLUTION INTRAVENOUS; SUBCUTANEOUS at 12:07

## 2017-07-21 RX ADMIN — METOPROLOL TARTRATE 5 MG: 5 INJECTION INTRAVENOUS at 06:07

## 2017-07-21 RX ADMIN — INSULIN DETEMIR 6 UNITS: 100 INJECTION, SOLUTION SUBCUTANEOUS at 08:07

## 2017-07-21 RX ADMIN — ASPIRIN 150 MG: 300 SUPPOSITORY RECTAL at 08:07

## 2017-07-21 RX ADMIN — DEXTROSE MONOHYDRATE, SODIUM CHLORIDE, AND POTASSIUM CHLORIDE: 50; 4.5; 1.49 INJECTION, SOLUTION INTRAVENOUS at 06:07

## 2017-07-21 RX ADMIN — HYDROCODONE BITARTRATE AND ACETAMINOPHEN 30 ML: 2.5; 108 SOLUTION ORAL at 02:07

## 2017-07-21 RX ADMIN — FAMOTIDINE 20 MG: 10 INJECTION, SOLUTION INTRAVENOUS at 08:07

## 2017-07-21 RX ADMIN — DIPHENHYDRAMINE HYDROCHLORIDE 12.5 MG: 50 INJECTION, SOLUTION INTRAMUSCULAR; INTRAVENOUS at 12:07

## 2017-07-21 RX ADMIN — HYDROCODONE BITARTRATE AND ACETAMINOPHEN 15 ML: 7.5; 325 SOLUTION ORAL at 10:07

## 2017-07-21 RX ADMIN — MAGNESIUM SULFATE IN WATER 2 G: 40 INJECTION, SOLUTION INTRAVENOUS at 03:07

## 2017-07-21 RX ADMIN — TAMSULOSIN HYDROCHLORIDE 0.4 MG: 0.4 CAPSULE ORAL at 08:07

## 2017-07-21 RX ADMIN — Medication 25 MG: at 03:07

## 2017-07-21 RX ADMIN — AMLODIPINE BESYLATE 5 MG: 10 TABLET ORAL at 08:07

## 2017-07-21 RX ADMIN — INSULIN ASPART 2 UNITS: 100 INJECTION, SOLUTION INTRAVENOUS; SUBCUTANEOUS at 11:07

## 2017-07-21 RX ADMIN — ENOXAPARIN SODIUM 40 MG: 100 INJECTION SUBCUTANEOUS at 11:07

## 2017-07-21 NOTE — PROGRESS NOTES
[facility]  General Surgery  Progress Note    Subjective:     History of Present Illness:  No notes on file    Post-Op Info:  Procedure(s) (LRB):  ESOPHAGECTOMY-ROBOTIC (N/A)  PLACEMENT-TUBE-JEJUNOSTOMY (N/A)   3 Days Post-Op     Interval History: POD 3 s/p robotic esophagectomy. Pt doing well, NAEON, adequate pain control. On pathway. CXRs stable without new findings      Medications:  Continuous Infusions:     Scheduled Meds:   amlodipine  5 mg Per J Tube Daily    aspirin  150 mg Rectal Daily    enoxparin  40 mg Subcutaneous Q24H    famotidine (PF)  20 mg Intravenous Daily    insulin detemir  6 Units Subcutaneous BID    metoprolol  5 mg Intravenous Q6H    tamsulosin  0.4 mg Oral Daily     PRN Meds:dextrose 50%, diphenhydrAMINE, glucagon (human recombinant), hydrocodone-apap 7.5-325 MG/15 ML, hydrocodone-apap 7.5-325 MG/15 ML, insulin aspart, naloxone     Review of patient's allergies indicates:   Allergen Reactions    Ciprofloxacin Hives and Swelling     Objective:     Vital Signs (Most Recent):  Temp: 99.9 °F (37.7 °C) (07/21/17 0800)  Pulse: 80 (07/21/17 0800)  Resp: 18 (07/21/17 0800)  BP: (!) 150/88 (07/21/17 0800)  SpO2: 97 % (07/21/17 0800) Vital Signs (24h Range):  Temp:  [97.7 °F (36.5 °C)-99.9 °F (37.7 °C)] 99.9 °F (37.7 °C)  Pulse:  [65-84] 80  Resp:  [10-22] 18  SpO2:  [96 %-98 %] 97 %  BP: (114-154)/(61-88) 150/88     Weight: 109.3 kg (241 lb)  Body mass index is 32.69 kg/m².    Intake/Output - Last 3 Shifts       07/19 0700 - 07/20 0659 07/20 0700 - 07/21 0659 07/21 0700 - 07/22 0659    P.O. 0      I.V. (mL/kg) 2108.7 (19.3) 1920 (17.6)     NG/GT 60      IV Piggyback 550      Total Intake(mL/kg) 2718.7 (24.9) 1920 (17.6)     Urine (mL/kg/hr) 1700 (0.6) 2075 (0.8)     Drains 30 (0) 15 (0)     Blood       Chest Tube 790 (0.3) 420 (0.2)     Total Output 2520 2510      Net +198.7 -590                   Physical Exam    A&O, NAD  RRR  Lungs CTAB  Chest tubes in place, on water  seal  Output decreasing slowly  Drains with SS output  ABD: S/ND/ Nancy. TTP           Significant Labs:  CBC:     Recent Labs  Lab 07/21/17  0438   WBC 3.44*   RBC 3.66*   HGB 11.7*   HCT 33.7*   *   MCV 92   MCH 32.0*   MCHC 34.7     CMP:     Recent Labs  Lab 07/21/17 0438   *   CALCIUM 8.5*   ALBUMIN 2.3*   PROT 5.6*   *   K 4.0   CO2 24      BUN 11   CREATININE 1.3   ALKPHOS 47*   *   AST 62*   BILITOT 0.6       Significant Diagnostics:  CXR: I have reviewed all pertinent results/findings within the past 24 hours and my personal findings are:     No significant detrimental interval change in the appearance of the chest since 7/18/17 at 7:40 PM is appreciated.  No pneumothorax.    I have reviewed all pertinent imaging results/findings within the past 24 hours.    Assessment/Plan:     Type 2 diabetes mellitus with polyneuropathy    Endocrine consult    -monitoring         Benign essential HTN    Monitoring, vital signs WNL    - cont IV metoprolol         * Malignant neoplasm of lower third of esophagus    POD 3 s/p Robotic Esophagectomy. On Pathway. Doing well overall.     - Chest tubes with 150cc each yesterday, will d/c L chest drain today  - d/c IVF  - d/c PCA, switch to Hycet   - NPO  - PRN meghna Carlson MD   [phone]  General Surgery [facility]

## 2017-07-21 NOTE — PROGRESS NOTES
[facility]  Endocrinology  Progress Note    Admit Date: 7/18/2017     Reason for Consult: Management of T2DM, Hyperglycemia     Surgical Procedure and Date: 7/18/17    Diabetes diagnosis year: . 20 years    Home Diabetes Medications:  Kombiglyze and Victoza stopped 1 week prior to admission    How often checking glucose at home? One   BG readings on regimen: 170-190s  Hypoglycemia on the regimen?  No  Missed doses on regimen?  No    Diabetes Complications include:   Hyperglycemia and peripheral neuropathy    Complicating diabetes co morbidities: Active Cancer    HPI:  Mr. Walter is a 66 y.o. male with a history of T2DM, HTN, CAD s/p CABG x 3 in 2011 with subsequent stent placement for bypass failure, kidney cancer s/p left nephrectomy in 11/2016, chronic GERD, and esophageal adenocarcinoma, diagnosed in 10/2016. He is s/p neoadjuvant chemoradiation, last dose of radiation on 4/25/17. He first presented back in 10/2016 with c/o 2 months of progressive dysphagia. EGD/EUS at that time revealed a tumor at the esophagogastric junction, staged T2 by ultrasound. He is now s/p esophagectomy. Endocrine consulted for bg management.       Interval HPI:   Overnight events: POD #3, he is feeling better today. FBG at goal on Levemir 6 units BID. BG slightly above goal yesterday requiring correction scale coverage. Plan to start cyclic TF today at 2PM    Eating:   NPO  Nausea: No  Hypoglycemia and intervention: No  Fever: No  TPN and/or TF: Yes  If yes, type of TF/TPN and rate: Cyclic TF Isosource 1.5 briseida from 2PM -8AM @ 10ml/hr today. Goal is 80 ml/hr- plan to increase by 10 ml/hr daily     /85 (BP Location: Left arm, Patient Position: Lying, BP Method: Automatic)   Pulse 83   Temp 98.8 °F (37.1 °C) (Oral)   Resp 18   Ht 6' (1.829 m)   Wt 109.3 kg (241 lb)   SpO2 98%   BMI 32.69 kg/m²       Labs Reviewed and Include      Recent Labs  Lab 07/21/17  8866   *   CALCIUM 8.5*   ALBUMIN 2.3*    PROT 5.6*   *   K 4.0   CO2 24      BUN 11   CREATININE 1.3   ALKPHOS 47*   *   AST 62*   BILITOT 0.6     Lab Results   Component Value Date    WBC 3.44 (L) 07/21/2017    HGB 11.7 (L) 07/21/2017    HCT 33.7 (L) 07/21/2017    MCV 92 07/21/2017     (L) 07/21/2017     No results for input(s): TSH, FREET4 in the last 168 hours.  Lab Results   Component Value Date    HGBA1C 6.3 (H) 07/18/2017       Nutritional status:   Body mass index is 32.69 kg/m².  Lab Results   Component Value Date    ALBUMIN 2.3 (L) 07/21/2017    ALBUMIN 2.5 (L) 07/20/2017    ALBUMIN 2.7 (L) 07/19/2017     Lab Results   Component Value Date    PREALBUMIN 34 07/10/2017       Estimated Creatinine Clearance: 70.4 mL/min (based on Cr of 1.3).    Accu-Checks  Recent Labs      07/19/17   1603  07/19/17   1948  07/20/17   0017  07/20/17   0431  07/20/17   1205  07/20/17   1746  07/20/17   2109  07/21/17   0008  07/21/17   0606  07/21/17   1152   POCTGLUCOSE  204*  185*  187*  187*  226*  211*  218*  224*  144*  203*       Current Medications and/or Treatments Impacting Glycemic Control  Immunotherapy:  Immunosuppressants     None        Steroids:   Hormones     None        Pressors:    Autonomic Drugs     None        Hyperglycemia/Diabetes Medications: Antihyperglycemics     Start     Stop Route Frequency Ordered    07/21/17 1400  insulin aspart pen 0-5 Units      -- SubQ Every 4 hours PRN 07/21/17 1253    07/20/17 0900  insulin detemir pen 6 Units      -- SubQ 2 times daily 07/20/17 0736          ASSESSMENT and PLAN    Type 2 diabetes mellitus with polyneuropathy    bg goal 140-180- FBG at goal. Starting TF today.   Continue Levemir 6 units BID.   Change BG monitoring to q4h with low dose correction scale. Monitor for prandial rises with the cyclic TF.       Discharge planning- ongoing  Discussed with patient that he may require insulin at discharge- both basal and TF coverage.         * Malignant neoplasm of lower third of  esophagus    S/p esophagectomy 7/18/17          H/O esophagectomy    Per surgery          CKD (chronic kidney disease), stage III    H/o nephrectomy r/t cancer  Estimated Creatinine Clearance: 70.4 mL/min (based on Cr of 1.3).  Avoid hypoglycemia           Coronary artery disease involving native coronary artery    Avoid hypoglycemia       On enteral nutrition.   May cause prandial rises in BG readings requiring insulin coverage.        Orquidea Phoenix NP  Endocrinology  [facility]

## 2017-07-21 NOTE — SUBJECTIVE & OBJECTIVE
Interval HPI:   Overnight events: POD #3, he is feeling better today. FBG at goal on Levemir 6 units BID. BG slightly above goal yesterday requiring correction scale coverage. Plan to start cyclic TF today at 2PM   Eating:   NPO  Nausea: No  Hypoglycemia and intervention: No  Fever: No  TPN and/or TF: Yes  If yes, type of TF/TPN and rate: Cyclic TF Isosource 1.5 briseida from 2PM -8AM @ 100ml/hr today. Goal is 80 ml/hr- plan to increase by 10 ml/hr daily     /85 (BP Location: Left arm, Patient Position: Lying, BP Method: Automatic)   Pulse 83   Temp 98.8 °F (37.1 °C) (Oral)   Resp 18   Ht 6' (1.829 m)   Wt 109.3 kg (241 lb)   SpO2 98%   BMI 32.69 kg/m²     Labs Reviewed and Include      Recent Labs  Lab 07/21/17  0438   *   CALCIUM 8.5*   ALBUMIN 2.3*   PROT 5.6*   *   K 4.0   CO2 24      BUN 11   CREATININE 1.3   ALKPHOS 47*   *   AST 62*   BILITOT 0.6     Lab Results   Component Value Date    WBC 3.44 (L) 07/21/2017    HGB 11.7 (L) 07/21/2017    HCT 33.7 (L) 07/21/2017    MCV 92 07/21/2017     (L) 07/21/2017     No results for input(s): TSH, FREET4 in the last 168 hours.  Lab Results   Component Value Date    HGBA1C 6.3 (H) 07/18/2017       Nutritional status:   Body mass index is 32.69 kg/m².  Lab Results   Component Value Date    ALBUMIN 2.3 (L) 07/21/2017    ALBUMIN 2.5 (L) 07/20/2017    ALBUMIN 2.7 (L) 07/19/2017     Lab Results   Component Value Date    PREALBUMIN 34 07/10/2017       Estimated Creatinine Clearance: 70.4 mL/min (based on Cr of 1.3).    Accu-Checks  Recent Labs      07/19/17   1603  07/19/17   1948  07/20/17   0017  07/20/17   0431  07/20/17   1205  07/20/17   1746  07/20/17   2109  07/21/17   0008  07/21/17   0606  07/21/17   1152   POCTGLUCOSE  204*  185*  187*  187*  226*  211*  218*  224*  144*  203*       Current Medications and/or Treatments Impacting Glycemic Control  Immunotherapy:  Immunosuppressants     None        Steroids:   Hormones     None         Pressors:    Autonomic Drugs     None        Hyperglycemia/Diabetes Medications: Antihyperglycemics     Start     Stop Route Frequency Ordered    07/21/17 1400  insulin aspart pen 0-5 Units      -- SubQ Every 4 hours PRN 07/21/17 1253    07/20/17 0900  insulin detemir pen 6 Units      -- SubQ 2 times daily 07/20/17 0736

## 2017-07-21 NOTE — ASSESSMENT & PLAN NOTE
POD 3 s/p Robotic Esophagectomy. On Pathway. Doing well overall.     - Chest tubes with 150cc each yesterday, will d/c L chest drain today  - d/c IVF  - d/c PCA, switch to Hycet   - NPO  - PRN lytes

## 2017-07-21 NOTE — ASSESSMENT & PLAN NOTE
bg goal 140-180- FBG at goal. Starting TF today.   Continue Levemir 6 units BID.   Change BG monitoring to q4h with low dose correction scale. Monitor for prandial rises with the cyclic TF.       Discharge planning- ongoing  Discussed with patient that he may require insulin at discharge- both basal and TF coverage.

## 2017-07-21 NOTE — PLAN OF CARE
Problem: Patient Care Overview  Goal: Plan of Care Review  Plan of care reviewed with patient and family and all questions and concerns addressed. Pt tolerated removal of lt chest tube with no complaints. Courtney out and patient voided within 6 hours of removal. Pt has complaints of pain or SOB. Patient ambulated in hallway to room with no complaints of pain, dizziness, or SOB. Pt tolerating tube feedings with no complaints of pain or Nausea. Pt resting comfortably in bed with family at bedside. Will continue to monitor patient

## 2017-07-21 NOTE — ASSESSMENT & PLAN NOTE
H/o nephrectomy r/t cancer  Estimated Creatinine Clearance: 70.4 mL/min (based on Cr of 1.3).  Avoid hypoglycemia

## 2017-07-21 NOTE — PLAN OF CARE
Problem: Patient Care Overview  Goal: Individualization & Mutuality  Outcome: Ongoing (interventions implemented as appropriate)  POC reviewed with pt and spouse, both acknowledged understanding. Pt AAO x 4 during the shift. Pt remains free of falls/injuries. Pt on telemetry remained SR. Pt blood glucose being monitored q 6. Pt tolerating NPO diet. Pt pain controlled with prescribed meds. CT to water seal output recorded. Gavin drain to bulb suction, w/ output recorded. Pt on room air denies SOB. No acute events throughout shift. No distress noted, will continue to monitor.

## 2017-07-21 NOTE — SUBJECTIVE & OBJECTIVE
Interval History: POD 3 s/p robotic esophagectomy. Pt doing well, NAEON, adequate pain control. On pathway. CXRs stable without new findings      Medications:  Continuous Infusions:     Scheduled Meds:   amlodipine  5 mg Per J Tube Daily    aspirin  150 mg Rectal Daily    enoxparin  40 mg Subcutaneous Q24H    famotidine (PF)  20 mg Intravenous Daily    insulin detemir  6 Units Subcutaneous BID    metoprolol  5 mg Intravenous Q6H    tamsulosin  0.4 mg Oral Daily     PRN Meds:dextrose 50%, diphenhydrAMINE, glucagon (human recombinant), hydrocodone-apap 7.5-325 MG/15 ML, hydrocodone-apap 7.5-325 MG/15 ML, insulin aspart, naloxone     Review of patient's allergies indicates:   Allergen Reactions    Ciprofloxacin Hives and Swelling     Objective:     Vital Signs (Most Recent):  Temp: 99.9 °F (37.7 °C) (07/21/17 0800)  Pulse: 80 (07/21/17 0800)  Resp: 18 (07/21/17 0800)  BP: (!) 150/88 (07/21/17 0800)  SpO2: 97 % (07/21/17 0800) Vital Signs (24h Range):  Temp:  [97.7 °F (36.5 °C)-99.9 °F (37.7 °C)] 99.9 °F (37.7 °C)  Pulse:  [65-84] 80  Resp:  [10-22] 18  SpO2:  [96 %-98 %] 97 %  BP: (114-154)/(61-88) 150/88     Weight: 109.3 kg (241 lb)  Body mass index is 32.69 kg/m².    Intake/Output - Last 3 Shifts       07/19 0700 - 07/20 0659 07/20 0700 - 07/21 0659 07/21 0700 - 07/22 0659    P.O. 0      I.V. (mL/kg) 2108.7 (19.3) 1920 (17.6)     NG/GT 60      IV Piggyback 550      Total Intake(mL/kg) 2718.7 (24.9) 1920 (17.6)     Urine (mL/kg/hr) 1700 (0.6) 2075 (0.8)     Drains 30 (0) 15 (0)     Blood       Chest Tube 790 (0.3) 420 (0.2)     Total Output 2520 2510      Net +198.7 -590                   Physical Exam    A&O, NAD  RRR  Lungs CTAB  Chest tubes in place, on water seal  Output decreasing slowly  Drains with SS output  ABD: S/ND/ Nancy. TTP           Significant Labs:  CBC:     Recent Labs  Lab 07/21/17  0438   WBC 3.44*   RBC 3.66*   HGB 11.7*   HCT 33.7*   *   MCV 92   MCH 32.0*   MCHC 34.7     CMP:      Recent Labs  Lab 07/21/17  0438   *   CALCIUM 8.5*   ALBUMIN 2.3*   PROT 5.6*   *   K 4.0   CO2 24      BUN 11   CREATININE 1.3   ALKPHOS 47*   *   AST 62*   BILITOT 0.6       Significant Diagnostics:  CXR: I have reviewed all pertinent results/findings within the past 24 hours and my personal findings are:     No significant detrimental interval change in the appearance of the chest since 7/18/17 at 7:40 PM is appreciated.  No pneumothorax.    I have reviewed all pertinent imaging results/findings within the past 24 hours.

## 2017-07-22 LAB
ALBUMIN SERPL BCP-MCNC: 2.5 G/DL
ALP SERPL-CCNC: 55 U/L
ALT SERPL W/O P-5'-P-CCNC: 82 U/L
ANION GAP SERPL CALC-SCNC: 9 MMOL/L
AST SERPL-CCNC: 32 U/L
BASOPHILS # BLD AUTO: 0.02 K/UL
BASOPHILS NFR BLD: 0.6 %
BILIRUB SERPL-MCNC: 0.7 MG/DL
BLD PROD TYP BPU: NORMAL
BLD PROD TYP BPU: NORMAL
BLOOD UNIT EXPIRATION DATE: NORMAL
BLOOD UNIT EXPIRATION DATE: NORMAL
BLOOD UNIT TYPE CODE: 5100
BLOOD UNIT TYPE CODE: 5100
BLOOD UNIT TYPE: NORMAL
BLOOD UNIT TYPE: NORMAL
BUN SERPL-MCNC: 13 MG/DL
CALCIUM SERPL-MCNC: 8.8 MG/DL
CHLORIDE SERPL-SCNC: 101 MMOL/L
CO2 SERPL-SCNC: 24 MMOL/L
CODING SYSTEM: NORMAL
CODING SYSTEM: NORMAL
CREAT SERPL-MCNC: 1.4 MG/DL
DIFFERENTIAL METHOD: ABNORMAL
DISPENSE STATUS: NORMAL
DISPENSE STATUS: NORMAL
EOSINOPHIL # BLD AUTO: 0.1 K/UL
EOSINOPHIL NFR BLD: 3.1 %
ERYTHROCYTE [DISTWIDTH] IN BLOOD BY AUTOMATED COUNT: 12.4 %
EST. GFR  (AFRICAN AMERICAN): 59.7 ML/MIN/1.73 M^2
EST. GFR  (NON AFRICAN AMERICAN): 51.6 ML/MIN/1.73 M^2
GLUCOSE SERPL-MCNC: 189 MG/DL
HCT VFR BLD AUTO: 35.8 %
HGB BLD-MCNC: 12.1 G/DL
LYMPHOCYTES # BLD AUTO: 0.5 K/UL
LYMPHOCYTES NFR BLD: 14.7 %
MAGNESIUM SERPL-MCNC: 1.8 MG/DL
MCH RBC QN AUTO: 31.4 PG
MCHC RBC AUTO-ENTMCNC: 33.8 G/DL
MCV RBC AUTO: 93 FL
MONOCYTES # BLD AUTO: 0.4 K/UL
MONOCYTES NFR BLD: 13.4 %
NEUTROPHILS # BLD AUTO: 2.2 K/UL
NEUTROPHILS NFR BLD: 67.9 %
PHOSPHATE SERPL-MCNC: 3 MG/DL
PLATELET # BLD AUTO: 142 K/UL
PMV BLD AUTO: 9.4 FL
POCT GLUCOSE: 132 MG/DL (ref 70–110)
POCT GLUCOSE: 132 MG/DL (ref 70–110)
POCT GLUCOSE: 145 MG/DL (ref 70–110)
POCT GLUCOSE: 151 MG/DL (ref 70–110)
POCT GLUCOSE: 180 MG/DL (ref 70–110)
POCT GLUCOSE: 191 MG/DL (ref 70–110)
POCT GLUCOSE: 198 MG/DL (ref 70–110)
POTASSIUM SERPL-SCNC: 3.8 MMOL/L
PROT SERPL-MCNC: 5.9 G/DL
RBC # BLD AUTO: 3.85 M/UL
SODIUM SERPL-SCNC: 134 MMOL/L
TRANS ERYTHROCYTES VOL PATIENT: NORMAL ML
TRANS ERYTHROCYTES VOL PATIENT: NORMAL ML
WBC # BLD AUTO: 3.2 K/UL

## 2017-07-22 PROCEDURE — 83735 ASSAY OF MAGNESIUM: CPT

## 2017-07-22 PROCEDURE — 85025 COMPLETE CBC W/AUTO DIFF WBC: CPT

## 2017-07-22 PROCEDURE — 63600175 PHARM REV CODE 636 W HCPCS: Performed by: SURGERY

## 2017-07-22 PROCEDURE — 63600175 PHARM REV CODE 636 W HCPCS: Performed by: NURSE PRACTITIONER

## 2017-07-22 PROCEDURE — 84100 ASSAY OF PHOSPHORUS: CPT

## 2017-07-22 PROCEDURE — 80053 COMPREHEN METABOLIC PANEL: CPT

## 2017-07-22 PROCEDURE — 25000003 PHARM REV CODE 250: Performed by: NURSE PRACTITIONER

## 2017-07-22 PROCEDURE — 20600001 HC STEP DOWN PRIVATE ROOM

## 2017-07-22 PROCEDURE — 99232 SBSQ HOSP IP/OBS MODERATE 35: CPT | Mod: ,,, | Performed by: NURSE PRACTITIONER

## 2017-07-22 PROCEDURE — 36415 COLL VENOUS BLD VENIPUNCTURE: CPT

## 2017-07-22 RX ADMIN — FAMOTIDINE 20 MG: 10 INJECTION, SOLUTION INTRAVENOUS at 08:07

## 2017-07-22 RX ADMIN — INSULIN DETEMIR 7 UNITS: 100 INJECTION, SOLUTION SUBCUTANEOUS at 08:07

## 2017-07-22 RX ADMIN — TAMSULOSIN HYDROCHLORIDE 0.4 MG: 0.4 CAPSULE ORAL at 08:07

## 2017-07-22 RX ADMIN — ASPIRIN 150 MG: 300 SUPPOSITORY RECTAL at 08:07

## 2017-07-22 RX ADMIN — HYDROCODONE BITARTRATE AND ACETAMINOPHEN 15 ML: 7.5; 325 SOLUTION ORAL at 11:07

## 2017-07-22 RX ADMIN — ENOXAPARIN SODIUM 40 MG: 100 INJECTION SUBCUTANEOUS at 12:07

## 2017-07-22 RX ADMIN — Medication 25 MG: at 08:07

## 2017-07-22 RX ADMIN — INSULIN DETEMIR 6 UNITS: 100 INJECTION, SOLUTION SUBCUTANEOUS at 08:07

## 2017-07-22 RX ADMIN — AMLODIPINE BESYLATE 5 MG: 10 TABLET ORAL at 08:07

## 2017-07-22 NOTE — ASSESSMENT & PLAN NOTE
H/o nephrectomy r/t cancer  Estimated Creatinine Clearance: 65.4 mL/min (based on Cr of 1.4).  Avoid hypoglycemia

## 2017-07-22 NOTE — PLAN OF CARE
Problem: Fall Risk (Adult)  Goal: Absence of Falls  Patient will demonstrate the desired outcomes by discharge/transition of care.   Outcome: Ongoing (interventions implemented as appropriate)  POC reviewed with pt and spouse, both acknowledged understanding. Pt remains free of falls/injuries. Pt on telemetry remains SR. Pt blood glucose being monitored q 4. Pt tolerating NPO diet and TF @ 20 ml/hr. Pt pain controlled with prescribed meds. CT to water seal, no air leak present and output recorded. Pt voids per urinal. No acute events throughout shift. No distress noted, will continue to monitor.

## 2017-07-22 NOTE — PROGRESS NOTES
[facility]  General Surgery  Progress Note    Subjective:     History of Present Illness:  No notes on file    Post-Op Info:  Procedure(s) (LRB):  ESOPHAGECTOMY-ROBOTIC (N/A)  PLACEMENT-TUBE-JEJUNOSTOMY (N/A)   4 Days Post-Op     Interval History: POD 4 s/p robotic esophagectomy. Pt doing well, NAEON, adequate pain control. On pathway. CXRs stable without new findings      Medications:  Continuous Infusions:     Scheduled Meds:   amlodipine  5 mg Per J Tube Daily    aspirin  150 mg Rectal Daily    enoxparin  40 mg Subcutaneous Q24H    famotidine (PF)  20 mg Intravenous Daily    [START ON 7/23/2017] insulin detemir  6 Units Subcutaneous Daily    insulin detemir  7 Units Subcutaneous QHS    metoprolol  25 mg Per J Tube BID    tamsulosin  0.4 mg Oral Daily     PRN Meds:dextrose 50%, glucagon (human recombinant), hydrocodone-apap 7.5-325 MG/15 ML, hydrocodone-apap 7.5-325 MG/15 ML, HYDROmorphone, insulin aspart     Review of patient's allergies indicates:   Allergen Reactions    Ciprofloxacin Hives and Swelling     Objective:     Vital Signs (Most Recent):  Temp: 98.5 °F (36.9 °C) (07/22/17 0800)  Pulse: 100 (07/22/17 0800)  Resp: 16 (07/22/17 0800)  BP: 116/78 (07/22/17 0800)  SpO2: 97 % (07/22/17 0420) Vital Signs (24h Range):  Temp:  [98 °F (36.7 °C)-98.8 °F (37.1 °C)] 98.5 °F (36.9 °C)  Pulse:  [] 100  Resp:  [16-19] 16  SpO2:  [94 %-98 %] 97 %  BP: (116-158)/(60-85) 116/78     Weight: 109.3 kg (241 lb)  Body mass index is 32.69 kg/m².    Intake/Output - Last 3 Shifts       07/20 0700 - 07/21 0659 07/21 0700 - 07/22 0659 07/22 0700 - 07/23 0659    P.O.  0     I.V. (mL/kg) 1920 (17.6)      NG/GT  520     IV Piggyback       Total Intake(mL/kg) 1920 (17.6) 520 (4.8)     Urine (mL/kg/hr) 2075 (0.8) 1100 (0.4)     Drains 15 (0) 15 (0)     Chest Tube 420 (0.2) 190 (0.1)     Total Output 2510 1305      Net -461 -481                   Physical Exam    A&O, NAD  RRR  Lungs CTAB  R Chest  tube in place, on water seal  Output decreasing slowly  Drains with SS output  ABD: S/ND/ Nancy. TTP           Significant Labs:  CBC:     Recent Labs  Lab 07/22/17  0603   WBC 3.20*   RBC 3.85*   HGB 12.1*   HCT 35.8*   *   MCV 93   MCH 31.4*   MCHC 33.8     CMP:     Recent Labs  Lab 07/22/17  0603   *   CALCIUM 8.8   ALBUMIN 2.5*   PROT 5.9*   *   K 3.8   CO2 24      BUN 13   CREATININE 1.4   ALKPHOS 55   ALT 82*   AST 32   BILITOT 0.7       Significant Diagnostics:  CXR: I have reviewed all pertinent results/findings within the past 24 hours and my personal findings are:     No significant detrimental interval change in the appearance of the chest since 7/18/17 at 7:40 PM is appreciated.  No pneumothorax.    I have reviewed all pertinent imaging results/findings within the past 24 hours.    Assessment/Plan:     Type 2 diabetes mellitus with polyneuropathy    Endocrine consult    -monitoring         CKD (chronic kidney disease), stage III    Cr stable        Benign essential HTN    Monitoring, vital signs WNL    - cont IV metoprolol         * Malignant neoplasm of lower third of esophagus    POD 4 s/p Robotic Esophagectomy. On Pathway. Doing well overall.     - Will d/c R chest tube today  - Tube feeds tonight at 20cc/hr, increase by 10cc/night for a goal of 80cc/hr 8pm-8am  - Cont Hycet   - NPO  - PRN meghna Carlson MD   [phone]  General Surgery [facility]

## 2017-07-22 NOTE — PROGRESS NOTES
[facility]  Endocrinology  Progress Note    Admit Date: 7/18/2017     Reason for Consult: Management of T2DM, Hyperglycemia     Surgical Procedure and Date: 7/18/17    Diabetes diagnosis year: . 20 years    Home Diabetes Medications:  Kombiglyze and Victoza stopped 1 week prior to admission    How often checking glucose at home? One   BG readings on regimen: 170-190s  Hypoglycemia on the regimen?  No  Missed doses on regimen?  No    Diabetes Complications include:   Hyperglycemia and peripheral neuropathy    Complicating diabetes co morbidities: Active Cancer    HPI:  Mr. Walter is a 66 y.o. male with a history of T2DM, HTN, CAD s/p CABG x 3 in 2011 with subsequent stent placement for bypass failure, kidney cancer s/p left nephrectomy in 11/2016, chronic GERD, and esophageal adenocarcinoma, diagnosed in 10/2016. He is s/p neoadjuvant chemoradiation, last dose of radiation on 4/25/17. He first presented back in 10/2016 with c/o 2 months of progressive dysphagia. EGD/EUS at that time revealed a tumor at the esophagogastric junction, staged T2 by ultrasound. He is now s/p esophagectomy. Endocrine consulted for bg management.         Interval HPI:   Overnight events: POD #4, recovering on GISSU. Started TF yesterday, titrating by 10cc/hr every day until reach goal of 80cc/hr.   Eating:   NPO  Nausea: No  Hypoglycemia and intervention: No  Fever: No  TF: Yes  If yes, type of TF and rate: 20cc/hr today 2pm - 8am    /78 (BP Location: Right arm, Patient Position: Lying, BP Method: Automatic)   Pulse 100   Temp 98.5 °F (36.9 °C) (Oral)   Resp 16   Ht 6' (1.829 m)   Wt 109.3 kg (241 lb)   SpO2 97%   BMI 32.69 kg/m²       Labs Reviewed and Include      Recent Labs  Lab 07/22/17  0603   *   CALCIUM 8.8   ALBUMIN 2.5*   PROT 5.9*   *   K 3.8   CO2 24      BUN 13   CREATININE 1.4   ALKPHOS 55   ALT 82*   AST 32   BILITOT 0.7     Lab Results   Component Value Date    WBC 3.20 (L)  07/22/2017    HGB 12.1 (L) 07/22/2017    HCT 35.8 (L) 07/22/2017    MCV 93 07/22/2017     (L) 07/22/2017     No results for input(s): TSH, FREET4 in the last 168 hours.  Lab Results   Component Value Date    HGBA1C 6.3 (H) 07/18/2017     Nutritional status:   Body mass index is 32.69 kg/m².  Lab Results   Component Value Date    ALBUMIN 2.5 (L) 07/22/2017    ALBUMIN 2.3 (L) 07/21/2017    ALBUMIN 2.5 (L) 07/20/2017     Lab Results   Component Value Date    PREALBUMIN 34 07/10/2017       Estimated Creatinine Clearance: 65.4 mL/min (based on Cr of 1.4).    Accu-Checks  Recent Labs      07/20/17   1746  07/20/17   2109  07/21/17   0008  07/21/17   0606  07/21/17   0858  07/21/17   1152  07/21/17   1552  07/21/17   2055  07/22/17   0031  07/22/17   0442   POCTGLUCOSE  211*  218*  224*  144*  197*  203*  188*  188*  198*  191*       Hyperglycemia/Diabetes Medications: Antihyperglycemics     Start     Stop Route Frequency Ordered    07/23/17 0900  insulin detemir pen 6 Units      -- SubQ BID 07/22/17 1047    07/21/17 1400  insulin aspart pen 0-5 Units      -- SubQ Every 4 hours PRN 07/21/17 1253          ASSESSMENT and PLAN    Type 2 diabetes mellitus with polyneuropathy    bg goal 140-180- FBG at goal. Continue Levemir 6 units AM, increase to 7 units PM.   Continue BG monitoring q4h with low dose correction scale. Monitor for prandial rises with the cyclic TF.       Discharge planning-  A1c 6.3%; do not recommend DPP4i with GLP1 RA.   Discussed with patient that he may require insulin at discharge- both basal and TF coverage.         * Malignant neoplasm of lower third of esophagus    S/p esophagectomy 7/18/17; now NPO with TF          H/O esophagectomy    Per surgery          Coronary artery disease involving native coronary artery of native heart without angina pectoris    avoid hypoglycemia            CKD (chronic kidney disease), stage III    H/o nephrectomy r/t cancer  Estimated Creatinine Clearance: 65.4  mL/min (based on Cr of 1.4).  Avoid hypoglycemia           On enteral nutrition    May cause prandial rises in BG readings requiring insulin coverage.           plan of care discussed with pt, wife and RN at bedside.     CARRI Case,ANP-C  Endocrinology  [facility]

## 2017-07-22 NOTE — SUBJECTIVE & OBJECTIVE
Interval HPI:   Overnight events: POD #4, recovering on GISSU. Started TF yesterday, titrating by 10cc/hr every day until reach goal of 80cc/hr.   Eating:   NPO  Nausea: No  Hypoglycemia and intervention: No  Fever: No  TF: Yes  If yes, type of TF and rate: 20cc/hr today 2pm - 8am    /78 (BP Location: Right arm, Patient Position: Lying, BP Method: Automatic)   Pulse 100   Temp 98.5 °F (36.9 °C) (Oral)   Resp 16   Ht 6' (1.829 m)   Wt 109.3 kg (241 lb)   SpO2 97%   BMI 32.69 kg/m²     Labs Reviewed and Include      Recent Labs  Lab 07/22/17  0603   *   CALCIUM 8.8   ALBUMIN 2.5*   PROT 5.9*   *   K 3.8   CO2 24      BUN 13   CREATININE 1.4   ALKPHOS 55   ALT 82*   AST 32   BILITOT 0.7     Lab Results   Component Value Date    WBC 3.20 (L) 07/22/2017    HGB 12.1 (L) 07/22/2017    HCT 35.8 (L) 07/22/2017    MCV 93 07/22/2017     (L) 07/22/2017     No results for input(s): TSH, FREET4 in the last 168 hours.  Lab Results   Component Value Date    HGBA1C 6.3 (H) 07/18/2017     Nutritional status:   Body mass index is 32.69 kg/m².  Lab Results   Component Value Date    ALBUMIN 2.5 (L) 07/22/2017    ALBUMIN 2.3 (L) 07/21/2017    ALBUMIN 2.5 (L) 07/20/2017     Lab Results   Component Value Date    PREALBUMIN 34 07/10/2017       Estimated Creatinine Clearance: 65.4 mL/min (based on Cr of 1.4).    Accu-Checks  Recent Labs      07/20/17   1746  07/20/17   2109  07/21/17   0008  07/21/17   0606  07/21/17   0858  07/21/17   1152  07/21/17   1552  07/21/17   2055  07/22/17   0031  07/22/17   0442   POCTGLUCOSE  211*  218*  224*  144*  197*  203*  188*  188*  198*  191*       Hyperglycemia/Diabetes Medications: Antihyperglycemics     Start     Stop Route Frequency Ordered    07/23/17 0900  insulin detemir pen 6 Units      -- SubQ BID 07/22/17 1047    07/21/17 1400  insulin aspart pen 0-5 Units      -- SubQ Every 4 hours PRN 07/21/17 1253

## 2017-07-22 NOTE — ASSESSMENT & PLAN NOTE
POD 4 s/p Robotic Esophagectomy. On Pathway. Doing well overall.     - Will d/c R chest tube today  - Tube feeds tonight at 20cc/hr, increase by 10cc/night for a goal of 80cc/hr 8pm-8am  - Cont Hycet   - NPO  - PRN lytes

## 2017-07-22 NOTE — SUBJECTIVE & OBJECTIVE
Interval History: POD 4 s/p robotic esophagectomy. Pt doing well, NAEON, adequate pain control. On pathway. CXRs stable without new findings      Medications:  Continuous Infusions:     Scheduled Meds:   amlodipine  5 mg Per J Tube Daily    aspirin  150 mg Rectal Daily    enoxparin  40 mg Subcutaneous Q24H    famotidine (PF)  20 mg Intravenous Daily    [START ON 7/23/2017] insulin detemir  6 Units Subcutaneous Daily    insulin detemir  7 Units Subcutaneous QHS    metoprolol  25 mg Per J Tube BID    tamsulosin  0.4 mg Oral Daily     PRN Meds:dextrose 50%, glucagon (human recombinant), hydrocodone-apap 7.5-325 MG/15 ML, hydrocodone-apap 7.5-325 MG/15 ML, HYDROmorphone, insulin aspart     Review of patient's allergies indicates:   Allergen Reactions    Ciprofloxacin Hives and Swelling     Objective:     Vital Signs (Most Recent):  Temp: 98.5 °F (36.9 °C) (07/22/17 0800)  Pulse: 100 (07/22/17 0800)  Resp: 16 (07/22/17 0800)  BP: 116/78 (07/22/17 0800)  SpO2: 97 % (07/22/17 0420) Vital Signs (24h Range):  Temp:  [98 °F (36.7 °C)-98.8 °F (37.1 °C)] 98.5 °F (36.9 °C)  Pulse:  [] 100  Resp:  [16-19] 16  SpO2:  [94 %-98 %] 97 %  BP: (116-158)/(60-85) 116/78     Weight: 109.3 kg (241 lb)  Body mass index is 32.69 kg/m².    Intake/Output - Last 3 Shifts       07/20 0700 - 07/21 0659 07/21 0700 - 07/22 0659 07/22 0700 - 07/23 0659    P.O.  0     I.V. (mL/kg) 1920 (17.6)      NG/GT  520     IV Piggyback       Total Intake(mL/kg) 1920 (17.6) 520 (4.8)     Urine (mL/kg/hr) 2075 (0.8) 1100 (0.4)     Drains 15 (0) 15 (0)     Chest Tube 420 (0.2) 190 (0.1)     Total Output 2510 1305      Net -590 -898                   Physical Exam    A&O, NAD  RRR  Lungs CTAB  R Chest tube in place, on water seal  Output decreasing slowly  Drains with SS output  ABD: S/ND/ Nancy. TTP           Significant Labs:  CBC:     Recent Labs  Lab 07/22/17  0603   WBC 3.20*   RBC 3.85*   HGB 12.1*   HCT 35.8*   *   MCV 93   MCH 31.4*    MCHC 33.8     CMP:     Recent Labs  Lab 07/22/17  0603   *   CALCIUM 8.8   ALBUMIN 2.5*   PROT 5.9*   *   K 3.8   CO2 24      BUN 13   CREATININE 1.4   ALKPHOS 55   ALT 82*   AST 32   BILITOT 0.7       Significant Diagnostics:  CXR: I have reviewed all pertinent results/findings within the past 24 hours and my personal findings are:     No significant detrimental interval change in the appearance of the chest since 7/18/17 at 7:40 PM is appreciated.  No pneumothorax.    I have reviewed all pertinent imaging results/findings within the past 24 hours.

## 2017-07-22 NOTE — ASSESSMENT & PLAN NOTE
bg goal 140-180- FBG at goal. Continue Levemir 6 units AM, increase to 7 units PM.   Continue BG monitoring q4h with low dose correction scale. Monitor for prandial rises with the cyclic TF.       Discharge planning-  A1c 6.3%; do not recommend DPP4i with GLP1 RA.   Discussed with patient that he may require insulin at discharge- both basal and TF coverage.

## 2017-07-23 LAB
ALBUMIN SERPL BCP-MCNC: 2.5 G/DL
ALP SERPL-CCNC: 53 U/L
ALT SERPL W/O P-5'-P-CCNC: 59 U/L
ANION GAP SERPL CALC-SCNC: 9 MMOL/L
AST SERPL-CCNC: 25 U/L
BASOPHILS # BLD AUTO: 0.02 K/UL
BASOPHILS NFR BLD: 0.6 %
BILIRUB SERPL-MCNC: 0.7 MG/DL
BUN SERPL-MCNC: 15 MG/DL
CALCIUM SERPL-MCNC: 9.1 MG/DL
CHLORIDE SERPL-SCNC: 100 MMOL/L
CO2 SERPL-SCNC: 25 MMOL/L
CREAT SERPL-MCNC: 1.4 MG/DL
CRP SERPL-MCNC: 73.03 MG/L
DIFFERENTIAL METHOD: ABNORMAL
EOSINOPHIL # BLD AUTO: 0.1 K/UL
EOSINOPHIL NFR BLD: 2.9 %
ERYTHROCYTE [DISTWIDTH] IN BLOOD BY AUTOMATED COUNT: 12.3 %
EST. GFR  (AFRICAN AMERICAN): 59.7 ML/MIN/1.73 M^2
EST. GFR  (NON AFRICAN AMERICAN): 51.6 ML/MIN/1.73 M^2
GLUCOSE SERPL-MCNC: 170 MG/DL
HCT VFR BLD AUTO: 36.3 %
HGB BLD-MCNC: 12.5 G/DL
LYMPHOCYTES # BLD AUTO: 0.5 K/UL
LYMPHOCYTES NFR BLD: 16.2 %
MAGNESIUM SERPL-MCNC: 1.8 MG/DL
MCH RBC QN AUTO: 31.3 PG
MCHC RBC AUTO-ENTMCNC: 34.4 G/DL
MCV RBC AUTO: 91 FL
MONOCYTES # BLD AUTO: 0.5 K/UL
MONOCYTES NFR BLD: 14.6 %
NEUTROPHILS # BLD AUTO: 2.1 K/UL
NEUTROPHILS NFR BLD: 65.4 %
PHOSPHATE SERPL-MCNC: 4.1 MG/DL
PLATELET # BLD AUTO: 155 K/UL
PMV BLD AUTO: 8.9 FL
POCT GLUCOSE: 138 MG/DL (ref 70–110)
POCT GLUCOSE: 156 MG/DL (ref 70–110)
POCT GLUCOSE: 156 MG/DL (ref 70–110)
POCT GLUCOSE: 166 MG/DL (ref 70–110)
POCT GLUCOSE: 167 MG/DL (ref 70–110)
POCT GLUCOSE: 175 MG/DL (ref 70–110)
POTASSIUM SERPL-SCNC: 4.5 MMOL/L
PROT SERPL-MCNC: 6 G/DL
RBC # BLD AUTO: 3.99 M/UL
SODIUM SERPL-SCNC: 134 MMOL/L
WBC # BLD AUTO: 3.15 K/UL

## 2017-07-23 PROCEDURE — 63600175 PHARM REV CODE 636 W HCPCS: Performed by: STUDENT IN AN ORGANIZED HEALTH CARE EDUCATION/TRAINING PROGRAM

## 2017-07-23 PROCEDURE — 85025 COMPLETE CBC W/AUTO DIFF WBC: CPT

## 2017-07-23 PROCEDURE — 80053 COMPREHEN METABOLIC PANEL: CPT

## 2017-07-23 PROCEDURE — 97110 THERAPEUTIC EXERCISES: CPT

## 2017-07-23 PROCEDURE — 36415 COLL VENOUS BLD VENIPUNCTURE: CPT

## 2017-07-23 PROCEDURE — 25000003 PHARM REV CODE 250: Performed by: NURSE PRACTITIONER

## 2017-07-23 PROCEDURE — 83735 ASSAY OF MAGNESIUM: CPT

## 2017-07-23 PROCEDURE — 25000003 PHARM REV CODE 250: Performed by: STUDENT IN AN ORGANIZED HEALTH CARE EDUCATION/TRAINING PROGRAM

## 2017-07-23 PROCEDURE — 86141 C-REACTIVE PROTEIN HS: CPT

## 2017-07-23 PROCEDURE — 84100 ASSAY OF PHOSPHORUS: CPT

## 2017-07-23 PROCEDURE — 97116 GAIT TRAINING THERAPY: CPT

## 2017-07-23 PROCEDURE — 63600175 PHARM REV CODE 636 W HCPCS: Performed by: SURGERY

## 2017-07-23 PROCEDURE — 25000003 PHARM REV CODE 250: Performed by: SURGERY

## 2017-07-23 PROCEDURE — 20600001 HC STEP DOWN PRIVATE ROOM

## 2017-07-23 RX ADMIN — AMLODIPINE BESYLATE 5 MG: 10 TABLET ORAL at 01:07

## 2017-07-23 RX ADMIN — FAMOTIDINE 20 MG: 10 INJECTION, SOLUTION INTRAVENOUS at 09:07

## 2017-07-23 RX ADMIN — TAMSULOSIN HYDROCHLORIDE 0.4 MG: 0.4 CAPSULE ORAL at 09:07

## 2017-07-23 RX ADMIN — ENOXAPARIN SODIUM 40 MG: 100 INJECTION SUBCUTANEOUS at 01:07

## 2017-07-23 RX ADMIN — Medication 50 MG: at 09:07

## 2017-07-23 RX ADMIN — Medication 50 MG: at 08:07

## 2017-07-23 RX ADMIN — ASPIRIN 150 MG: 300 SUPPOSITORY RECTAL at 09:07

## 2017-07-23 RX ADMIN — INSULIN DETEMIR 7 UNITS: 100 INJECTION, SOLUTION SUBCUTANEOUS at 08:07

## 2017-07-23 RX ADMIN — MAGNESIUM SULFATE HEPTAHYDRATE 1 G: 500 INJECTION, SOLUTION INTRAMUSCULAR; INTRAVENOUS at 09:07

## 2017-07-23 NOTE — ASSESSMENT & PLAN NOTE
POD 5 s/p Robotic Esophagectomy. On Pathway. Doing well overall.     - Tube feeds tonight at 20cc/hr, increase by 10cc/night for a goal of 80cc/hr 2pm-8am with water boluses  - Cont Hycet   - NPO except sips with pills, no pills down jtube  - PRN lytes

## 2017-07-23 NOTE — PLAN OF CARE
Problem: Patient Care Overview  Goal: Plan of Care Review  Outcome: Ongoing (interventions implemented as appropriate)  Plan of care reviewed with pt. Pt verbalized understanding. AAOx4. VSS on room air. Pain managed with prn pain medicaiton. NPO. Denied n/v. LISA intact. Tube feedings 8pm-8am, tolerating well. Remained free from falls or injuries. Call light within reach. Will call for assistance. No distress noted. Will continue to monitor.

## 2017-07-23 NOTE — SUBJECTIVE & OBJECTIVE
Interval History: POD 5 s/p robotic esophagectomy. Pt doing well, NAEON, adequate pain control. On pathway.       Medications:  Continuous Infusions:     Scheduled Meds:   amlodipine  5 mg Per J Tube Daily    aspirin  150 mg Rectal Daily    enoxparin  40 mg Subcutaneous Q24H    famotidine (PF)  20 mg Intravenous Daily    insulin detemir  6 Units Subcutaneous Daily    insulin detemir  7 Units Subcutaneous QHS    magnesium sulfate IVPB  1 g Intravenous Once    metoprolol  50 mg Per J Tube BID    tamsulosin  0.4 mg Oral Daily     PRN Meds:dextrose 50%, glucagon (human recombinant), hydrocodone-apap 7.5-325 MG/15 ML, hydrocodone-apap 7.5-325 MG/15 ML, HYDROmorphone, insulin aspart     Review of patient's allergies indicates:   Allergen Reactions    Ciprofloxacin Hives and Swelling     Objective:     Vital Signs (Most Recent):  Temp: 96.5 °F (35.8 °C) (07/23/17 0716)  Pulse: 78 (07/23/17 0716)  Resp: 17 (07/23/17 0716)  BP: 131/76 (07/23/17 0716)  SpO2: 96 % (07/23/17 0716) Vital Signs (24h Range):  Temp:  [96.5 °F (35.8 °C)-99.2 °F (37.3 °C)] 96.5 °F (35.8 °C)  Pulse:  [72-94] 78  Resp:  [16-18] 17  SpO2:  [92 %-98 %] 96 %  BP: (116-171)/(72-80) 131/76     Weight: 109.3 kg (241 lb)  Body mass index is 32.69 kg/m².    Intake/Output - Last 3 Shifts       07/21 0700 - 07/22 0659 07/22 0700 - 07/23 0659 07/23 0700 - 07/24 0659    P.O. 0 0     I.V. (mL/kg)       NG/ 1107     Total Intake(mL/kg) 520 (4.8) 1107 (10.1)     Urine (mL/kg/hr) 1100 (0.4) 1500 (0.6)     Drains 15 (0) 7.5 (0)     Chest Tube 190 (0.1) 50 (0)     Total Output 1305 1557.5      Net -785 -450.5                   Physical Exam    A&O, NAD  RRR  Lungs CTAB  Drains with SS output, scant  ABD: S/ND/ Nancy. TTP           Significant Labs:  CBC:     Recent Labs  Lab 07/23/17  0511   WBC 3.15*   RBC 3.99*   HGB 12.5*   HCT 36.3*      MCV 91   MCH 31.3*   MCHC 34.4     CMP:     Recent Labs  Lab 07/23/17  0511   *   CALCIUM 9.1    ALBUMIN 2.5*   PROT 6.0   *   K 4.5   CO2 25      BUN 15   CREATININE 1.4   ALKPHOS 53*   ALT 59*   AST 25   BILITOT 0.7

## 2017-07-23 NOTE — PROGRESS NOTES
[facility]  General Surgery  Progress Note    Subjective:     History of Present Illness:  No notes on file    Post-Op Info:  Procedure(s) (LRB):  ESOPHAGECTOMY-ROBOTIC (N/A)  PLACEMENT-TUBE-JEJUNOSTOMY (N/A)   5 Days Post-Op     Interval History: POD 5 s/p robotic esophagectomy. Pt doing well, NAEON, adequate pain control. On pathway.       Medications:  Continuous Infusions:     Scheduled Meds:   amlodipine  5 mg Per J Tube Daily    aspirin  150 mg Rectal Daily    enoxparin  40 mg Subcutaneous Q24H    famotidine (PF)  20 mg Intravenous Daily    insulin detemir  6 Units Subcutaneous Daily    insulin detemir  7 Units Subcutaneous QHS    magnesium sulfate IVPB  1 g Intravenous Once    metoprolol  50 mg Per J Tube BID    tamsulosin  0.4 mg Oral Daily     PRN Meds:dextrose 50%, glucagon (human recombinant), hydrocodone-apap 7.5-325 MG/15 ML, hydrocodone-apap 7.5-325 MG/15 ML, HYDROmorphone, insulin aspart     Review of patient's allergies indicates:   Allergen Reactions    Ciprofloxacin Hives and Swelling     Objective:     Vital Signs (Most Recent):  Temp: 96.5 °F (35.8 °C) (07/23/17 0716)  Pulse: 78 (07/23/17 0716)  Resp: 17 (07/23/17 0716)  BP: 131/76 (07/23/17 0716)  SpO2: 96 % (07/23/17 0716) Vital Signs (24h Range):  Temp:  [96.5 °F (35.8 °C)-99.2 °F (37.3 °C)] 96.5 °F (35.8 °C)  Pulse:  [72-94] 78  Resp:  [16-18] 17  SpO2:  [92 %-98 %] 96 %  BP: (116-171)/(72-80) 131/76     Weight: 109.3 kg (241 lb)  Body mass index is 32.69 kg/m².    Intake/Output - Last 3 Shifts       07/21 0700 - 07/22 0659 07/22 0700 - 07/23 0659 07/23 0700 - 07/24 0659    P.O. 0 0     I.V. (mL/kg)       NG/ 1107     Total Intake(mL/kg) 520 (4.8) 1107 (10.1)     Urine (mL/kg/hr) 1100 (0.4) 1500 (0.6)     Drains 15 (0) 7.5 (0)     Chest Tube 190 (0.1) 50 (0)     Total Output 1305 1557.5      Net -785 -450.5                   Physical Exam    A&O, NAD  RRR  Lungs CTAB  Drains with SS output, scant  ABD:  S/ND/ Nancy. TTP           Significant Labs:  CBC:     Recent Labs  Lab 07/23/17  0511   WBC 3.15*   RBC 3.99*   HGB 12.5*   HCT 36.3*      MCV 91   MCH 31.3*   MCHC 34.4     CMP:     Recent Labs  Lab 07/23/17  0511   *   CALCIUM 9.1   ALBUMIN 2.5*   PROT 6.0   *   K 4.5   CO2 25      BUN 15   CREATININE 1.4   ALKPHOS 53*   ALT 59*   AST 25   BILITOT 0.7           Assessment/Plan:     Type 2 diabetes mellitus with polyneuropathy    Endocrine consult    -monitoring         CKD (chronic kidney disease), stage III    Cr stable        Benign essential HTN    Home meds        * Malignant neoplasm of lower third of esophagus    POD 5 s/p Robotic Esophagectomy. On Pathway. Doing well overall.     - Tube feeds tonight at 20cc/hr, increase by 10cc/night for a goal of 80cc/hr 2pm-8am with water boluses  - Cont Hycet   - NPO except sips with pills, no pills down jtube  - PRN meghna Franklin MD  General Surgery  [facility]

## 2017-07-23 NOTE — PLAN OF CARE
Problem: Diabetes, Type 2 (Adult)  Goal: Signs and Symptoms of Listed Potential Problems Will be Absent, Minimized or Managed (Diabetes, Type 2)  Signs and symptoms of listed potential problems will be absent, minimized or managed by discharge/transition of care (reference Diabetes, Type 2 (Adult) CPG).   Prn insulin not required during this shift.     Problem: Fall Risk (Adult)  Goal: Identify Related Risk Factors and Signs and Symptoms  Related risk factors and signs and symptoms are identified upon initiation of Human Response Clinical Practice Guideline (CPG)   No falls during this shift.     Problem: Pressure Ulcer Risk (Javi Scale) (Adult,Obstetrics,Pediatric)  Goal: Identify Related Risk Factors and Signs and Symptoms  Related risk factors and signs and symptoms are identified upon initiation of Human Response Clinical Practice Guideline (CPG)   Pt repositions self in bed independently.

## 2017-07-23 NOTE — PLAN OF CARE
Problem: Physical Therapy Goal  Goal: Physical Therapy Goal  PT goals until 7/25/17    1. Pt supine to sit with supervision-not met  2. Pt sit to supine with supervision-not met  3. Pt sit to stand with no AD with supervision-not met  4. Pt to perform gait 200ft with no AD with supervision.-not met  5. Pt to go up/down curb step with no AD with CGA.-not met  6. Pt to perform B LE exs in sitting x 15 reps with handout.-not met     Pt progressing towards goals. continue with PT POC.Goals remain appropriate.    George Yancey PTA  7/23/2017

## 2017-07-23 NOTE — PT/OT/SLP PROGRESS
Physical Therapy  Treatment    Mikie Walter Jr   MRN: [id number]   Admitting Diagnosis: Malignant neoplasm of lower third of esophagus    PT Received On: 07/23/17  PT Start Time: 0740     PT Stop Time: 0803    PT Total Time (min): 23 min       Billable Minutes:  Gait [id number] and Therapeutic Exercise 10    Treatment Type: Treatment  PT/PTA: PTA     PTA Visit Number: 2       General Precautions: Standard, fall  Orthopedic Precautions: N/A   Braces:      Do you have any cultural, spiritual, Jainism conflicts, given your current situation?: none    Subjective:  Communicated with RN prior to session.  I am doing better now    Pain/Comfort  Pain Rating 1: 0/10  Pain Rating Post-Intervention 1: 0/10    Objective:        Functional Mobility:  Bed Mobility:   Supine to Sit: Stand by Assistance    Transfers:  Sit <> Stand Assistance: Stand By Assistance  Sit <> Stand Assistive Device: No Assistive Device    Gait:   Gait Distance: 300 ft with no LOB  Assistance 1: Stand by Assistance  Gait Assistive Device: No device  Gait Pattern: swing-through gait  Gait Deviation(s): decreased mary, decreased step length, decreased stride length, decreased toe-to-floor clearance    Therapeutic Activities and Exercises:  Discussed/educated patient on progress, safety, importance of OOB mobility for improving outcomes after surgery and d/c, PT POC   Patient performed therex X 10 reps standing B LE AROM  Hip Flexion, Hip Abd/Add .heel raises  White board updated   Pt encouraged to ambulate in hallways 3x/day with nursing or family assistance to improve endurance.   Pt safe to ambulate in hallway with RN or PCT assistance   Pt performed standing balance activity  including side  Stepping, backwards walkingwith CGA for balance.      AM-PAC 6 CLICK MOBILITY  How much help from another person does this patient currently need?   1 = Unable, Total/Dependent Assistance  2 = A lot, Maximum/Moderate Assistance  3 = A little, Minimum/Contact  Guard/Supervision  4 = None, Modified [place]/Independent    Turning over in bed (including adjusting bedclothes, sheets and blankets)?: 3  Sitting down on and standing up from a chair with arms (e.g., wheelchair, bedside commode, etc.): 3  Moving from lying on back to sitting on the side of the bed?: 3  Moving to and from a bed to a chair (including a wheelchair)?: 3  Need to walk in hospital room?: 3  Climbing 3-5 steps with a railing?: 2  Total Score: 17    AM-PAC Raw Score CMS G-Code Modifier Level of Impairment Assistance   6 % Total / Unable   7 - 9 CM 80 - 100% Maximal Assist   10 - 14 CL 60 - 80% Moderate Assist   15 - 19 CK 40 - 60% Moderate Assist   20 - 22 CJ 20 - 40% Minimal Assist   23 CI 1-20% SBA / CGA   24 CH 0% Independent/ Mod I     Patient left up in chair with all lines intact, call button in reach, nsg notified and spouse present.    Assessment:  Mikie Walter Jr is a 66 y.o. male with a medical diagnosis of Malignant neoplasm of lower third of esophagus and presents with continued deficits as listed below.Pt Progressing with PT Intervention. Pt Progressing with improving gait distance. Pt would continue to benefit from skilled PT to address overall functional mobility and goals. Goals remain appropriate      Rehab identified problem list/impairments: Rehab identified problem list/impairments: impaired endurance, gait instability    Rehab potential is good.    Activity tolerance: Good    Discharge recommendations: Discharge Facility/Level Of Care Needs: home     Barriers to discharge: Barriers to Discharge: None    Equipment recommendations: Equipment Needed After Discharge: none     GOALS:    Physical Therapy Goals        Problem: Physical Therapy Goal    Goal Priority Disciplines Outcome Goal Variances Interventions   Physical Therapy Goal     PT/OT, PT Ongoing (interventions implemented as appropriate)     Description:  PT goals until 7/25/17    1. Pt supine to sit with  supervision-not met  2. Pt sit to supine with supervision-not met  3. Pt sit to stand with no AD with supervision-not met  4. Pt to perform gait 200ft with no AD with supervision.-not met  5. Pt to go up/down curb step with no AD with CGA.-not met  6. Pt to perform B LE exs in sitting x 15 reps with handout.-not met                      PLAN:    Patient to be seen 5 x/week  to address the above listed problems via gait training, therapeutic activities, therapeutic exercises  Plan of Care expires: 08/18/17  Plan of Care reviewed with: patient         George Yancey, PTA  07/23/2017

## 2017-07-24 ENCOUNTER — TELEPHONE (OUTPATIENT)
Dept: SURGERY | Facility: CLINIC | Age: 67
End: 2017-07-24

## 2017-07-24 VITALS
WEIGHT: 241 LBS | TEMPERATURE: 99 F | HEIGHT: 72 IN | SYSTOLIC BLOOD PRESSURE: 132 MMHG | OXYGEN SATURATION: 9 % | RESPIRATION RATE: 16 BRPM | BODY MASS INDEX: 32.64 KG/M2 | HEART RATE: 76 BPM | DIASTOLIC BLOOD PRESSURE: 85 MMHG

## 2017-07-24 DIAGNOSIS — C15.5 MALIGNANT NEOPLASM OF LOWER THIRD OF ESOPHAGUS: Primary | ICD-10-CM

## 2017-07-24 PROBLEM — N18.30 CKD (CHRONIC KIDNEY DISEASE), STAGE III: Status: ACTIVE | Noted: 2017-07-24

## 2017-07-24 LAB
ALBUMIN SERPL BCP-MCNC: 2.7 G/DL
ALP SERPL-CCNC: 63 U/L
ALT SERPL W/O P-5'-P-CCNC: 49 U/L
ANION GAP SERPL CALC-SCNC: 12 MMOL/L
AST SERPL-CCNC: 25 U/L
BASOPHILS # BLD AUTO: 0.02 K/UL
BASOPHILS NFR BLD: 0.5 %
BILIRUB SERPL-MCNC: 0.7 MG/DL
BUN SERPL-MCNC: 16 MG/DL
CALCIUM SERPL-MCNC: 9.1 MG/DL
CHLORIDE SERPL-SCNC: 101 MMOL/L
CO2 SERPL-SCNC: 22 MMOL/L
CREAT SERPL-MCNC: 1.3 MG/DL
CRP SERPL-MCNC: 67.83 MG/L
DIFFERENTIAL METHOD: ABNORMAL
EOSINOPHIL # BLD AUTO: 0.1 K/UL
EOSINOPHIL NFR BLD: 3.2 %
ERYTHROCYTE [DISTWIDTH] IN BLOOD BY AUTOMATED COUNT: 12.4 %
EST. GFR  (AFRICAN AMERICAN): >60 ML/MIN/1.73 M^2
EST. GFR  (NON AFRICAN AMERICAN): 56.5 ML/MIN/1.73 M^2
GLUCOSE SERPL-MCNC: 180 MG/DL
HCT VFR BLD AUTO: 37 %
HGB BLD-MCNC: 12.8 G/DL
LYMPHOCYTES # BLD AUTO: 0.5 K/UL
LYMPHOCYTES NFR BLD: 12.2 %
MAGNESIUM SERPL-MCNC: 1.9 MG/DL
MCH RBC QN AUTO: 31.2 PG
MCHC RBC AUTO-ENTMCNC: 34.6 G/DL
MCV RBC AUTO: 90 FL
MONOCYTES # BLD AUTO: 0.6 K/UL
MONOCYTES NFR BLD: 14.9 %
NEUTROPHILS # BLD AUTO: 2.8 K/UL
NEUTROPHILS NFR BLD: 69 %
PHOSPHATE SERPL-MCNC: 4.1 MG/DL
PLATELET # BLD AUTO: 160 K/UL
PMV BLD AUTO: 9.3 FL
POCT GLUCOSE: 175 MG/DL (ref 70–110)
POCT GLUCOSE: 193 MG/DL (ref 70–110)
POTASSIUM SERPL-SCNC: 4.3 MMOL/L
PROT SERPL-MCNC: 6.3 G/DL
RBC # BLD AUTO: 4.1 M/UL
SODIUM SERPL-SCNC: 135 MMOL/L
WBC # BLD AUTO: 4.09 K/UL

## 2017-07-24 PROCEDURE — 84100 ASSAY OF PHOSPHORUS: CPT

## 2017-07-24 PROCEDURE — 83735 ASSAY OF MAGNESIUM: CPT

## 2017-07-24 PROCEDURE — 25000003 PHARM REV CODE 250: Performed by: NURSE PRACTITIONER

## 2017-07-24 PROCEDURE — 25000003 PHARM REV CODE 250: Performed by: SURGERY

## 2017-07-24 PROCEDURE — 80053 COMPREHEN METABOLIC PANEL: CPT

## 2017-07-24 PROCEDURE — 99232 SBSQ HOSP IP/OBS MODERATE 35: CPT | Mod: ,,, | Performed by: NURSE PRACTITIONER

## 2017-07-24 PROCEDURE — 85025 COMPLETE CBC W/AUTO DIFF WBC: CPT

## 2017-07-24 PROCEDURE — 86141 C-REACTIVE PROTEIN HS: CPT

## 2017-07-24 PROCEDURE — 36415 COLL VENOUS BLD VENIPUNCTURE: CPT

## 2017-07-24 RX ORDER — HYDROCODONE BITARTRATE AND ACETAMINOPHEN 7.5; 325 MG/15ML; MG/15ML
15 SOLUTION ORAL EVERY 4 HOURS PRN
Qty: 473 ML | Refills: 0 | Status: SHIPPED | OUTPATIENT
Start: 2017-07-24 | End: 2017-07-24

## 2017-07-24 RX ORDER — HYDROCODONE BITARTRATE AND ACETAMINOPHEN 7.5; 325 MG/15ML; MG/15ML
15 SOLUTION ORAL EVERY 4 HOURS PRN
Qty: 473 ML | Refills: 0 | Status: SHIPPED | OUTPATIENT
Start: 2017-07-24 | End: 2017-09-11

## 2017-07-24 RX ORDER — LISINOPRIL AND HYDROCHLOROTHIAZIDE 12.5; 2 MG/1; MG/1
1 TABLET ORAL DAILY
Qty: 1 TABLET | Refills: 0 | OUTPATIENT
Start: 2017-07-24 | End: 2019-03-14

## 2017-07-24 RX ORDER — SIMVASTATIN 20 MG/1
20 TABLET, FILM COATED ORAL NIGHTLY
Qty: 90 TABLET | Refills: 3 | Status: SHIPPED | OUTPATIENT
Start: 2017-07-24 | End: 2023-02-23

## 2017-07-24 RX ORDER — PANTOPRAZOLE SODIUM 40 MG/1
40 TABLET, DELAYED RELEASE ORAL DAILY
Qty: 1 TABLET | Refills: 0 | OUTPATIENT
Start: 2017-07-24 | End: 2018-08-18 | Stop reason: SDUPTHER

## 2017-07-24 RX ORDER — PRASUGREL 10 MG/1
10 TABLET, FILM COATED ORAL DAILY
Qty: 1 TABLET | Refills: 0 | Status: ON HOLD | OUTPATIENT
Start: 2017-07-24 | End: 2023-02-24 | Stop reason: HOSPADM

## 2017-07-24 RX ORDER — AMLODIPINE BESYLATE 5 MG/1
5 TABLET ORAL DAILY
Qty: 30 TABLET | Refills: 11 | Status: SHIPPED | OUTPATIENT
Start: 2017-07-24 | End: 2023-02-23

## 2017-07-24 RX ORDER — ASPIRIN 81 MG/1
81 TABLET ORAL DAILY
Qty: 1 TABLET | Refills: 0 | OUTPATIENT
Start: 2017-07-24 | End: 2019-03-14

## 2017-07-24 RX ADMIN — ASPIRIN 150 MG: 300 SUPPOSITORY RECTAL at 08:07

## 2017-07-24 RX ADMIN — TAMSULOSIN HYDROCHLORIDE 0.4 MG: 0.4 CAPSULE ORAL at 08:07

## 2017-07-24 RX ADMIN — Medication 50 MG: at 08:07

## 2017-07-24 RX ADMIN — FAMOTIDINE 20 MG: 10 INJECTION, SOLUTION INTRAVENOUS at 08:07

## 2017-07-24 NOTE — PROGRESS NOTES
[facility]  General Surgery  Progress Note    Subjective:       Post-Op Info:  Procedure(s) (LRB):  ESOPHAGECTOMY-ROBOTIC (N/A)  PLACEMENT-TUBE-JEJUNOSTOMY (N/A)   6 Days Post-Op     Interval History: NAEON.  VSSAF.  Pain controlled.  Tolerating tube feeds.  BM x1.    Medications:  Continuous Infusions:   Scheduled Meds:   amlodipine  5 mg Per J Tube Daily    aspirin  150 mg Rectal Daily    enoxparin  40 mg Subcutaneous Q24H    famotidine (PF)  20 mg Intravenous Daily    insulin detemir  6 Units Subcutaneous Daily    insulin detemir  7 Units Subcutaneous QHS    metoprolol  50 mg Per J Tube BID    tamsulosin  0.4 mg Oral Daily     PRN Meds:dextrose 50%, glucagon (human recombinant), hydrocodone-apap 7.5-325 MG/15 ML, hydrocodone-apap 7.5-325 MG/15 ML, HYDROmorphone, insulin aspart     Review of patient's allergies indicates:   Allergen Reactions    Ciprofloxacin Hives and Swelling     Objective:     Vital Signs (Most Recent):  Temp: 98 °F (36.7 °C) (07/24/17 0428)  Pulse: 84 (07/24/17 0428)  Resp: 16 (07/24/17 0428)  BP: (!) 141/86 (07/24/17 0428)  SpO2: (!) 93 % (07/24/17 0428) Vital Signs (24h Range):  Temp:  [97.6 °F (36.4 °C)-98.6 °F (37 °C)] 98 °F (36.7 °C)  Pulse:  [67-84] 84  Resp:  [16-18] 16  SpO2:  [93 %-98 %] 93 %  BP: (117-154)/(80-88) 141/86     Weight: 109.3 kg (241 lb)  Body mass index is 32.69 kg/m².    Intake/Output - Last 3 Shifts       07/22 0700 - 07/23 0659 07/23 0700 - 07/24 0659 07/24 0700 - 07/25 0659    P.O. 0 75     I.V. (mL/kg)  50 (0.5)     NG/GT 1107 1486     Total Intake(mL/kg) 1107 (10.1) 1611 (14.7)     Urine (mL/kg/hr) 1500 (0.6) 900 (0.3)     Emesis/NG output  0 (0)     Drains 7.5 (0) 2.5 (0)     Other  0 (0)     Stool  0 (0)     Chest Tube 50 (0)      Total Output 1557.5 902.5      Net -450.5 +708.5             Stool Occurrence  1 x     Emesis Occurrence  0 x           Physical Exam  NAD, AAOx3  RRR  CTAB  Abd S/ND/ATTP  Minimal serosanguinous drain  output    Significant Labs:  CBC:   Recent Labs  Lab 07/24/17  0437   WBC 4.09   RBC 4.10*   HGB 12.8*   HCT 37.0*      MCV 90   MCH 31.2*   MCHC 34.6     CMP:   Recent Labs  Lab 07/24/17  0437   *   CALCIUM 9.1   ALBUMIN 2.7*   PROT 6.3   *   K 4.3   CO2 22*      BUN 16   CREATININE 1.3   ALKPHOS 63   ALT 49*   AST 25   BILITOT 0.7     CRP 69    Assessment/Plan:     Type 2 diabetes mellitus with polyneuropathy    Endocrine consult    -monitoring         CKD (chronic kidney disease), stage III    Cr stable        Benign essential HTN    Home meds        * Malignant neoplasm of lower third of esophagus    POD 6 s/p Robotic Esophagectomy. On Pathway. Doing well overall.     - Tube feeds tonight at 30cc/hr, increase by 10cc/night for a goal of 80cc/hr 2pm-8am with water boluses  - Cont Hycet   - NPO except sips with pills, no pills down jtube  - PRN lytes  - D/c home              William Rodriguez MD  General Surgery  [facility]

## 2017-07-24 NOTE — SUBJECTIVE & OBJECTIVE
Interval HPI:   No acute events overnight. Most BG at/near goal with no hypoglycemia. No c/o nausea. Afebrile. Pending discharge home today on TF (Isosource 1.5 from 2P-8A) goal rate of 80 ml/hr (increasing by 10 ml/hr each night).     /85 (BP Location: Right arm, Patient Position: Lying, BP Method: Automatic)   Pulse 76   Temp 98.8 °F (37.1 °C)   Resp 16   Ht 6' (1.829 m)   Wt 109.3 kg (241 lb)   SpO2 (!) 9%   BMI 32.69 kg/m²     Labs Reviewed and Include      Recent Labs  Lab 07/24/17  0437   *   CALCIUM 9.1   ALBUMIN 2.7*   PROT 6.3   *   K 4.3   CO2 22*      BUN 16   CREATININE 1.3   ALKPHOS 63   ALT 49*   AST 25   BILITOT 0.7     Lab Results   Component Value Date    WBC 4.09 07/24/2017    HGB 12.8 (L) 07/24/2017    HCT 37.0 (L) 07/24/2017    MCV 90 07/24/2017     07/24/2017     No results for input(s): TSH, FREET4 in the last 168 hours.  Lab Results   Component Value Date    HGBA1C 6.3 (H) 07/18/2017       Nutritional status:   Body mass index is 32.69 kg/m².  Lab Results   Component Value Date    ALBUMIN 2.7 (L) 07/24/2017    ALBUMIN 2.5 (L) 07/23/2017    ALBUMIN 2.5 (L) 07/22/2017     Lab Results   Component Value Date    PREALBUMIN 34 07/10/2017       Estimated Creatinine Clearance: 70.4 mL/min (based on Cr of 1.3).    Accu-Checks  Recent Labs      07/22/17   1820  07/22/17   2031  07/22/17   2356  07/23/17   0351  07/23/17   0958  07/23/17   1302  07/23/17   1554  07/23/17   2021  07/23/17   2340  07/24/17   0434   POCTGLUCOSE  145*  151*  132*  167*  166*  156*  138*  156*  175*  193*       Current Medications and/or Treatments Impacting Glycemic Control  Immunotherapy:  Immunosuppressants     None        Steroids:   Hormones     None        Pressors:    Autonomic Drugs     None        Hyperglycemia/Diabetes Medications: Antihyperglycemics     Start     Stop Route Frequency Ordered    07/23/17 0900  insulin detemir pen 6 Units      -- SubQ Daily 07/22/17 1049     07/22/17 2100  insulin detemir pen 7 Units      -- SubQ Nightly 07/22/17 1048    07/21/17 1400  insulin aspart pen 0-5 Units      -- SubQ Every 4 hours PRN 07/21/17 1257

## 2017-07-24 NOTE — ASSESSMENT & PLAN NOTE
POD 6 s/p Robotic Esophagectomy. On Pathway. Doing well overall.     - Tube feeds tonight at 30cc/hr, increase by 10cc/night for a goal of 80cc/hr 2pm-8am with water boluses  - Cont Hycet   - NPO except sips with pills, no pills down jtube  - PRN lytes  - D/c home

## 2017-07-24 NOTE — PLAN OF CARE
Problem: Patient Care Overview  Goal: Plan of Care Review  Outcome: Ongoing (interventions implemented as appropriate)  Plan of care reviewed with pt. Pt verbalized understanding. AAOx4. VSS on room air. Pain managed with prn pain medicaiton. NPO. Denied n/v. LISA intact. Tube feedings pm-8am @30ml, tolerating well. BM x1.  Remained free from falls or injuries. Call light within reach. Will call for assistance. No distress noted. Will continue to monitor.

## 2017-07-24 NOTE — PROGRESS NOTES
[facility]  Endocrinology  Progress Note    Admit Date: 7/18/2017     Reason for Consult: Management of T2DM, Hyperglycemia     Surgical Procedure and Date: 7/18/17    Diabetes diagnosis year: . 20 years    Home Diabetes Medications:  Kombiglyze and Victoza stopped 1 week prior to admission    How often checking glucose at home? One   BG readings on regimen: 170-190s  Hypoglycemia on the regimen?  No  Missed doses on regimen?  No    Diabetes Complications include:   Hyperglycemia and peripheral neuropathy    Complicating diabetes co morbidities: Active Cancer    HPI:  Mr. Walter is a 66 y.o. male with a history of T2DM, HTN, CAD s/p CABG x 3 in 2011 with subsequent stent placement for bypass failure, kidney cancer s/p left nephrectomy in 11/2016, chronic GERD, and esophageal adenocarcinoma, diagnosed in 10/2016. He is s/p neoadjuvant chemoradiation, last dose of radiation on 4/25/17. He first presented back in 10/2016 with c/o 2 months of progressive dysphagia. EGD/EUS at that time revealed a tumor at the esophagogastric junction, staged T2 by ultrasound. He is now s/p esophagectomy. Endocrine consulted for bg management.         Interval HPI:   No acute events overnight. Most BG at/near goal with no hypoglycemia. No c/o nausea. Afebrile. Pending discharge home today on TF (Isosource 1.5 from 2P-8A) goal rate of 80 ml/hr (increasing by 10 ml/hr each night).     /85 (BP Location: Right arm, Patient Position: Lying, BP Method: Automatic)   Pulse 76   Temp 98.8 °F (37.1 °C)   Resp 16   Ht 6' (1.829 m)   Wt 109.3 kg (241 lb)   SpO2 (!) 9%   BMI 32.69 kg/m²      Labs Reviewed and Include      Recent Labs  Lab 07/24/17  0437   *   CALCIUM 9.1   ALBUMIN 2.7*   PROT 6.3   *   K 4.3   CO2 22*      BUN 16   CREATININE 1.3   ALKPHOS 63   ALT 49*   AST 25   BILITOT 0.7     Lab Results   Component Value Date    WBC 4.09 07/24/2017    HGB 12.8 (L) 07/24/2017    HCT 37.0 (L)  07/24/2017    MCV 90 07/24/2017     07/24/2017     No results for input(s): TSH, FREET4 in the last 168 hours.  Lab Results   Component Value Date    HGBA1C 6.3 (H) 07/18/2017       Nutritional status:   Body mass index is 32.69 kg/m².  Lab Results   Component Value Date    ALBUMIN 2.7 (L) 07/24/2017    ALBUMIN 2.5 (L) 07/23/2017    ALBUMIN 2.5 (L) 07/22/2017     Lab Results   Component Value Date    PREALBUMIN 34 07/10/2017       Estimated Creatinine Clearance: 70.4 mL/min (based on Cr of 1.3).    Accu-Checks  Recent Labs      07/22/17   1820  07/22/17   2031  07/22/17   2356  07/23/17   0351  07/23/17   0958  07/23/17   1302  07/23/17   1554  07/23/17   2021  07/23/17   2340  07/24/17   0434   POCTGLUCOSE  145*  151*  132*  167*  166*  156*  138*  156*  175*  193*       Current Medications and/or Treatments Impacting Glycemic Control  Immunotherapy:  Immunosuppressants     None        Steroids:   Hormones     None        Pressors:    Autonomic Drugs     None        Hyperglycemia/Diabetes Medications: Antihyperglycemics     Start     Stop Route Frequency Ordered    07/23/17 0900  insulin detemir pen 6 Units      -- SubQ Daily 07/22/17 1047    07/22/17 2100  insulin detemir pen 7 Units      -- SubQ Nightly 07/22/17 1048    07/21/17 1400  insulin aspart pen 0-5 Units      -- SubQ Every 4 hours PRN 07/21/17 1253          ASSESSMENT and PLAN    Type 2 diabetes mellitus with polyneuropathy    BG goal 140-180- FBG at goal. Continue Levemir 6 units AM, 7 units PM.   Continue BG monitoring q4h with low dose correction scale. Monitor for prandial rises with the cyclic TF.       Discharge planning-  A1c 6.3%; do not recommend DPP4i with GLP1 RA.   Home on Levemir 7 units bid, resume Victoza 0.6 mg daily (can help with prandial rise)  Avoid Kombiglyze/orals (difficulty swallowing)  Send logs to review in 1 week to AGAPITO Mtz DNP          * Malignant neoplasm of lower third of esophagus    S/p esophagectomy 7/18/17; now  NPO with TF          H/O esophagectomy    Per surgery          Coronary artery disease involving native coronary artery of native heart without angina pectoris    avoid hypoglycemia            CKD (chronic kidney disease), stage III    H/o nephrectomy r/t cancer  Estimated Creatinine Clearance: 70.4 mL/min (based on Cr of 1.3).  Avoid hypoglycemia           On enteral nutrition    On Isosource 1.5 at 40 ml/hr, goal rate 80 ml/hr              Juan Mtz DNP, NP  Endocrinology  [facility]

## 2017-07-24 NOTE — ASSESSMENT & PLAN NOTE
BG goal 140-180- FBG at goal. Continue Levemir 6 units AM, 7 units PM.   Continue BG monitoring q4h with low dose correction scale. Monitor for prandial rises with the cyclic TF.       Discharge planning-  A1c 6.3%; do not recommend DPP4i with GLP1 RA.   Home on Levemir 7 units bid, resume Victoza 0.6 mg daily (can help with prandial rise)  Avoid Kombiglyze/orals (difficulty swallowing)  Send logs to review in 1 week to AGAPITO Mtz DNP

## 2017-07-24 NOTE — NURSING
Discharge instructions given to pt. No further questions at this time. Pt awaiting randy drain removal.

## 2017-07-24 NOTE — SUBJECTIVE & OBJECTIVE
Interval History: NAEON.  VSSAF.  Pain controlled.  Tolerating tube feeds.  BM x1.    Medications:  Continuous Infusions:   Scheduled Meds:   amlodipine  5 mg Per J Tube Daily    aspirin  150 mg Rectal Daily    enoxparin  40 mg Subcutaneous Q24H    famotidine (PF)  20 mg Intravenous Daily    insulin detemir  6 Units Subcutaneous Daily    insulin detemir  7 Units Subcutaneous QHS    metoprolol  50 mg Per J Tube BID    tamsulosin  0.4 mg Oral Daily     PRN Meds:dextrose 50%, glucagon (human recombinant), hydrocodone-apap 7.5-325 MG/15 ML, hydrocodone-apap 7.5-325 MG/15 ML, HYDROmorphone, insulin aspart     Review of patient's allergies indicates:   Allergen Reactions    Ciprofloxacin Hives and Swelling     Objective:     Vital Signs (Most Recent):  Temp: 98 °F (36.7 °C) (07/24/17 0428)  Pulse: 84 (07/24/17 0428)  Resp: 16 (07/24/17 0428)  BP: (!) 141/86 (07/24/17 0428)  SpO2: (!) 93 % (07/24/17 0428) Vital Signs (24h Range):  Temp:  [97.6 °F (36.4 °C)-98.6 °F (37 °C)] 98 °F (36.7 °C)  Pulse:  [67-84] 84  Resp:  [16-18] 16  SpO2:  [93 %-98 %] 93 %  BP: (117-154)/(80-88) 141/86     Weight: 109.3 kg (241 lb)  Body mass index is 32.69 kg/m².    Intake/Output - Last 3 Shifts       07/22 0700 - 07/23 0659 07/23 0700 - 07/24 0659 07/24 0700 - 07/25 0659    P.O. 0 75     I.V. (mL/kg)  50 (0.5)     NG/GT 1107 1486     Total Intake(mL/kg) 1107 (10.1) 1611 (14.7)     Urine (mL/kg/hr) 1500 (0.6) 900 (0.3)     Emesis/NG output  0 (0)     Drains 7.5 (0) 2.5 (0)     Other  0 (0)     Stool  0 (0)     Chest Tube 50 (0)      Total Output 1557.5 902.5      Net -450.5 +708.5             Stool Occurrence  1 x     Emesis Occurrence  0 x           Physical Exam  NAD, AAOx3  RRR  CTAB  Abd S/ND/ATTP  Minimal serosanguinous drain output    Significant Labs:  CBC:   Recent Labs  Lab 07/24/17  0437   WBC 4.09   RBC 4.10*   HGB 12.8*   HCT 37.0*      MCV 90   MCH 31.2*   MCHC 34.6     CMP:   Recent Labs  Lab 07/24/17 0437   GLU  180*   CALCIUM 9.1   ALBUMIN 2.7*   PROT 6.3   *   K 4.3   CO2 22*      BUN 16   CREATININE 1.3   ALKPHOS 63   ALT 49*   AST 25   BILITOT 0.7     CRP 69

## 2017-07-24 NOTE — DISCHARGE SUMMARY
[facility]  General Surgery  Discharge Summary      Patient Name: Mikie Walter Jr  MRN: [id number]  Admission Date: 7/18/2017  Hospital Length of Stay: 6 days  Discharge Date and Time:  07/24/2017 10:00 AM  Attending Physician: George Padilla MD   Discharging Provider: Nany Mercado NP  Primary Care Provider: Isrrael Lipscomb MD     HPI: s/p robotic esophagectomy    Procedure(s) (LRB):  ESOPHAGECTOMY-ROBOTIC (N/A)  PLACEMENT-TUBE-JEJUNOSTOMY (N/A)     DATE OF PROCEDURE:  07/18/2017     OPERATING SURGEON:  George Padilla M.D.     ASSISTANT:  William Rodriguez M.D. (RES)     PREOPERATIVE DIAGNOSIS:  Adenocarcinoma of the lower third of the esophagus,   status post neoadjuvant chemotherapy and radiation therapy.     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     OPERATIVE PROCEDURES:  Total thoracic esophagectomy, proximal gastrectomy, left   cervical esophagogastrostomy, mediastinal and abdominal lymphadenectomy, via   right chest, abdominal and left neck approach, robotic-assisted; Witzel   jejunostomy.    Hospital Course: The patient is NPO.  He is tolerating tube feeds without difficulty via jejunostomy tube. He is voiding and ambulating without difficulty.  Patient with no complaints of nausea, vomiting, chest pain or shortness of breath.  His vital signs stable. He is afebrile. He is positive for flatus and positive for bowel sounds.  CV: RRR  Lungs: CTA bilaterally  Abdomen:  Soft, non-tender, non-distended, positive for bowel sounds, incision clean, dry and intact.  Left Neck: Incision c/d/i with drain intact with minimal serous output.     Consults:   Consults         Status Ordering Provider     Inpatient consult to Dietary  Once     Provider:  (Not yet assigned)    Completed JERRY SORIANO     Inpatient consult to Endocrinology  Once     Provider:  (Not yet assigned)    Completed  JAYLENE RAY          Significant Diagnostic Studies: Labs:   CMP   Recent Labs  Lab 07/23/17  0511 07/24/17  0437   * 135*   K 4.5 4.3    101   CO2 25 22*   * 180*   BUN 15 16   CREATININE 1.4 1.3   CALCIUM 9.1 9.1   PROT 6.0 6.3   ALBUMIN 2.5* 2.7*   BILITOT 0.7 0.7   ALKPHOS 53* 63   AST 25 25   ALT 59* 49*   ANIONGAP 9 12   ESTGFRAFRICA 59.7* >60.0   EGFRNONAA 51.6* 56.5*    and CBC   Recent Labs  Lab 07/23/17  0511 07/24/17  0437   WBC 3.15* 4.09   HGB 12.5* 12.8*   HCT 36.3* 37.0*    160       Pending Diagnostic Studies:     None        Final Active Diagnoses:    Diagnosis Date Noted POA    PRINCIPAL PROBLEM:  Malignant neoplasm of lower third of esophagus [C15.5] 06/19/2017 Yes    CKD (chronic kidney disease), stage III [N18.3] 07/24/2017 Yes    On enteral nutrition [Z78.9] 07/21/2017 No    Coronary artery disease involving native coronary artery of native heart without angina pectoris [I25.10] 07/20/2017 Yes    H/O esophagectomy [Z90.49] 07/19/2017 Not Applicable    Type 2 diabetes mellitus with polyneuropathy [E11.42] 07/19/2017 Yes    Benign essential HTN [I10] 06/19/2017 Yes      Problems Resolved During this Admission:    Diagnosis Date Noted Date Resolved POA    Secondary DM with CKD stage 4 and hypertension [E13.22, I12.9, N18.4] 07/10/2017 07/19/2017 Yes    Type 2 diabetes mellitus without complication [E11.9] 06/19/2017 07/19/2017 Yes      Discharged Condition: good    Disposition:     Follow Up:  Follow-up Information     George Padilla MD. Schedule an appointment as soon as possible for a visit in 2 weeks.    Specialty:  General Surgery  Contact information:  Swapna JOSÉ STACY  [facility] [id number] [phone]                 Patient Instructions:     Diet general   Order Comments: Nothing by mouth except metoprolol and blood pressure medication with small sip of water.  Tube feeds every night, starting at 40cc/hr then increasing by 10cc/night until a  goal of 80cc/hr is reached. Run between 2pm-8am every night, and flush with 20cc water after disconnecting.   Administer 200cc water 4 times daily as a free water bolus.     Activity as tolerated     Shower on day dressing removed (No bath)     Lifting restrictions   Order Comments: Nothing greater than 10Lbs for 6 weeks     Other restrictions (specify):   Order Comments: Do not drive on narcotic pain medications until reaction times are back to a safe level.   Limit strenuous activity such as raking or pushing/pulling heavy objects     Call MD for:  temperature >100.4     Call MD for:  persistent nausea and vomiting or diarrhea     Call MD for:  severe uncontrolled pain     Call MD for:  difficulty breathing or increased cough     Leave dressing on - Keep it clean, dry, and intact until clinic visit       Medications:  Reconciled Home Medications:   Current Discharge Medication List      START taking these medications    Details   hydrocodone-acetaminophen (HYCET) solution 7.5-325 mg/15mL 15 mLs by Per J Tube route every 4 (four) hours as needed for Pain (Flush J-tube with 20mL water after medication).  Qty: 473 mL, Refills: 0         CONTINUE these medications which have CHANGED    Details   amlodipine (NORVASC) 5 MG tablet Take 1 tablet (5 mg total) by mouth once daily.  Qty: 30 tablet, Refills: 11      aspirin (ECOTRIN) 81 MG EC tablet Take 1 tablet (81 mg total) by mouth once daily.  Qty: 1 tablet, Refills: 0      lisinopril-hydrochlorothiazide (PRINZIDE,ZESTORETIC) 20-12.5 mg per tablet Take 1 tablet by mouth once daily.  Qty: 1 tablet, Refills: 0      pantoprazole (PROTONIX) 40 MG tablet Take 1 tablet (40 mg total) by mouth once daily.  Qty: 1 tablet, Refills: 0      prasugrel (EFFIENT) 10 mg Tab Take 1 tablet (10 mg total) by mouth once daily.  Qty: 1 tablet, Refills: 0      simvastatin (ZOCOR) 20 MG tablet Take 1 tablet (20 mg total) by mouth every evening.  Qty: 90 tablet, Refills: 3         CONTINUE these  medications which have NOT CHANGED    Details   metoprolol succinate (TOPROL-XL) 100 MG 24 hr tablet Take 100 mg by mouth once daily.          STOP taking these medications       KOMBIGLYZE XR 2.5-1,000 mg TM24 Comments:   Reason for Stopping:         liraglutide 0.6 mg/0.1 mL, 18 mg/3 mL, subq PNIJ (VICTOZA 2-RUTH) 0.6 mg/0.1 mL (18 mg/3 mL) PnIj Comments:   Reason for Stopping:               Nany Mercado NP  General Surgery  [facility]

## 2017-07-24 NOTE — PT/OT/SLP PROGRESS
Physical Therapy      Mikie Walter Jr  MRN: [id number]    Patient not seen today secondary to hospital discharge with complete discharge summary to follow.  George Yancey, PTA  7/24/2017

## 2017-07-24 NOTE — PROGRESS NOTES
Physical Therapy Discharge Summary    Mikie Walter Jr  MRN: [id number]   Malignant neoplasm of lower third of esophagus   Patient Discharged from acute Physical Therapy on 7/24/17.  Please refer to prior PT noted date on 7/23/17 for functional status.     Assessment:   Patient appropriate for care in another setting.  GOALS:    Physical Therapy Goals        Problem: Physical Therapy Goal    Goal Priority Disciplines Outcome Goal Variances Interventions   Physical Therapy Goal     PT/OT, PT Ongoing (interventions implemented as appropriate)     Description:  PT goals until 7/25/17    1. Pt supine to sit with supervision-not met  2. Pt sit to supine with supervision-not met  3. Pt sit to stand with no AD with supervision-not met  4. Pt to perform gait 200ft with no AD with supervision.-not met  5. Pt to go up/down curb step with no AD with CGA.-not met  6. Pt to perform B LE exs in sitting x 15 reps with handout.-not met                    Reasons for Discontinuation of Therapy Services  Transfer to alternate level of care.      Plan:  Patient Discharged to: Home with Home Health Service.

## 2017-07-24 NOTE — PLAN OF CARE
Patient discharging home with HH and TFs, patient accepted by [facility].  [facility] is currently in patient's room completing discharge instructions, will follow for additional needs.

## 2017-07-24 NOTE — TELEPHONE ENCOUNTER
----- Message from Otf [place] sent at 7/24/2017 10:13 AM CDT -----  Contact: Swati- Wife  Pt needs to schedule two week post op appt. Please call wife at [phone]

## 2017-07-24 NOTE — PLAN OF CARE
Patient discharging home today with [facility] and [facility] for tube feeds.  Follow up appts on AVS.    Future Appointments  Date Time Provider Department Center   8/2/2017 9:15 AM [facility] [id number] 350 LB LIMIT [facility] XRAY OP Marv Holloway   8/2/2017 10:15 AM George Padilla MD [facility] Jonny Lpiscomb ([facility] PCP) as requested 8/16/2017 @ 1:00pm       07/24/17 1104   Final Note   Assessment Type Final Discharge Note   Discharge Disposition Home-Health   Hospital Follow Up  Appt(s) scheduled? Yes   Discharge/Hospital Encounter Summary to ([facility]) PCP Yes   Referral to / orders for Home Health Complete? Yes   Any social issues identified prior to discharge? No   Did you assess the readiness or willingness of the family or caregiver to support self management of care? Yes   Right Care Referral Info   Post Acute Recommendation Home-care

## 2017-07-25 ENCOUNTER — TELEPHONE (OUTPATIENT)
Dept: ENDOCRINOLOGY | Facility: CLINIC | Age: 67
End: 2017-07-25

## 2017-07-25 NOTE — TELEPHONE ENCOUNTER
----- Message from Nani Bower sent at 7/25/2017 11:30 AM CDT -----  Contact: Jo-Ann / [facility] / [phone]  The nurse is requesting clarification on the order - she states the pump is not able to do what the order is requesting.

## 2017-07-25 NOTE — TELEPHONE ENCOUNTER
Called patient regarding pump issues. He had issues with his feeding pump, but I informed him that I helped him with his diabetes regimen at home and not his tube feedings. He reports the issue with his feedings has been resolved. Informed to contact [employer] company for any tube feeding issues.

## 2017-07-25 NOTE — PHYSICIAN QUERY
PT Name: Mikie Walter Jr  MR #: [id number]     Physician Query Form - Documentation Clarification      CDS/: Olimpia Spaulding               Contact information: ext [phone]    This form is a permanent document in the medical record.     Query Date: July 25, 2017    By submitting this query, we are merely seeking further clarification of documentation. Please utilize your independent clinical judgment when addressing the question(s) below.    The Medical record reflects the following:    Supporting Clinical Findings Location in Medical Record    CKD (chronic kidney disease), stage III    DM with CKD stage 4 and hypertension      Discharge summary    Discharge summary                                                                                        Doctor Randy: There is conflicting documentation regarding the stage of the ckd. Can you please clarify the correct stage of the ckd. ThankYou    CKD stage 3-------------------  CKD stage 4---------------------     Provider Use Only    DM with CKD stage 3B and hypertension                                                                                                                           [  ] Clinically undetermined

## 2017-07-26 ENCOUNTER — TELEPHONE (OUTPATIENT)
Dept: SURGERY | Facility: CLINIC | Age: 67
End: 2017-07-26

## 2017-07-26 NOTE — TELEPHONE ENCOUNTER
----- Message from Ana Mariscal sent at 7/26/2017 12:09 PM CDT -----  Contact: [facility]//ck  [phone]//caller states that she was calling in ref to a drug interaction//thew drugs are amlodapine and it interacts with Simbastatin//please call/thank you

## 2017-08-01 ENCOUNTER — TELEPHONE (OUTPATIENT)
Dept: RADIOLOGY | Facility: HOSPITAL | Age: 67
End: 2017-08-01

## 2017-08-02 ENCOUNTER — TELEPHONE (OUTPATIENT)
Dept: SURGERY | Facility: CLINIC | Age: 67
End: 2017-08-02

## 2017-08-02 ENCOUNTER — HOSPITAL ENCOUNTER (OUTPATIENT)
Dept: RADIOLOGY | Facility: HOSPITAL | Age: 67
Discharge: HOME | End: 2017-08-02
Attending: SURGERY
Payer: COMMERCIAL

## 2017-08-02 ENCOUNTER — OFFICE VISIT (OUTPATIENT)
Dept: SURGERY | Facility: CLINIC | Age: 67
End: 2017-08-02
Payer: MEDICARE

## 2017-08-02 VITALS
HEART RATE: 76 BPM | DIASTOLIC BLOOD PRESSURE: 91 MMHG | TEMPERATURE: 97 F | WEIGHT: 230.81 LBS | SYSTOLIC BLOOD PRESSURE: 132 MMHG | BODY MASS INDEX: 31.26 KG/M2 | HEIGHT: 72 IN

## 2017-08-02 DIAGNOSIS — Z09 POSTOP CHECK: Primary | ICD-10-CM

## 2017-08-02 DIAGNOSIS — C15.5 MALIGNANT NEOPLASM OF LOWER THIRD OF ESOPHAGUS: ICD-10-CM

## 2017-08-02 PROCEDURE — 99024 POSTOP FOLLOW-UP VISIT: CPT | Mod: ,,, | Performed by: SURGERY

## 2017-08-02 PROCEDURE — 25500020 PHARM REV CODE 255: Performed by: SURGERY

## 2017-08-02 PROCEDURE — 74220 X-RAY XM ESOPHAGUS 1CNTRST: CPT | Mod: 26,,, | Performed by: RADIOLOGY

## 2017-08-02 PROCEDURE — 99213 OFFICE O/P EST LOW 20 MIN: CPT | Mod: PBBFAC,25 | Performed by: SURGERY

## 2017-08-02 PROCEDURE — 99999 PR PBB SHADOW E&M-EST. PATIENT-LVL III: CPT | Mod: PBBFAC,,, | Performed by: SURGERY

## 2017-08-02 PROCEDURE — 74220 X-RAY XM ESOPHAGUS 1CNTRST: CPT | Mod: TC

## 2017-08-02 RX ORDER — PEN NEEDLE, DIABETIC 32 GX 1/4"
NEEDLE, DISPOSABLE MISCELLANEOUS
COMMUNITY
Start: 2017-07-27 | End: 2019-03-14

## 2017-08-02 RX ADMIN — IOHEXOL 100 ML: 350 INJECTION, SOLUTION INTRAVENOUS at 09:08

## 2017-08-02 NOTE — PROGRESS NOTES
Subjective:  67 y/o male s/p Esophagectomy 7/18. Doing very well at home. Tube feeds going well w/o issue. Tolerating sips of water with medications. No N/V, Abdominal pain, GERD symptoms. Incisions without complication. Able to preform ADLs but he is a little frustrated with the time spent on tube feeds during the day.     Objective:  A&O, NAD  RRR   LCTAB  ABD: s/nt/nd; incisions healing well without erythema or discharge.     Vitals:    08/02/17 1032   BP: (!) 132/91   Pulse: 76   Temp: 96.8 °F (36 °C)   TempSrc: Oral   Weight: 104.7 kg (230 lb 13.2 oz)   Height: 6' (1.829 m)         LABS:  Esophagram 8/2/17    A/P:  67 y/o male s/p esophagectomy. Doing well. No signs of leak on esophogram today    - Can start FLD at home       Nayan Carlson MD   [phone]  General Surgery [facility]

## 2017-08-02 NOTE — TELEPHONE ENCOUNTER
----- Message from Sindy Woods sent at 8/2/2017  3:46 PM CDT -----  Contact: wife/ neymar Longoria States that she needs a call returned by a nurse in reference to if the prescription was called in and if they have some other some other things for the feeding tube  .                   Please call Neymar @ [phone] . Thanks :)

## 2017-08-02 NOTE — PROGRESS NOTES
Doing very well. Only issue is an occasional dry cough  CS personally reviewed. No leak or DGE. There is some remaining septum between the esophageal terminus and the stomach but the anastomosis is widely patent.     OK to start liquids and advance as tolerated.   RTC two weeks.

## 2017-08-03 ENCOUNTER — TELEPHONE (OUTPATIENT)
Dept: ENDOCRINOLOGY | Facility: CLINIC | Age: 67
End: 2017-08-03

## 2017-08-03 NOTE — TELEPHONE ENCOUNTER
Spoke to patient and let him know that Felicita looked over his BG logs and would like him to increase Levemir to 9 units twice daily, increase Victoza to 1.2 mg daily.

## 2017-08-03 NOTE — TELEPHONE ENCOUNTER
Logs received, readings are 146-258. Increase Levemir to 9 units twice daily, increase Victoza to 1.2 mg daily.

## 2017-08-07 ENCOUNTER — TELEPHONE (OUTPATIENT)
Dept: SURGERY | Facility: CLINIC | Age: 67
End: 2017-08-07

## 2017-08-07 NOTE — TELEPHONE ENCOUNTER
----- Message from Claude Martinez sent at 8/7/2017 12:03 PM CDT -----  Contact: Swati//Wife  Caller states that (s)he needs to speak with nurse in ref to the directions for feeding tube//please call back at [phone]//thank you

## 2017-08-16 ENCOUNTER — OFFICE VISIT (OUTPATIENT)
Dept: SURGICAL ONCOLOGY | Facility: CLINIC | Age: 67
End: 2017-08-16
Payer: MEDICARE

## 2017-08-16 VITALS
BODY MASS INDEX: 31.45 KG/M2 | WEIGHT: 231.94 LBS | DIASTOLIC BLOOD PRESSURE: 81 MMHG | SYSTOLIC BLOOD PRESSURE: 134 MMHG | HEART RATE: 98 BPM | TEMPERATURE: 98 F

## 2017-08-16 DIAGNOSIS — Z09 POSTOP CHECK: Primary | ICD-10-CM

## 2017-08-16 PROCEDURE — 99213 OFFICE O/P EST LOW 20 MIN: CPT | Mod: PBBFAC,PN | Performed by: SURGERY

## 2017-08-16 PROCEDURE — 99024 POSTOP FOLLOW-UP VISIT: CPT | Mod: ,,, | Performed by: SURGERY

## 2017-08-16 PROCEDURE — 99999 PR PBB SHADOW E&M-EST. PATIENT-LVL III: CPT | Mod: PBBFAC,,, | Performed by: SURGERY

## 2017-08-16 NOTE — PROGRESS NOTES
S/p RATLE 7/18/17 for KARISHMA [id number] with 0/16 nodes involved  Today, he is doing well. Tolerating solid diet beautifully and wants his Jtube out. Having 2-3 loose stools per day. Weight stable since stopping Jtube feeds 10 days ago    Neck well healed  Chest clear  Abd: normal. Jtube removed    Rec:  RTC one month  F/U with Dr Bunn

## 2017-08-16 NOTE — LETTER
August 16, 2017        Jeff Bunn MD  [address]   [address]  [place] [address]             [facility]  [address], [address]  [facility] [address]  Phone: [phone]  Fax: [phone]   Patient: Mikie Walter Jr   MR Number: [id number]   YOB: 1950   Date of Visit: 8/16/2017       Dear Dr. Bunn:    Thank you for referring Mikie Walter Jr to me for evaluation. Attached you will find relevant portions of my assessment and plan of care.    If you have questions, please do not hesitate to call me. I look forward to following Mikie Walter Jr along with you.    Sincerely,      George Padilla MD            CC  No Recipients    Enclosure

## 2017-08-17 ENCOUNTER — TELEPHONE (OUTPATIENT)
Dept: ENDOCRINOLOGY | Facility: CLINIC | Age: 67
End: 2017-08-17

## 2017-08-30 ENCOUNTER — TELEPHONE (OUTPATIENT)
Dept: ENDOCRINOLOGY | Facility: CLINIC | Age: 67
End: 2017-08-30

## 2017-08-30 NOTE — TELEPHONE ENCOUNTER
----- Message from Nani Bower sent at 8/30/2017  9:04 AM CDT -----  Contact: Self [phone]  Pt states he was switched to Levemir while in the hospital and he is having some trouble. He experiences a cramp then bowel movement that is only water at least one a day.

## 2017-08-30 NOTE — TELEPHONE ENCOUNTER
"Returning patients call regarding diarrhea with the Levemir.     Meds: Levemir 7 units BID and Victoza 1.2 mg daily.     Reports BG are running "great" 110-117, 105-95, 110-115    He would like to stay on insulin as he reports BG readings are the best they have ever been.   He would like to stay on the Levemir instead of changing to Metformin or Lantus. Discussed this at length.   Discussed the diarrhea could be coming from the Victoza. For now, he would like to continue on his current regimen.     Switch Levemir to 14 units daily. Hold Levemir dose tonight and start tomorrow. Continue Victoza. - caution with diarrhea. He v/u.   Call if diarrhea continues.v/u. No questions.    "

## 2017-09-11 ENCOUNTER — OFFICE VISIT (OUTPATIENT)
Dept: SURGERY | Facility: CLINIC | Age: 67
End: 2017-09-11
Payer: COMMERCIAL

## 2017-09-11 VITALS
BODY MASS INDEX: 30.93 KG/M2 | DIASTOLIC BLOOD PRESSURE: 87 MMHG | TEMPERATURE: 98 F | HEART RATE: 71 BPM | WEIGHT: 228.38 LBS | HEIGHT: 72 IN | SYSTOLIC BLOOD PRESSURE: 136 MMHG

## 2017-09-11 DIAGNOSIS — Z09 POSTOP CHECK: Primary | ICD-10-CM

## 2017-09-11 PROCEDURE — 99213 OFFICE O/P EST LOW 20 MIN: CPT | Mod: PBBFAC | Performed by: SURGERY

## 2017-09-11 PROCEDURE — 99024 POSTOP FOLLOW-UP VISIT: CPT | Mod: ,,, | Performed by: SURGERY

## 2017-09-11 PROCEDURE — 99999 PR PBB SHADOW E&M-EST. PATIENT-LVL III: CPT | Mod: PBBFAC,,, | Performed by: SURGERY

## 2017-09-11 NOTE — LETTER
September 11, 2017        Jeff Bunn MD  [address]   [address]  [place] [address]             [facility]  [address] [address]  Phone: [phone]   Patient: Mikie Walter Jr   MR Number: [id number]   YOB: 1950   Date of Visit: 9/11/2017       Dear Dr. Bunn:    Thank you for referring Mikie Walter Jr to me for evaluation. Attached you will find relevant portions of my assessment and plan of care.    If you have questions, please do not hesitate to call me. I look forward to following Mikie Walter Jr along with you.    Sincerely,      George Padilla MD            CC  No Recipients    Enclosure

## 2017-09-11 NOTE — PROGRESS NOTES
S/p RATLE, after neoadjuvant chemort for KARISHMA of esophagus/EG jxn. Final path [id number] with 0/16 nodes involved.  Jtube removed at last visit  Today:  Weight 228.6 (down 3 lbs since 8/16)  Overall, doing very well  Appetite good and no upper digestive issues  BMs still not back to their preop pattern; he has been having a large, mostly liquid BM with cramps about every other day, with no regular BMs in the meantime. He has not been able to relate it to any particular food.     He saw Dr Bunn last week and had a followup schedule laid out    Rec:  RTC prn  Keep diet log and call to review it in 6-8 weeks if BMs not better.     Copy Dr Bunn

## 2017-09-11 NOTE — PROGRESS NOTES
Mikie Walter Jr is a 66 y.o. male with esophageal adenocarcinoma s/p RATLE 7/18/17 for KARISHMA [id number] with 0/16 nodes involved. He returns for follow up today doing very well. He is tolerating solid foods without any issue.  Denies any pain, nauses, vomiting, reflux.     1. Postop check      Past Medical History:   Diagnosis Date    CAD (coronary artery disease)     CKD (chronic kidney disease), stage IV     Diabetes mellitus, type II     Esophageal carcinoma     GERD (gastroesophageal reflux disease)     H/O therapeutic radiation     History of chemotherapy     History of kidney cancer     Hyperlipemia     Hypertension     Status post chemoradiation      No past surgical history pertinent negatives on file.  Scheduled Meds:  Continuous Infusions:  PRN Meds:    Review of patient's allergies indicates:   Allergen Reactions    Ciprofloxacin Hives and Swelling     There are no hospital problems to display for this patient.    Blood pressure 136/87, pulse 71, temperature 98.1 °F (36.7 °C), temperature source Oral, height 6' (1.829 m), weight 103.6 kg (228 lb 6.3 oz).    Subjective   WDWN, NAD  RRR  CTAB  Abdomen soft, NT/ND. Lap incisions c/d/i\    Objective   Assessment & Plan      67 yo male s/p RATLE on 07/18/17    -Doing very well  -RTC PRN     Miguel Sexton MD  9/11/2017

## 2017-10-09 ENCOUNTER — TELEPHONE (OUTPATIENT)
Dept: SURGERY | Facility: CLINIC | Age: 67
End: 2017-10-09

## 2017-10-09 NOTE — TELEPHONE ENCOUNTER
----- Message from Sindy Woods sent at 10/9/2017  9:53 AM CDT -----  Contact: Carlo cornelius/ Dr. Jeff roblero/ Marlo Sellers States that he needs a call returned by a nurse in reference to needing pathology reports for Edward ,Please call Marlo @ [phone]  . Thanks :)

## 2017-10-17 ENCOUNTER — TELEPHONE (OUTPATIENT)
Dept: SURGERY | Facility: CLINIC | Age: 67
End: 2017-10-17

## 2017-10-17 NOTE — TELEPHONE ENCOUNTER
----- Message from Otf Penn sent at 10/17/2017  9:06 AM CDT -----  Contact: Mrs. Walter- Wife  Caller wants to make sure you will be here today. Caller said they have some of the feeding stuff to drop off. Please call regarding this at [phone]

## 2018-05-09 NOTE — ASSESSMENT & PLAN NOTE
Assessment/Plan


Assessment/Plan





ASSESSMENT:  The patient is a 63-year-old female with,





Sepsis, improving- 2ry to ESBL UTI- ?bacteremia (real vs contaminant)- r/o PICC 

line infection


  -u/a wbc 10-15,  niet neg, leuk +2; ucx >100K ESBL E.coli 


  -sp cx p


  -5/4 Bcx 2/4 S. epi (MRSE); 5/7 NTD; 5/8 PICC line Bcx p





Fever/leukocytosis, resolved


  





Lethargy, on Bipap- improvnig- ?oversedation


   -CXR 5/3: Parenchymal opacities, primarily interstitial laterally 

demonstrated throughout the lungs with some interval improvement over the last 

24 hours


  -CXR;Extensive mixed interstitial alveolar airspace disease bilaterally 

unchanged





Probable small bowel obstruction


Recent history of polymicrobial gram-negative bacteremia including 

Stenotrophomonas maltophilia and Enterobacter.


History of Candida esophagitis.


History of C. difficile in the past.











Air anterior to the liver , ?  free intraperitoneal air ( Surg doubt 

perforation )


Lung and liver masses Ro Malignancy 


 CT:  Pleural-based mass at the left lung base / Moderate-sized left pleural 

effusion, left-sided pleural nodularity and retroperitoneal and paraspinal 

nodules/masses. 


         Left inferior hilar mass lesion partially visualized. New low-

attenuation liver lesion ( concerning for malignancy/metastatic disease )


Pl effusion SP ultrasound-guided thoracentesis,  960 cc  ( m/l 2/2 mets,  no 

evid of Empyema )


  -exudate fluid:  (n 6), pH 8, lguc 98, prot 4.6 (serum 8.4); cx stain: 


  GRAM STAIN  Final  


        GRAM STAIN RESULT           FEW WHITE BLOOD CELLS


                                    NO ORGANISMS SEEN


cx negative





prelime cytology report:  malignant non-small cells in the pleural cavity. 





Crohn's disease.


History of bowel obstruction x2 with lysis of adhesions in 2005.


COPD.


History of chronic pain syndrome, on morphine pump.


CVA in 2005.


Seizure disorder.








PLAN:





Continue ertapenem abx d#6/7-10 for ESBL E.coli UTI





-Continue IV Vancomycin #5 for Staph epi bacteremia pending Bcx from PICc line


   -continue antibiotics if consistent with goals of care





  -5/7 SP Meropenem #3


  -5/6 SP Zosyn #2


  -4/25 SP Zosyn #5








Monitor CBC and BMP.


follow GI recommendations


hem, surg f/u


discussions of goals of care ongoing


poor px


CXR am





Subjective


Allergies:  


Coded Allergies:  


     No Known Allergies (Verified , 10/27/06)


Subjective


afebrile 


leukocytosis resolved


repeat Bcx NTD


BCx PICC lien p





Objective


Vital Signs





Last 24 Hour Vital Signs








  Date Time  Temp Pulse Resp B/P (MAP) Pulse Ox O2 Delivery O2 Flow Rate FiO2


 


5/9/18 12:00 98.5 99 18 137/77 96 Non-Rebreather 15.0 





 98.5       


 


5/9/18 11:06      Non-Rebreather  


 


5/9/18 11:05      Non-Rebreather  


 


5/9/18 08:24  101 22  100 Non-Rebreather 15.0 100


 


5/9/18 08:14  100 20  100 Non-Rebreather 15.0 100


 


5/9/18 08:11      Non-Rebreather 15.0 100


 


5/9/18 08:10     100 Non-Rebreather 15.0 100


 


5/9/18 08:00 97.4 89 11 96/90 96 Non-Rebreather 15.0 





 97.4       


 


5/9/18 04:53      Non-Rebreather 15.0 


 


5/9/18 04:00 97.4 98 22 102/58 94   





 97.4       


 


5/9/18 02:44      Non-Rebreather 15.0 100


 


5/9/18 02:44  97 12  89 Non-Rebreather 15.0 100


 


5/9/18 00:00 97.7 100 19 103/68 94   





 97.7       


 


5/8/18 23:09  99 14  88 Non-Rebreather 15.0 100


 


5/8/18 23:09      Non-Rebreather 15.0 100


 


5/8/18 20:00 97.3 101 22 102/61 94   





 97.3       


 


5/8/18 19:49 98.9       


 


5/8/18 19:09  102 22  90 Non-Rebreather 15.0 100


 


5/8/18 19:09      Non-Rebreather 15.0 100


 


5/8/18 19:00      Non-Rebreather 15.0 100


 


5/8/18 19:00     90 Non-Rebreather 15.0 100


 


5/8/18 18:37 98.9       


 


5/8/18 16:18  97 18  94 Non-Rebreather 15.0 100


 


5/8/18 16:08  94 18  91 Non-Rebreather 15.0 100


 


5/8/18 16:00 98.9 101 21 96/62 98 Non-Rebreather 15.0 100





 98.9       


 


5/8/18 16:00  96      








Height (Feet):  5


Height (Inches):  6.00


Weight (Pounds):  129


Objective


HEENT:  anicteric


Respiratory/Chest:  normal breath sounds


Cardiovascular:  regular rhythm


Abdomen:  tender





Microbiology








 Date/Time


Source Procedure


Growth Status


 


 


 5/7/18 12:10


Blood Blood Culture - Preliminary


NO GROWTH AFTER 24 HOURS Resulted


 


 5/7/18 12:00


Blood Blood Culture - Preliminary


NO GROWTH AFTER 24 HOURS Resulted











Laboratory Tests








Test


  5/9/18


07:30


 


White Blood Count


  9.4 K/UL


(4.8-10.8)


 


Red Blood Count


  3.69 M/UL


(4.20-5.40)  L


 


Hemoglobin


  10.6 G/DL


(12.0-16.0)  L


 


Hematocrit


  33.9 %


(37.0-47.0)  L


 


Mean Corpuscular Volume 92 FL (80-99)  


 


Mean Corpuscular Hemoglobin


  28.6 PG


(27.0-31.0)


 


Mean Corpuscular Hemoglobin


Concent 31.2 G/DL


(32.0-36.0)  L


 


Red Cell Distribution Width


  15.2 %


(11.6-14.8)  H


 


Platelet Count


  83 K/UL


(150-450)  L


 


Mean Platelet Volume


  9.2 FL


(6.5-10.1)


 


Neutrophils (%) (Auto)


  % (45.0-75.0)


 


 


Lymphocytes (%) (Auto)


  % (20.0-45.0)


 


 


Monocytes (%) (Auto)  % (1.0-10.0)  


 


Eosinophils (%) (Auto)  % (0.0-3.0)  


 


Basophils (%) (Auto)  % (0.0-2.0)  


 


Differential Total Cells


Counted 100  


 


 


Neutrophils % (Manual) 68 % (45-75)  


 


Lymphocytes % (Manual) 21 % (20-45)  


 


Monocytes % (Manual) 2 % (1-10)  


 


Eosinophils % (Manual) 4 % (0-3)  H


 


Basophils % (Manual) 0 % (0-2)  


 


Band Neutrophils 5 % (0-8)  


 


Platelet Estimate Decreased  L


 


Platelet Morphology Normal  


 


Hypochromasia 1+  


 


Anisocytosis 1+  


 


Stomatocytes Occasional  


 


Sodium Level


  146 MMOL/L


(136-145)  H


 


Potassium Level


  3.1 MMOL/L


(3.5-5.1)  L


 


Chloride Level


  110 MMOL/L


()  H


 


Carbon Dioxide Level


  29 MMOL/L


(21-32)


 


Anion Gap


  7 mmol/L


(5-15)


 


Blood Urea Nitrogen


  19 mg/dL


(7-18)  H


 


Creatinine


  1.0 MG/DL


(0.55-1.30)


 


Estimat Glomerular Filtration


Rate > 60 mL/min


(>60)


 


Glucose Level


  114 MG/DL


()  H


 


Calcium Level


  9.3 MG/DL


(8.5-10.1)











Current Medications








 Medications


  (Trade)  Dose


 Ordered  Sig/Jed


 Route


 PRN Reason  Start Time


 Stop Time Status Last Admin


Dose Admin


 


 Acetaminophen


  (Tylenol)  650 mg  Q4H  PRN


 ORAL


 fever (temp>100.5F)  5/8/18 18:30


 5/19/18 18:29   


 


 


 Acetaminophen


  (Tylenol)  650 mg  Q4H  PRN


 RECTAL


 Mild Pain (Pain Scale 1-3)  5/8/18 18:30


 6/4/18 18:29   


 


 


 Acetaminophen/


 Hydrocodone Bitart


  (Norco 10/325)  1 tab  Q4H  PRN


 ORAL


 Moderate Pain (Pain Scale 4-6)  5/8/18 19:00


 5/11/18 18:59  5/8/18 18:37


 


 


 Chlorhexidine


 Gluconate


  (Rosy-Hex 2%)  1 applic  DAILY@2000


 TOPIC


   5/8/18 20:00


 6/5/18 19:59  5/8/18 21:58


 


 


 Dextrose  1,000 ml @ 


 100 mls/hr  Q10H


 IV


   5/8/18 18:30


 6/4/18 09:59  5/9/18 14:57


 


 


 Dextrose


  (Dextrose 50%)  50 ml  STAT  PRN


 IV


 Hypoglycemia BS<60mg/dL  5/8/18 18:30


 6/7/18 18:29   


 


 


 Ertapenem 1 gm/


 Sodium Chloride  55 ml @ 


 110 mls/hr  Q24H


 IVPB


   5/8/18 21:00


 5/12/18 20:59  5/8/18 22:00


 


 


 Levalbuterol HCl


  (Xopenex)  0.625 mg  Q4HRT


 HHN


   5/8/18 19:00


 5/13/18 18:59  5/9/18 08:14


 


 


 Levetiracetam


  (Keppra)  1,000 mg  EVERY 8  HOURS


 ORAL


   5/8/18 22:00


 6/3/18 08:59  5/9/18 13:51


 


 


 Levothyroxine


 Sodium


  (Synthroid)  75 mcg  ACBREAKFAST


 ORAL


   5/9/18 06:30


 5/20/18 06:29  5/9/18 06:31


 


 


 Nitroglycerin


  (Ntg)  0.4 mg  Q5M X 3 DOSES PRN


 SL


 Prn Chest Pain  5/8/18 18:30


 5/19/18 10:59   


 


 


 Ondansetron HCl


  (Zofran)  4 mg  Q6H  PRN


 IVP


 Nausea & Vomiting  5/8/18 18:30


 5/19/18 18:29   


 


 


 Vancomycin HCl


  (Vanco rx to


 dose)  1 ea  DAILY  PRN


 MISC


 Per rx protocol  5/8/18 18:30


 6/7/18 18:29   


 


 


 Vancomycin HCl 1


 gm/Dextrose  275 ml @ 


 183.708


 mls/hr  Q24H


 IVPB


   5/9/18 17:00


 5/14/18 16:59   


 

















Selene Garcia M.D. May 9, 2018 15:52 Endocrine consult    -monitoring

## 2018-08-18 RX ORDER — PANTOPRAZOLE SODIUM 40 MG/1
TABLET, DELAYED RELEASE ORAL
Qty: 30 TABLET | Refills: 10 | Status: SHIPPED | OUTPATIENT
Start: 2018-08-18 | End: 2019-09-16 | Stop reason: SDUPTHER

## 2019-03-14 ENCOUNTER — OFFICE VISIT (OUTPATIENT)
Dept: ENDOCRINOLOGY | Facility: CLINIC | Age: 69
End: 2019-03-14
Payer: MEDICARE

## 2019-03-14 VITALS
HEART RATE: 58 BPM | BODY MASS INDEX: 28.22 KG/M2 | SYSTOLIC BLOOD PRESSURE: 115 MMHG | HEIGHT: 72 IN | WEIGHT: 208.38 LBS | DIASTOLIC BLOOD PRESSURE: 76 MMHG

## 2019-03-14 DIAGNOSIS — I10 BENIGN ESSENTIAL HTN: ICD-10-CM

## 2019-03-14 DIAGNOSIS — E11.65 TYPE 2 DIABETES MELLITUS WITH HYPERGLYCEMIA, WITH LONG-TERM CURRENT USE OF INSULIN: ICD-10-CM

## 2019-03-14 DIAGNOSIS — E66.3 OVERWEIGHT (BMI 25.0-29.9): ICD-10-CM

## 2019-03-14 DIAGNOSIS — E78.2 MIXED HYPERLIPIDEMIA: ICD-10-CM

## 2019-03-14 DIAGNOSIS — Z79.4 TYPE 2 DIABETES MELLITUS WITH HYPERGLYCEMIA, WITH LONG-TERM CURRENT USE OF INSULIN: ICD-10-CM

## 2019-03-14 DIAGNOSIS — E11.42 TYPE 2 DIABETES MELLITUS WITH POLYNEUROPATHY: Primary | ICD-10-CM

## 2019-03-14 DIAGNOSIS — N18.30 CKD (CHRONIC KIDNEY DISEASE), STAGE III: ICD-10-CM

## 2019-03-14 DIAGNOSIS — I25.10 CORONARY ARTERY DISEASE INVOLVING NATIVE CORONARY ARTERY OF NATIVE HEART WITHOUT ANGINA PECTORIS: ICD-10-CM

## 2019-03-14 PROBLEM — Z78.9 ON ENTERAL NUTRITION: Status: RESOLVED | Noted: 2017-07-21 | Resolved: 2019-03-14

## 2019-03-14 PROCEDURE — 99999 PR PBB SHADOW E&M-EST. PATIENT-LVL III: ICD-10-PCS | Mod: PBBFAC,,, | Performed by: NURSE PRACTITIONER

## 2019-03-14 PROCEDURE — 99214 PR OFFICE/OUTPT VISIT, EST, LEVL IV, 30-39 MIN: ICD-10-PCS | Mod: S$GLB,,, | Performed by: NURSE PRACTITIONER

## 2019-03-14 PROCEDURE — 99213 OFFICE O/P EST LOW 20 MIN: CPT | Mod: PBBFAC,PN | Performed by: NURSE PRACTITIONER

## 2019-03-14 PROCEDURE — 99214 OFFICE O/P EST MOD 30 MIN: CPT | Mod: S$GLB,,, | Performed by: NURSE PRACTITIONER

## 2019-03-14 PROCEDURE — 99999 PR PBB SHADOW E&M-EST. PATIENT-LVL III: CPT | Mod: PBBFAC,,, | Performed by: NURSE PRACTITIONER

## 2019-03-14 RX ORDER — CLOPIDOGREL BISULFATE 75 MG/1
75 TABLET ORAL DAILY
COMMUNITY
Start: 2019-03-07

## 2019-03-14 RX ORDER — ZOLPIDEM TARTRATE 10 MG/1
10 TABLET ORAL NIGHTLY
Status: ON HOLD | COMMUNITY
End: 2023-02-24 | Stop reason: HOSPADM

## 2019-03-14 RX ORDER — METOPROLOL SUCCINATE 100 MG/1
100 TABLET, EXTENDED RELEASE ORAL DAILY
Status: ON HOLD | COMMUNITY
End: 2023-02-24 | Stop reason: HOSPADM

## 2019-03-14 RX ORDER — TRIAMTERENE AND HYDROCHLOROTHIAZIDE 37.5; 25 MG/1; MG/1
1 CAPSULE ORAL EVERY MORNING
Qty: 30 CAPSULE | Refills: 11 | Status: ON HOLD
Start: 2019-03-14 | End: 2023-02-24 | Stop reason: HOSPADM

## 2019-03-14 RX ORDER — METFORMIN HYDROCHLORIDE 500 MG/1
1000 TABLET ORAL 2 TIMES DAILY WITH MEALS
Qty: 360 TABLET | Refills: 3 | Status: SHIPPED | OUTPATIENT
Start: 2019-03-14 | End: 2019-06-21

## 2019-03-14 NOTE — ASSESSMENT & PLAN NOTE
Uncontrolled.   A1c above goal   Patient would like to stay on insulin at this time and not explore other options.     -- Reviewed goals of therapy are to get the best control we can without hypoglycemia    Medication changes:   Continue: Levemir. Change to 30 units daily in the AM to help with compliance and ease treatment regimen.   Start: Metformin titrate up to 1000 mg BID repeat renal function with RTC.     -- Reviewed patient's current insulin regimen. Clarified proper insulin dose and timing in relation to meals, etc. Insulin injection sites and proper rotation instructed. Switch to daily insulin injections.      -- Advised frequent self blood glucose monitoring.  Patient encouraged to document glucose results and bring them to every clinic visit. rx for freestyle sumaya. A freestyle sumaya was placed on patient in clinic today. Send me readings periodically. Explained the sumaya during visit today.     -- Hypoglycemia precautions discussed. Instructed on precautions before driving.    -- Call for Bg repeatedly < 90 or > 180.   -- Close adherence to lifestyle changes recommended.   -- Periodic follow ups for eye evaluations, foot care and dental care suggested.    Will get eye records from [facility]

## 2019-03-14 NOTE — PATIENT INSTRUCTIONS
Metformin 500mg pills:  1 pill once daily at night for 3-4 nights THEN  1 pill with breakfast, 1 pill with dinner for next 3-4 days THEN  1 pill with breakfast, 2 pills with dinner for next 3-4 days THEN  2 pills with breakfast, 2 pills with dinner     Levemir 30 units daily in the AM     Snacks can be an important part of a balanced, healthy meal plan. They allow you to eat more frequently, feeling full and satisfied throughout the day. Also, they allow you to spread carbohydrates evenly, which may stabilize blood sugars.  Plus, snacks are enjoyable!     The amount of carbohydrate needed at snacks varies. Generally, about 15-30 grams of carbohydrate per snack is recommended.  Below you will find some tasty treats.       0-5 gm carb   Crystal Light   Vitamin Water Zero   Herbal tea, unsweetened   2 tsp peanut butter on celery   1./2 cup sugar-free jell-o   1 sugar-free popsicle   ¼ cup blueberries   8oz Blue Garima unsweetened almond milk   5 baby carrots & celery sticks, cucumbers, bell peppers dipped in ¼ cup salsa, 2Tbsp light ranch dressing or 2Tbsp plain Greek yogurt   10 Goldfish crackers   ½ oz low-fat cheese or string cheese   1 closed handful of nuts, unsalted   1 Tbsp of sunflower seeds, unsalted   1 cup Smart Pop popcorn   1 whole grain brown rice cake        15 gm carb   1 small piece of fruit or ½ banana or 1/2 cup lite canned fruit   3 saba cracker squares   3 cups Smart Pop popcorn, top spray butter, Taylor lite salt or cinnamon and Truvia   5 Vanilla Wafers   ½ cup low fat, no added sugar ice cream or frozen yogurt (Blue bell, Blue Bunny, Weight Watchers, Skinny Cow)   ½ turkey, ham, or chicken sandwich   ½ c fruit with ½ c Cottage cheese   4-6 unsalted wheat crackers with 1 oz low fat cheese or 1 tbsp peanut butter    30-45 goldfish crackers (depending on flavor)    7-8 Tenriism mini brown rice cakes (caramel, apple cinnamon, chocolate)    12 Tenriism mini brown rice cakes  (cheddar, bbq, ranch)    1/3 cup hummus dip with raw veg   1/2 whole wheat tammie, 1Tbsp hummus   Mini Pizza (1/2 whole wheat English muffin, low-fat  cheese, tomato sauce)   100 calorie snack pack (Oreo, Chips Ahoy, Ritz Mix, Baked Cheetos)   4-6 oz. light or Greek Style yogurt (Chobani, Yoplait, Okios, [place]oneyfield)   ½ cup sugar-free pudding     6 in. wheat tortilla or tammie oven toasted chips (topped with spray butter flavoring, cinnamon, Truvia OR spray butter, garlic powder, chili powder)    18 BBQ Popchips (available at [employer], [employer], Fresh Market)

## 2019-03-14 NOTE — PROGRESS NOTES
CC:   Chief Complaint   Patient presents with    Diabetes     type 2       HPI: Mikie Walter Jr is a 68 y.o. male presents for an initial visit today.   The patient was diagnosed with Type 2 diabetes in 1990's on routine lab work. He was initially started on Metformin. Insulin therapy was started in July 2017 when he was hospitalized for esophageal CA.      Family hx of diabetes: father side of the family.   Hospitalized for diabetes: denies     No personal or FH of thyroid cancer or personal of pancreatic cancer or pancreatitis.     He has a hx of kidney cancer s/p left nephrectomy in 11/2016 and esophageal adenocarcinoma, diagnosed in 10/2016. He is s/p esophagectomy in July 2017. He underwent chemotherapy and radiation. They found 2 more spots on his lung recently and received a couple rounds of radiation but is now just being monitored. He follows with Dr. Bunn at [facility].     He is doing well post esophagectomy. He is no longer on TF and has no problem swallowing pills.     Dr. Hurley is his cardiologist and manages a lot of his PCP needs.       MEDICATIONS, ALLERGIES, LABS, VS's, MEDICAL, SURGICAL, SOCIAL, AND FAMILY HISTORY reviewed and updated in the appropriate sections during this encounter    DIABETES COMPLICATIONS: peripheral neuropathy and cardiovascular disease  CAD, S/p CABG x3 in 2011    Diabetes Management Status    ASA:  Yes - 81 mg daily     Statin: Taking  ACE/ARB: not - previously taking Lisinopril     Screening or Prevention Patient's value Goal Complete/Controlled?   HgA1C Testing and Control   Lab Results   Component Value Date    HGBA1C 6.3 (H) 07/18/2017      Annually/Less than 8% No   Lipid profile : 01/22/2019 Annually No   LDL control No results found for: LDLCALC Annually/Less than 100 mg/dl  No   Nephropathy screening No results found for: LABMICR  No results found for: PROTEINUA Annually No   Blood pressure BP Readings from Last 1 Encounters:   03/14/19 115/76    Less than  140/90 Yes   Dilated retinal exam November 2018- no DR. Tay'marina Kitchen  Annually No   Foot exam   : 03/14/2019 Annually No       CURRENT A1C:      Hemoglobin A1C   Date Value Ref Range Status   07/18/2017 6.3 (H) 4.0 - 5.6 % Final     Comment:     According to ADA guidelines, hemoglobin A1c <7.0% represents  optimal control in non-pregnant diabetic patients. Different  metrics may apply to specific patient populations.   Standards of Medical Care in Diabetes-2016.  For the purpose of screening for the presence of diabetes:  <5.7%     Consistent with the absence of diabetes  5.7-6.4%  Consistent with increasing risk for diabetes   (prediabetes)  >or=6.5%  Consistent with diabetes  Currently, no consensus exists for use of hemoglobin A1c  for diagnosis of diabetes for children.  This Hemoglobin A1c assay has significant interference with fetal   hemoglobin   (HbF). The results are invalid for patients with abnormal amounts of   HbF,   including those with known Hereditary Persistence   of Fetal Hemoglobin. Heterozygous hemoglobin variants (HbAS, HbAC,   HbAD, HbAE, HbA2) do not significantly interfere with this assay;   however, presence of multiple variants in a sample may impact the %   interference.       Outpatient labs                    GOAL A1C: 7% with out hypoglycemia.     DM MEDICATIONS USED IN THE PAST: Metformin- stopped due to difficulty swallowing pills  victoza- readings had improved. Shekhar Mora      CURRENT DIABETES MEDICATIONS: Levemir 15 units BID.   Insulin: pens.    Missed doses: rarely missing doses.      DO YOU USE THE [employer] EMAIL SYSTEM? No    BLOOD GLUCOSE MONITORING: checks on average 3-4 x per day   Presents with logs from home.     112, 169, 145, 191, 1414, 231, 136, 148, 111, 124, 186, 301, 201, 203, 206, 151, 222, 142, 134, 116, 143, 145, 153, 123, 116, 171, 97, 122, 138, 141, 108, 140, 132, 136, 142, 108,92, 143, 139, 145, 115, 117, 135, 116, 124, 128, 138,         HYPOGLYCEMIA:  No    MEALS: eating 3 meals per day   BF: yogurt and glass of milk; sometimes egg with toast, grits  Lunch: sandwich- eats at home or left overs. Soup.   Dinner: roast and yellow squash. Red beans and rice.   Snack: nuts, peanut butter   Drinks: whole milk, coke zero, crystal light tea.   Stopped the OJ      CURRENT EXERCISE:  No    Review of Systems  Review of Systems   Constitutional: Negative for appetite change and fatigue.   HENT: Negative for trouble swallowing.    Eyes: Negative for visual disturbance.   Respiratory: Negative for shortness of breath.    Cardiovascular: Negative for chest pain.   Gastrointestinal: Negative for nausea.   Endocrine: Negative for polydipsia, polyphagia and polyuria.   Genitourinary:        No Nocturia    Skin: Negative for wound.   Neurological: Negative for numbness.       Physical Exam   Physical Exam   Constitutional: He is oriented to person, place, and time. He appears well-developed and well-nourished. No distress.   HENT:   Head: Normocephalic and atraumatic.   Right Ear: External ear normal.   Left Ear: External ear normal.   Nose: Nose normal.   Neck: Normal range of motion. Neck supple. Normal carotid pulses present. Carotid bruit is not present. No tracheal deviation present. No thyromegaly present.       Cardiovascular: Normal rate and regular rhythm.   No murmur heard.  Pulmonary/Chest: Effort normal and breath sounds normal. No respiratory distress.   Abdominal: Soft. There is no tenderness. No hernia.   Musculoskeletal: He exhibits no edema.   Neurological: He is alert and oriented to person, place, and time. No cranial nerve deficit.   Skin: Skin is warm and dry. Capillary refill takes less than 2 seconds. No rash noted. He is not diaphoretic.   Injection sites are normal appearing. No lipo hypertropthy or atrophy     Psychiatric: He has a normal mood and affect. His behavior is normal. Judgment normal.   Nursing note and vitals  reviewed.      FOOT EXAMINATION: appropriate footwear.   Vibration intact.     Protective Sensation (w/ 10 gram monofilament):  Right: Intact  Left: Intact    Visual Inspection:  Normal -  Bilateral and Nails Intact - without Evidence of Foot Deformity- Bilateral    Pedal Pulses:   Right: Present  Left: Present    Posterior tibialis:   Right:Present  Left: Present        No results found for: TSH        Type 2 diabetes mellitus with hyperglycemia, with long-term current use of insulin  Uncontrolled.   A1c above goal   Patient would like to stay on insulin at this time and not explore other options.     -- Reviewed goals of therapy are to get the best control we can without hypoglycemia    Medication changes:   Continue: Levemir. Change to 30 units daily in the AM to help with compliance and ease treatment regimen.   Start: Metformin titrate up to 1000 mg BID repeat renal function with RTC.     -- Reviewed patient's current insulin regimen. Clarified proper insulin dose and timing in relation to meals, etc. Insulin injection sites and proper rotation instructed. Switch to daily insulin injections.      -- Advised frequent self blood glucose monitoring.  Patient encouraged to document glucose results and bring them to every clinic visit. rx for freestyle sumaya. A freestyle sumaya was placed on patient in clinic today. Send me readings periodically. Explained the sumaya during visit today.     -- Hypoglycemia precautions discussed. Instructed on precautions before driving.    -- Call for Bg repeatedly < 90 or > 180.   -- Close adherence to lifestyle changes recommended.   -- Periodic follow ups for eye evaluations, foot care and dental care suggested.    Will get eye records from [facility]       Type 2 diabetes mellitus with polyneuropathy  Optimize BG readings.   See above.     Educated patient to check feet daily for any foreign objects and/or wounds. Discussed with patient the importance of wearing appropriate  footwear at all times, not to walk barefoot ever, and to check shoes before putting them on feet. Instructed patient to keep feet dry by regularly changing shoes and socks and drying feet after baths and exercises. Also, instructed patient to report any new lesions, discolorations, or swelling to a healthcare professional.        Benign essential HTN  BP goal is < 140/90.   Not currently on ACEi or ARB. We will check a urine MAC today and consult with cardiology   Controlled   Blood pressure goals discussed with patient      CKD (chronic kidney disease), stage III  Optimize BG readings.   See above.   Avoid insulin stacking   Monitor Metformin with GFR     Mixed hyperlipidemia  On statin per ADA recommendations  LDL goal < 70 (due to cardiac hx). LDL at goal. LFTs WNL. Continue statin.         Coronary artery disease involving native coronary artery of native heart without angina pectoris  Avoid hypoglycemia   Continue to follow with cardiology     Overweight (BMI 25.0-29.9)  Body mass index is 28.26 kg/m².  Increases insulin resistance.   Discussed DM diet and exercise.           Follow-up in about 3 months (around 6/14/2019).  He will get his labs with Dr. Hurley and bring me a copy.       Recommendations were discussed with the patient in detail  The patient verbalized understanding and agrees with the plan outlined as above.     Orders Placed This Encounter   Procedures    Hemoglobin A1c     Standing Status:   Future     Standing Expiration Date:   5/12/2020    Microalbumin/creatinine urine ratio     Standing Status:   Future     Number of Occurrences:   1     Standing Expiration Date:   5/12/2020     Order Specific Question:   Specimen Source     Answer:   Urine    Comprehensive metabolic panel     Standing Status:   Future     Standing Expiration Date:   5/12/2020

## 2019-03-14 NOTE — ASSESSMENT & PLAN NOTE
On statin per ADA recommendations  LDL goal < 70 (due to cardiac hx). LDL at goal. LFTs WNL. Continue statin.

## 2019-03-14 NOTE — ASSESSMENT & PLAN NOTE
Body mass index is 28.26 kg/m².  Increases insulin resistance.   Discussed DM diet and exercise.

## 2019-03-14 NOTE — ASSESSMENT & PLAN NOTE
BP goal is < 140/90.   Not currently on ACEi or ARB. We will check a urine MAC today and consult with cardiology   Controlled   Blood pressure goals discussed with patient

## 2019-03-14 NOTE — ASSESSMENT & PLAN NOTE
Optimize BG readings.   See above.     Educated patient to check feet daily for any foreign objects and/or wounds. Discussed with patient the importance of wearing appropriate footwear at all times, not to walk barefoot ever, and to check shoes before putting them on feet. Instructed patient to keep feet dry by regularly changing shoes and socks and drying feet after baths and exercises. Also, instructed patient to report any new lesions, discolorations, or swelling to a healthcare professional.

## 2019-03-15 ENCOUNTER — TELEPHONE (OUTPATIENT)
Dept: ENDOCRINOLOGY | Facility: CLINIC | Age: 69
End: 2019-03-15

## 2019-04-22 DIAGNOSIS — Z79.4 TYPE 2 DIABETES MELLITUS WITH HYPERGLYCEMIA, WITH LONG-TERM CURRENT USE OF INSULIN: ICD-10-CM

## 2019-04-22 DIAGNOSIS — E11.65 TYPE 2 DIABETES MELLITUS WITH HYPERGLYCEMIA, WITH LONG-TERM CURRENT USE OF INSULIN: ICD-10-CM

## 2019-04-22 NOTE — TELEPHONE ENCOUNTER
----- Message from Nisha Villeda sent at 4/22/2019  3:40 PM CDT -----  Contact: Wife  Type:  RX Refill Request    Who Called:  Swati, wife  Refill or New Rx:  Refill  RX Name and Strength:  insulin detemir U-100 (LEVEMIR FLEXTOUCH) 100 unit/mL (3 mL) SubQ InPn pen  How is the patient currently taking it? (ex. 1XDay):  Inject 30 Units into the skin once daily  Is this a 30 day or 90 day RX:  1 box  Preferred Pharmacy with phone number:    [id number] [address] [address]  Phone: [phone] Fax: [phone]  Local or Mail Order:  Local  Ordering Provider:  Orquidea Zamarripa NP  Best Call Back Number:  [phone]  Additional Information:  Please advise thanks.

## 2019-04-24 DIAGNOSIS — E11.65 TYPE 2 DIABETES MELLITUS WITH HYPERGLYCEMIA, WITH LONG-TERM CURRENT USE OF INSULIN: Primary | ICD-10-CM

## 2019-04-24 DIAGNOSIS — Z79.4 TYPE 2 DIABETES MELLITUS WITH HYPERGLYCEMIA, WITH LONG-TERM CURRENT USE OF INSULIN: Primary | ICD-10-CM

## 2019-05-16 ENCOUNTER — TELEPHONE (OUTPATIENT)
Dept: ENDOCRINOLOGY | Facility: CLINIC | Age: 69
End: 2019-05-16

## 2019-06-21 ENCOUNTER — OFFICE VISIT (OUTPATIENT)
Dept: DIABETES | Facility: CLINIC | Age: 69
End: 2019-06-21
Payer: COMMERCIAL

## 2019-06-21 VITALS
DIASTOLIC BLOOD PRESSURE: 50 MMHG | WEIGHT: 199.38 LBS | HEART RATE: 75 BPM | SYSTOLIC BLOOD PRESSURE: 110 MMHG | HEIGHT: 72 IN | BODY MASS INDEX: 27.01 KG/M2

## 2019-06-21 DIAGNOSIS — N18.30 CKD (CHRONIC KIDNEY DISEASE), STAGE III: ICD-10-CM

## 2019-06-21 DIAGNOSIS — E66.3 OVERWEIGHT (BMI 25.0-29.9): ICD-10-CM

## 2019-06-21 DIAGNOSIS — E11.65 TYPE 2 DIABETES MELLITUS WITH HYPERGLYCEMIA, WITH LONG-TERM CURRENT USE OF INSULIN: Primary | ICD-10-CM

## 2019-06-21 DIAGNOSIS — Z79.4 TYPE 2 DIABETES MELLITUS WITH HYPERGLYCEMIA, WITH LONG-TERM CURRENT USE OF INSULIN: Primary | ICD-10-CM

## 2019-06-21 DIAGNOSIS — I10 BENIGN ESSENTIAL HTN: ICD-10-CM

## 2019-06-21 DIAGNOSIS — E78.2 MIXED HYPERLIPIDEMIA: ICD-10-CM

## 2019-06-21 DIAGNOSIS — E11.42 TYPE 2 DIABETES MELLITUS WITH POLYNEUROPATHY: ICD-10-CM

## 2019-06-21 DIAGNOSIS — I25.10 CORONARY ARTERY DISEASE INVOLVING NATIVE CORONARY ARTERY OF NATIVE HEART WITHOUT ANGINA PECTORIS: ICD-10-CM

## 2019-06-21 PROCEDURE — 99214 OFFICE O/P EST MOD 30 MIN: CPT | Mod: S$GLB,,, | Performed by: NURSE PRACTITIONER

## 2019-06-21 PROCEDURE — 99214 PR OFFICE/OUTPT VISIT, EST, LEVL IV, 30-39 MIN: ICD-10-PCS | Mod: S$GLB,,, | Performed by: NURSE PRACTITIONER

## 2019-06-21 PROCEDURE — 99999 PR PBB SHADOW E&M-EST. PATIENT-LVL III: ICD-10-PCS | Mod: PBBFAC,,, | Performed by: NURSE PRACTITIONER

## 2019-06-21 PROCEDURE — 99999 PR PBB SHADOW E&M-EST. PATIENT-LVL III: CPT | Mod: PBBFAC,,, | Performed by: NURSE PRACTITIONER

## 2019-06-21 PROCEDURE — 99213 OFFICE O/P EST LOW 20 MIN: CPT | Mod: PBBFAC,PN | Performed by: NURSE PRACTITIONER

## 2019-06-21 RX ORDER — METFORMIN HYDROCHLORIDE 500 MG/1
1000 TABLET, EXTENDED RELEASE ORAL 2 TIMES DAILY WITH MEALS
Qty: 360 TABLET | Refills: 1 | Status: SHIPPED | OUTPATIENT
Start: 2019-06-21 | End: 2020-05-01

## 2019-06-21 NOTE — ASSESSMENT & PLAN NOTE
Optimize BG readings.     Educated patient to check feet daily for any foreign objects and/or wounds. Discussed with patient the importance of wearing appropriate footwear at all times, not to walk barefoot ever, and to check shoes before putting them on feet. Instructed patient to keep feet dry by regularly changing shoes and socks and drying feet after baths and exercises. Also, instructed patient to report any new lesions, discolorations, or swelling to a healthcare professional.

## 2019-06-21 NOTE — ASSESSMENT & PLAN NOTE
Controlled. A1c at goal. No hypoglycemia.   Medication changes:   Change Metformin to XR to reduce weekly diarrhea.  Metformin XR 1000 mg BID   Continue Levemir 30 units     -- Reviewed goals of therapy are to get the best control we can without hypoglycemia  -- Reviewed patient's current insulin regimen. Clarified proper insulin dose and timing in relation to meals, etc. Insulin injection sites and proper rotation instructed.    -- Advised frequent self blood glucose monitoring.  Patient encouraged to document glucose results and bring them to every clinic visit    -- Hypoglycemia precautions discussed. Instructed on precautions before driving.    -- Call for Bg repeatedly < 90 or > 180.   -- Close adherence to lifestyle changes recommended.   -- Periodic follow ups for eye evaluations, foot care and dental care suggested.

## 2019-06-21 NOTE — ASSESSMENT & PLAN NOTE
BP goal is < 140/90.   Controlled   Blood pressure goals discussed with patient  Defer management to cardiology

## 2019-06-21 NOTE — ASSESSMENT & PLAN NOTE
Optimize BG readings.   See above.   Avoid insulin stacking   Monitor Metformin with GFR - current GFR- 56 and creatinine 1.3

## 2019-06-21 NOTE — PROGRESS NOTES
CC:   Chief Complaint   Patient presents with    Diabetes Mellitus     type 2 f/u         HPI: Mikie Walter Jr. is a 68 y.o. male presents for a follow up visit today for the management of T2DM.   The patient was diagnosed with Type 2 diabetes in 1990's on routine lab work. He was initially started on Metformin. Insulin therapy was started in July 2017 when he was hospitalized for esophageal CA.      Family hx of diabetes: father side of the family.   Hospitalized for diabetes: denies     No personal or FH of thyroid cancer or personal of pancreatic cancer or pancreatitis.     He has a hx of kidney cancer s/p left nephrectomy in 11/2016 and esophageal adenocarcinoma, diagnosed in 10/2016. He is s/p esophagectomy in July 2017. He underwent chemotherapy and radiation. They found 2 more spots on his lung recently and received a couple rounds of radiation but is now just being monitored. He follows with Dr. Bunn at [facility].     He is doing well post esophagectomy. He is no longer on TF and has no problem swallowing pills.     Dr. Hurley is his cardiologist and manages a lot of his PCP needs.     At his last visit, we restarted Metformin and changed Levemir from BID dosing to daily dosing. His A1c decreased from 7.8% to 6.6%. He reports diarrhea weekly with the Metformin.    DIABETES COMPLICATIONS: peripheral neuropathy and cardiovascular disease  CAD, S/p CABG x3 in 2011    Diabetes Management Status    ASA:  Yes - 81 mg daily     Diabetes Management Status    Statin: Taking  ACE/ARB: Not taking    Screening or Prevention Patient's value Goal Complete/Controlled?   HgA1C Testing and Control   Lab Results   Component Value Date    HGBA1C 6.3 (H) 07/18/2017      Annually/Less than 8% No   Lipid profile : 01/22/2019 Annually No   LDL control No results found for: LDLCALC Annually/Less than 100 mg/dl  No   Nephropathy screening Lab Results   Component Value Date    LABMICR 6.0 03/14/2019     No results found for:  PROTEINUA Annually Yes   Blood pressure BP Readings from Last 1 Encounters:   06/21/19 (!) 110/50    Less than 140/90 Yes   Dilated retinal exam : 11/05/2018 Annually Yes   Foot exam   : 03/14/2019 Annually Yes     Outpatient labs                  GOAL A1C: 7% with out hypoglycemia.     DM MEDICATIONS USED IN THE PAST: Metformin  victoza- readings had improved. Levemir, Kombiglyze      CURRENT DIABETES MEDICATIONS: Metformin 1000 mg BID and Levemir 30 units daily (6-7AM)   Insulin: pens.    Missed doses: rarely missing doses.      BLOOD GLUCOSE MONITORING: checks daily in the AM   If high he will recheck in the AM   BG was 92 this AM   ~100-115.      HYPOGLYCEMIA:  No- nothing below 90.     MEALS: eating 3 meals per day   BF: yogurt and glass of milk; sometimes egg with toast, grits  Lunch: sandwich- eats at home or left overs. Soup.   Dinner: roast and yellow squash. Red beans and rice.   Snack: nuts, peanut butter   Drinks: whole milk, coke zero, crystal light tea.      CURRENT EXERCISE:  No    Review of Systems  Review of Systems   Constitutional: Negative for appetite change and fatigue.   HENT: Negative for trouble swallowing.    Eyes: Negative for visual disturbance.   Respiratory: Negative for shortness of breath.    Cardiovascular: Negative for chest pain.   Gastrointestinal: Positive for diarrhea (weekly with Metformin ). Negative for nausea.   Endocrine: Negative for polydipsia, polyphagia and polyuria.   Genitourinary:        No Nocturia    Skin: Negative for wound.   Neurological: Negative for numbness.       Physical Exam   Physical Exam   Constitutional: He is oriented to person, place, and time. He appears well-developed and well-nourished. No distress.   HENT:   Head: Normocephalic and atraumatic.   Right Ear: External ear normal.   Left Ear: External ear normal.   Nose: Nose normal.   Neck: Normal range of motion. Neck supple. Normal carotid pulses present. Carotid bruit is not present. No tracheal  deviation present. No thyromegaly present.       Cardiovascular: Normal rate and regular rhythm.   No murmur heard.  Pulmonary/Chest: Effort normal and breath sounds normal. No respiratory distress.   Abdominal: Soft. There is no tenderness. No hernia.   Musculoskeletal: He exhibits no edema.   Neurological: He is alert and oriented to person, place, and time. No cranial nerve deficit.   Skin: Skin is warm and dry. Capillary refill takes less than 2 seconds. No rash noted. He is not diaphoretic.   Injection sites are normal appearing. No lipo hypertropthy or atrophy     Psychiatric: He has a normal mood and affect. His behavior is normal. Judgment normal.   Nursing note and vitals reviewed.      FOOT EXAMINATION: appropriate footwear.       No results found for: TSH        Type 2 diabetes mellitus with hyperglycemia, with long-term current use of insulin  Controlled. A1c at goal. No hypoglycemia.   Medication changes:   Change Metformin to XR to reduce weekly diarrhea.  Metformin XR 1000 mg BID   Continue Levemir 30 units     -- Reviewed goals of therapy are to get the best control we can without hypoglycemia  -- Reviewed patient's current insulin regimen. Clarified proper insulin dose and timing in relation to meals, etc. Insulin injection sites and proper rotation instructed.    -- Advised frequent self blood glucose monitoring.  Patient encouraged to document glucose results and bring them to every clinic visit    -- Hypoglycemia precautions discussed. Instructed on precautions before driving.    -- Call for Bg repeatedly < 90 or > 180.   -- Close adherence to lifestyle changes recommended.   -- Periodic follow ups for eye evaluations, foot care and dental care suggested.          Type 2 diabetes mellitus with polyneuropathy  Optimize BG readings.     Educated patient to check feet daily for any foreign objects and/or wounds. Discussed with patient the importance of wearing appropriate footwear at all times, not  to walk barefoot ever, and to check shoes before putting them on feet. Instructed patient to keep feet dry by regularly changing shoes and socks and drying feet after baths and exercises. Also, instructed patient to report any new lesions, discolorations, or swelling to a healthcare professional.        Benign essential HTN  BP goal is < 140/90.   Controlled   Blood pressure goals discussed with patient  Defer management to cardiology       CKD (chronic kidney disease), stage III  Optimize BG readings.   See above.   Avoid insulin stacking   Monitor Metformin with GFR - current GFR- 56 and creatinine 1.3    Mixed hyperlipidemia  On statin per ADA recommendations  LDL goal < 70 (due to cardiac hx). LDL at goal. LFTs WNL. Continue statin.         Coronary artery disease involving native coronary artery of native heart without angina pectoris  Avoid hypoglycemia   Continue to follow with cardiology     Overweight (BMI 25.0-29.9)  Body mass index is 27.04 kg/m².  Increases insulin resistance.   Discussed DM diet and exercise.           Follow up in about 6 months (around 12/21/2019).  He will get his labs with Dr. Hurley and bring me a copy.       Recommendations were discussed with the patient in detail  The patient verbalized understanding and agrees with the plan outlined as above.     No orders of the defined types were placed in this encounter.

## 2019-06-21 NOTE — ASSESSMENT & PLAN NOTE
Body mass index is 27.04 kg/m².  Increases insulin resistance.   Discussed DM diet and exercise.

## 2019-09-16 RX ORDER — PANTOPRAZOLE SODIUM 40 MG/1
40 TABLET, DELAYED RELEASE ORAL DAILY
Qty: 30 TABLET | Refills: 11 | Status: ON HOLD | OUTPATIENT
Start: 2019-09-16 | End: 2023-02-24 | Stop reason: HOSPADM

## 2019-10-17 LAB — HBA1C MFR BLD: 6.9 %

## 2019-10-23 ENCOUNTER — TELEPHONE (OUTPATIENT)
Dept: DIABETES | Facility: CLINIC | Age: 69
End: 2019-10-23

## 2019-10-23 ENCOUNTER — TELEPHONE (OUTPATIENT)
Dept: PRIMARY CARE CLINIC | Facility: CLINIC | Age: 69
End: 2019-10-23

## 2019-10-23 NOTE — TELEPHONE ENCOUNTER
Spoke with the pt giving him his outside lab records. A1C is now 6.8% and all other labs are with in normal limits.  Pt verbalized his understanding. All questions were answered. And there were no concerns.

## 2019-10-23 NOTE — TELEPHONE ENCOUNTER
Please call patient on lab know that I received his labs from his Cardiology office.  A1c is at 6.9%.  This is fantastic!!!  All other labs were within normal limits.  Please let me know if he has any questions or concerns.

## 2019-12-12 ENCOUNTER — TELEPHONE (OUTPATIENT)
Dept: DIABETES | Facility: CLINIC | Age: 69
End: 2019-12-12

## 2019-12-12 NOTE — TELEPHONE ENCOUNTER
Patients wife requested that I call her about questions regarding Metformin.   He has been only taking 1000 mg daily due to diarrhea.   He is still having some diarrhea with cutting back on the Metformin. He would like to trial off the Metformin and see how he does.   Discussed letting me know if BG readings are consistently > 150   Encouraged to check BG more frequently with stopping the Metformin  V/u.   All questions answered.

## 2020-03-06 DIAGNOSIS — E11.65 TYPE 2 DIABETES MELLITUS WITH HYPERGLYCEMIA, WITH LONG-TERM CURRENT USE OF INSULIN: ICD-10-CM

## 2020-03-06 DIAGNOSIS — Z79.4 TYPE 2 DIABETES MELLITUS WITH HYPERGLYCEMIA, WITH LONG-TERM CURRENT USE OF INSULIN: ICD-10-CM

## 2020-03-10 RX ORDER — INSULIN DETEMIR 100 [IU]/ML
INJECTION, SOLUTION SUBCUTANEOUS
Qty: 9 ML | Refills: 1 | Status: SHIPPED | OUTPATIENT
Start: 2020-03-10 | End: 2020-05-01

## 2020-04-30 ENCOUNTER — TELEPHONE (OUTPATIENT)
Dept: DIABETES | Facility: CLINIC | Age: 70
End: 2020-04-30

## 2020-04-30 NOTE — TELEPHONE ENCOUNTER
Spoke with the patient about his getting setup with [facility]. I assisted with walking the patient through the process of pre check in. He understands how to log in to the appointment for tomorrow. No further issues discussed.

## 2020-04-30 NOTE — TELEPHONE ENCOUNTER
----- Message from Alanna Burns sent at 4/30/2020  1:10 PM CDT -----  Contact: pt  Pt need some info regarding vv tomorrow. Pt would like for Carly to give him a call back at [phone] .

## 2020-05-01 ENCOUNTER — OFFICE VISIT (OUTPATIENT)
Dept: DIABETES | Facility: CLINIC | Age: 70
End: 2020-05-01
Payer: COMMERCIAL

## 2020-05-01 VITALS — WEIGHT: 186 LBS | BODY MASS INDEX: 25.23 KG/M2

## 2020-05-01 DIAGNOSIS — Z79.4 TYPE 2 DIABETES MELLITUS WITH DIABETIC POLYNEUROPATHY, WITH LONG-TERM CURRENT USE OF INSULIN: Primary | ICD-10-CM

## 2020-05-01 DIAGNOSIS — E78.2 MIXED HYPERLIPIDEMIA: ICD-10-CM

## 2020-05-01 DIAGNOSIS — I25.10 CORONARY ARTERY DISEASE INVOLVING NATIVE CORONARY ARTERY OF NATIVE HEART WITHOUT ANGINA PECTORIS: ICD-10-CM

## 2020-05-01 DIAGNOSIS — I10 BENIGN ESSENTIAL HTN: ICD-10-CM

## 2020-05-01 DIAGNOSIS — E11.42 TYPE 2 DIABETES MELLITUS WITH DIABETIC POLYNEUROPATHY, WITH LONG-TERM CURRENT USE OF INSULIN: Primary | ICD-10-CM

## 2020-05-01 DIAGNOSIS — E11.65 TYPE 2 DIABETES MELLITUS WITH HYPERGLYCEMIA, WITH LONG-TERM CURRENT USE OF INSULIN: ICD-10-CM

## 2020-05-01 DIAGNOSIS — N18.30 CKD (CHRONIC KIDNEY DISEASE), STAGE III: ICD-10-CM

## 2020-05-01 DIAGNOSIS — Z79.4 TYPE 2 DIABETES MELLITUS WITH HYPERGLYCEMIA, WITH LONG-TERM CURRENT USE OF INSULIN: ICD-10-CM

## 2020-05-01 PROCEDURE — 1159F MED LIST DOCD IN RCRD: CPT | Mod: ,,, | Performed by: NURSE PRACTITIONER

## 2020-05-01 PROCEDURE — 99214 OFFICE O/P EST MOD 30 MIN: CPT | Mod: 95,,, | Performed by: NURSE PRACTITIONER

## 2020-05-01 PROCEDURE — 99214 PR OFFICE/OUTPT VISIT, EST, LEVL IV, 30-39 MIN: ICD-10-PCS | Mod: 95,,, | Performed by: NURSE PRACTITIONER

## 2020-05-01 PROCEDURE — 3044F HG A1C LEVEL LT 7.0%: CPT | Mod: CPTII,,, | Performed by: NURSE PRACTITIONER

## 2020-05-01 PROCEDURE — 3044F PR MOST RECENT HEMOGLOBIN A1C LEVEL <7.0%: ICD-10-PCS | Mod: CPTII,,, | Performed by: NURSE PRACTITIONER

## 2020-05-01 PROCEDURE — 1159F PR MEDICATION LIST DOCUMENTED IN MEDICAL RECORD: ICD-10-PCS | Mod: ,,, | Performed by: NURSE PRACTITIONER

## 2020-05-01 NOTE — PROGRESS NOTES
The patient location is: Home   The chief complaint leading to consultation is: Diabetes Management- follow up   Visit type: audiovisual  Total time spent with patient: 15 minutes   Each patient to whom he or she provides medical services by telemedicine is:  (1) informed of the relationship between the physician and patient and the respective role of any other health care provider with respect to management of the patient; and (2) notified that he or she may decline to receive medical services by telemedicine and may withdraw from such care at any time.    Notes:       CC:   Chief Complaint   Patient presents with    Diabetes Mellitus     virtual visit follow up         HPI: Mikie Walter Jr. is a 69 y.o. male presents for a follow up visit today for the management of T2DM.   The patient was diagnosed with Type 2 diabetes in 1990's on routine lab work. He was initially started on Metformin. Insulin therapy was started in July 2017 when he was hospitalized for esophageal CA.    Since our last visit in June 2019 he has stopped the metformin as he is suffering with diarrhea.  He reports since stopping the Metformin the diarrhea has continued - likely due to chemotherapy medications.  He has also cut back on his basal insulin. He reports his blood sugars dropping into the 60s so he has decided to stop his Levemir.  He reports that he notices 1-2 days following chemotherapy that his blood sugars will run in the 140s to 150s and he will administer Levemir 10-15 units.  But for the most part he has completely stopped the Levemir.  He reports his blood sugars are mostly running 's.  No hypoglycemia since stopping the Levemir.    He has a hx of kidney cancer s/p left nephrectomy in 11/2016 and esophageal adenocarcinoma, diagnosed in 10/2016. He is s/p esophagectomy in July 2017. He underwent chemotherapy and radiation. They found 2 more spots on his lung in 2019 and now he is back on chemotherapy.  He is getting  chemotherapy every 3 weeks.  He follows with Dr. Bunn at [facility].     He is due for lab work/ A1c. He is getting labs done next week at [facility] with A1c level and will send me the results.       Family hx of diabetes: father side of the family.   Hospitalized for diabetes: denies     No personal or FH of thyroid cancer or personal of pancreatic cancer or pancreatitis.     Dr. Hurley is his cardiologist and manages a lot of his PCP needs.       DIABETES COMPLICATIONS: peripheral neuropathy and cardiovascular disease  CAD, S/p CABG x3 in 2011    Diabetes Management Status    ASA:  Yes - 81 mg daily     Diabetes Management Status    Statin: Taking  ACE/ARB: Not taking    Screening or Prevention Patient's value Goal Complete/Controlled?   HgA1C Testing and Control   Lab Results   Component Value Date    HGBA1C 6.9 10/17/2019      Annually/Less than 8% No   Lipid profile : 01/22/2019 Annually No   LDL control No results found for: LDLCALC Annually/Less than 100 mg/dl  No   Nephropathy screening Lab Results   Component Value Date    LABMICR 6.0 03/14/2019     No results found for: PROTEINUA Annually Yes   Blood pressure BP Readings from Last 1 Encounters:   06/21/19 (!) 110/50    Less than 140/90 Yes   Dilated retinal exam : 11/05/2018 Annually Yes   Foot exam   : 03/14/2019 Annually Yes       GOAL A1C: 7% with out hypoglycemia.     DM MEDICATIONS USED IN THE PAST: Metformin-- diarrhea    victoza- readings had improved. Shekhar Mora,        CURRENT DIABETES MEDICATIONS:  Levemir 10-15 units if BG is  140 or >   Insulin: pens.         BLOOD GLUCOSE MONITORING:  Checking at least 1 time per day   Per oral recall: 's   BG today was 90-- and he had banana and whole milk -- no insulin today.   140-150occ-- messed up the night before - ate too many cookies or following chemo      HYPOGLYCEMIA:  Previously having BG in the 60's until he started cutting back. None since cutting back on his       MEALS: eating 3  meals per day   Snack: nuts, peanut butter -- occ cookies.   Drinks: whole milk, coke zero, crystal light tea.      CURRENT EXERCISE:  No    Review of Systems  Review of Systems   Constitutional: Positive for unexpected weight change. Negative for appetite change and fatigue.   HENT: Negative for trouble swallowing.    Eyes: Negative for visual disturbance.   Respiratory: Negative for shortness of breath.    Cardiovascular: Negative for chest pain.   Gastrointestinal: Positive for diarrhea (even continued with Metformin being discontinued ). Negative for nausea.   Endocrine: Negative for polydipsia, polyphagia and polyuria.   Genitourinary:        No Nocturia    Skin: Negative for wound.   Neurological: Negative for numbness.       Physical Exam   Physical Exam   Constitutional: He is oriented to person, place, and time. He appears well-developed and well-nourished. No distress.   HENT:   Head: Normocephalic.   Neck: Normal range of motion.   Pulmonary/Chest: Effort normal.   Neurological: He is alert and oriented to person, place, and time.   Psychiatric: He has a normal mood and affect. His behavior is normal. Judgment and thought content normal.       FOOT EXAMINATION: appropriate footwear.       No results found for: TSH        Type 2 diabetes mellitus with diabetic polyneuropathy, with long-term current use of insulin  Controlled based on oral recall of BG readings.   Due for A1c and will send me the results in the next 1-2 weeks.   Based on oral recall: I am ok with stopping all DM medications as it sounds that he is controlled off medications now- possibly from unintentional weight loss   BG readings likely run in the 140-150- following chemo due to steroids. Ok to give Levemir 10 units if BG > 140-150's following chemo x 1-2 days.   Continue to check BG daily at alternating times. Check more frequently if BG readings are running in the 150's   Notify me if BG readings are consistently 150's or higher.      Avoiding Metformin due to diarrhea     Benign essential HTN  BP goal is < 140/90.   Blood pressure goals discussed with patient  Defer management to cardiology       CKD (chronic kidney disease), stage III  Optimize BG readings.       Mixed hyperlipidemia  On statin per ADA recommendations  LDL goal < 70 (due to cardiac hx). LDL at goal. LFTs WNL. Continue statin.         Coronary artery disease involving native coronary artery of native heart without angina pectoris  Avoid hypoglycemia   Continue to follow with cardiology         Follow up in about 6 months (around 11/1/2020).  Labs per Dr. Hurley     Recommendations were discussed with the patient in detail  The patient verbalized understanding and agrees with the plan outlined as above.     No orders of the defined types were placed in this encounter.

## 2020-05-01 NOTE — ASSESSMENT & PLAN NOTE
Controlled based on oral recall of BG readings.   Due for A1c and will send me the results in the next 1-2 weeks.   Based on oral recall: I am ok with stopping all DM medications as it sounds that he is controlled off medications now- possibly from unintentional weight loss   BG readings likely run in the 140-150- following chemo due to steroids. Ok to give Levemir 10 units if BG > 140-150's following chemo x 1-2 days.   Continue to check BG daily at alternating times. Check more frequently if BG readings are running in the 150's   Notify me if BG readings are consistently 150's or higher.     Avoiding Metformin due to diarrhea

## 2020-05-01 NOTE — ASSESSMENT & PLAN NOTE
BP goal is < 140/90.   Blood pressure goals discussed with patient  Defer management to cardiology

## 2022-08-22 NOTE — CONSULTS
[facility]  Endocrinology  Diabetes Consult Note    Consult Requested by: George Padilla MD   Reason for admit: Malignant neoplasm of lower third of esophagus    HISTORY OF PRESENT ILLNESS:  Reason for Consult: Management of T2DM, Hyperglycemia     Surgical Procedure and Date: 7/18/17    Diabetes diagnosis year: . 20 years    Home Diabetes Medications:  Kombiglyze and Victoza stopped 1 week prior to admission    How often checking glucose at home? One   BG readings on regimen: 170-190s  Hypoglycemia on the regimen?  No  Missed doses on regimen?  No    Diabetes Complications include:   Hyperglycemia and peripheral neuropathy    Complicating diabetes co morbidities: Active Cancer    HPI:  Mr. Walter is a 66 y.o. male with a history of T2DM, HTN, CAD s/p CABG x 3 in 2011 with subsequent stent placement for bypass failure, kidney cancer s/p left nephrectomy in 11/2016, chronic GERD, and esophageal adenocarcinoma, diagnosed in 10/2016. He is s/p neoadjuvant chemoradiation, last dose of radiation on 4/25/17. He first presented back in 10/2016 with c/o 2 months of progressive dysphagia. EGD/EUS at that time revealed a tumor at the esophagogastric junction, staged T2 by ultrasound. He is now s/p esophagectomy. Endocrine consulted for bg management.         Interval HPI:    insulin gtt infusing, no hypoglycemia, bg at or near goal, NPO    /75 (BP Location: Left arm, Patient Position: Lying, BP Method: Automatic)   Pulse 68   Temp 98.2 °F (36.8 °C) (Oral)   Resp (!) 3   Ht 6' (1.829 m)   Wt 109.3 kg (241 lb)   SpO2 100%   BMI 32.69 kg/m²      Labs Reviewed and Include      Recent Labs  Lab 07/19/17  0415   *   CALCIUM 7.9*   ALBUMIN 2.7*   PROT 5.4*   *   K 4.6   CO2 20*      BUN 18   CREATININE 1.7*   ALKPHOS 47*   *   *   BILITOT 0.6     Lab Results   Component Value Date    WBC 5.64 07/19/2017    HGB 12.3 (L) 07/19/2017    HCT 35.2 (L) 07/19/2017    MCV 92  Pt admitted for ESRD, noncompliance with dialysis as outpatient 07/19/2017     (L) 07/19/2017     No results for input(s): TSH, FREET4 in the last 168 hours.  Lab Results   Component Value Date    HGBA1C 6.3 (H) 07/18/2017       Nutritional status:   Body mass index is 32.69 kg/m².  Lab Results   Component Value Date    ALBUMIN 2.7 (L) 07/19/2017    ALBUMIN 3.0 (L) 07/18/2017    ALBUMIN 4.0 07/10/2017     Lab Results   Component Value Date    PREALBUMIN 34 07/10/2017       Estimated Creatinine Clearance: 53.9 mL/min (based on Cr of 1.7).    Accu-Checks  Recent Labs      07/18/17   2201  07/18/17   2303  07/19/17   0002  07/19/17   0055  07/19/17   0203  07/19/17   0311  07/19/17   0400  07/19/17   0504  07/19/17   0559  07/19/17   0802   POCTGLUCOSE  112*  147*  147*  171*  172*  184*  182*  186*  190*  192*       Current Medications and/or Treatments Impacting Glycemic Control  Immunotherapy:  Immunosuppressants     None        Steroids:   Hormones     None        Pressors:    Autonomic Drugs     None        Hyperglycemia/Diabetes Medications: Antihyperglycemics     Start     Stop Route Frequency Ordered    07/18/17 1915  insulin regular (Humulin R) 100 Units in sodium chloride 0.9% 100 mL infusion      -- IV Continuous 07/18/17 1822          Labs Reviewed and Include     Recent Labs  Lab 07/19/17  0415   *   CALCIUM 7.9*   ALBUMIN 2.7*   PROT 5.4*   *   K 4.6   CO2 20*      BUN 18   CREATININE 1.7*   ALKPHOS 47*   *   *   BILITOT 0.6     Lab Results   Component Value Date    WBC 5.64 07/19/2017    HGB 12.3 (L) 07/19/2017    HCT 35.2 (L) 07/19/2017    MCV 92 07/19/2017     (L) 07/19/2017     No results for input(s): TSH, FREET4 in the last 168 hours.  Lab Results   Component Value Date    HGBA1C 6.3 (H) 07/18/2017       Nutritional status:   Body mass index is 32.69 kg/m².  Lab Results   Component Value Date    ALBUMIN 2.7 (L) 07/19/2017    ALBUMIN 3.0 (L) 07/18/2017    ALBUMIN 4.0 07/10/2017     Lab Results   Component Value Date     PREALBUMIN 34 07/10/2017       Estimated Creatinine Clearance: 53.9 mL/min (based on Cr of 1.7).    Accu-Checks  Recent Labs      07/18/17   2201  07/18/17   2303  07/19/17   0002  07/19/17   0055  07/19/17   0203  07/19/17   0311  07/19/17   0400  07/19/17   0504  07/19/17   0559  07/19/17   0802   POCTGLUCOSE  112*  147*  147*  171*  172*  184*  182*  186*  190*  192*        ASSESSMENT and PLAN    Type 2 diabetes mellitus with polyneuropathy    bg goal 140-180- Start insulin transition gtt at 0.4 u/h w q4 bg monitoring and low dose correction    Discharge planning- on-going          CKD (chronic kidney disease), stage III    H/o nephrectomy  R/t cancer  Estimated Creatinine Clearance: 53.9 mL/min (based on Cr of 1.7).  Avoid hypoglycemia           H/O esophagectomy    Per surgery          Coronary artery disease involving native coronary artery    Avoid hypoglycemia          * Malignant neoplasm of lower third of esophagus    S/p esophagectomy 7/18/17              Plan discussed with patient, family, and RN at bedside.     Yadira Freedman, DNP, NP  Endocrinology  [facility]   Pt admitted for ESRD

## 2023-01-05 ENCOUNTER — HOSPITAL ENCOUNTER (EMERGENCY)
Facility: OTHER | Age: 73
Discharge: HOME | End: 2023-01-06
Attending: EMERGENCY MEDICINE
Payer: MEDICARE

## 2023-01-05 DIAGNOSIS — J10.1 INFLUENZA B: Primary | ICD-10-CM

## 2023-01-05 DIAGNOSIS — J11.00: ICD-10-CM

## 2023-01-05 DIAGNOSIS — R05.9 COUGH: ICD-10-CM

## 2023-01-05 DIAGNOSIS — S63.201A: ICD-10-CM

## 2023-01-05 PROBLEM — Z79.01 CHRONIC ANTICOAGULATION: Status: ACTIVE | Noted: 2023-01-05

## 2023-01-05 PROBLEM — C64.9 RENAL CELL CARCINOMA: Status: ACTIVE | Noted: 2023-01-05

## 2023-01-05 PROBLEM — C15.5 MALIGNANT NEOPLASM OF LOWER THIRD OF ESOPHAGUS: Status: RESOLVED | Noted: 2017-06-19 | Resolved: 2023-01-05

## 2023-01-05 LAB
ALBUMIN SERPL BCP-MCNC: 2.1 G/DL (ref 3.5–5.2)
ALP SERPL-CCNC: 74 U/L (ref 55–135)
ALT SERPL W/O P-5'-P-CCNC: 15 U/L (ref 10–44)
ANION GAP SERPL CALC-SCNC: 12 MMOL/L (ref 8–16)
AST SERPL-CCNC: 28 U/L (ref 10–40)
BASOPHILS # BLD AUTO: 0.02 K/UL (ref 0–0.2)
BASOPHILS NFR BLD: 0.2 % (ref 0–1.9)
BILIRUB SERPL-MCNC: 0.5 MG/DL (ref 0.1–1)
BUN SERPL-MCNC: 17 MG/DL (ref 8–23)
CALCIUM SERPL-MCNC: 8.1 MG/DL (ref 8.7–10.5)
CHLORIDE SERPL-SCNC: 95 MMOL/L (ref 95–110)
CO2 SERPL-SCNC: 17 MMOL/L (ref 23–29)
CREAT SERPL-MCNC: 0.9 MG/DL (ref 0.5–1.4)
CTP QC/QA: YES
CTP QC/QA: YES
D DIMER PPP IA.FEU-MCNC: 2.01 MG/L FEU
DIFFERENTIAL METHOD: ABNORMAL
EOSINOPHIL # BLD AUTO: 0 K/UL (ref 0–0.5)
EOSINOPHIL NFR BLD: 0.3 % (ref 0–8)
ERYTHROCYTE [DISTWIDTH] IN BLOOD BY AUTOMATED COUNT: 13 % (ref 11.5–14.5)
EST. GFR  (NO RACE VARIABLE): >60 ML/MIN/1.73 M^2
GLUCOSE SERPL-MCNC: 111 MG/DL (ref 70–110)
HCO3 UR-SCNC: 27 MMOL/L (ref 24–28)
HCT VFR BLD AUTO: 36.5 % (ref 40–54)
HCT VFR BLD CALC: 30 %PCV (ref 36–54)
HGB BLD-MCNC: 10 G/DL
HGB BLD-MCNC: 12.8 G/DL (ref 14–18)
IMM GRANULOCYTES # BLD AUTO: 0.05 K/UL (ref 0–0.04)
IMM GRANULOCYTES NFR BLD AUTO: 0.6 % (ref 0–0.5)
INR PPP: 1 (ref 0.8–1.2)
LYMPHOCYTES # BLD AUTO: 1 K/UL (ref 1–4.8)
LYMPHOCYTES NFR BLD: 11.8 % (ref 18–48)
MAGNESIUM SERPL-MCNC: 1.9 MG/DL (ref 1.6–2.6)
MCH RBC QN AUTO: 34 PG (ref 27–31)
MCHC RBC AUTO-ENTMCNC: 35.1 G/DL (ref 32–36)
MCV RBC AUTO: 97 FL (ref 82–98)
MONOCYTES # BLD AUTO: 0.7 K/UL (ref 0.3–1)
MONOCYTES NFR BLD: 7.7 % (ref 4–15)
NEUTROPHILS # BLD AUTO: 6.9 K/UL (ref 1.8–7.7)
NEUTROPHILS NFR BLD: 79.4 % (ref 38–73)
NRBC BLD-RTO: 0 /100 WBC
PCO2 BLDA: 48.5 MMHG (ref 35–45)
PH SMN: 7.35 [PH] (ref 7.35–7.45)
PLATELET # BLD AUTO: 247 K/UL (ref 150–450)
PMV BLD AUTO: 9.1 FL (ref 9.2–12.9)
PO2 BLDA: 19 MMHG (ref 40–60)
POC BE: 1 MMOL/L
POC IONIZED CALCIUM: 1.13 MMOL/L (ref 1.06–1.42)
POC MOLECULAR INFLUENZA A AGN: NEGATIVE
POC MOLECULAR INFLUENZA B AGN: POSITIVE
POC SATURATED O2: 27 % (ref 95–100)
POC TCO2: 28 MMOL/L (ref 24–29)
POCT GLUCOSE: 98 MG/DL (ref 70–110)
POTASSIUM BLD-SCNC: 4.1 MMOL/L (ref 3.5–5.1)
POTASSIUM SERPL-SCNC: 4.9 MMOL/L (ref 3.5–5.1)
PROT SERPL-MCNC: 5.9 G/DL (ref 6–8.4)
PROTHROMBIN TIME: 10.9 SEC (ref 9–12.5)
RBC # BLD AUTO: 3.77 M/UL (ref 4.6–6.2)
SAMPLE: ABNORMAL
SARS-COV-2 RDRP RESP QL NAA+PROBE: NEGATIVE
SODIUM BLD-SCNC: 125 MMOL/L (ref 136–145)
SODIUM SERPL-SCNC: 124 MMOL/L (ref 136–145)
TSH SERPL DL<=0.005 MIU/L-ACNC: 3.42 UIU/ML (ref 0.4–4)
WBC # BLD AUTO: 8.68 K/UL (ref 3.9–12.7)

## 2023-01-05 PROCEDURE — 99285 EMERGENCY DEPT VISIT HI MDM: CPT | Mod: 25

## 2023-01-05 PROCEDURE — 85025 COMPLETE CBC W/AUTO DIFF WBC: CPT | Performed by: EMERGENCY MEDICINE

## 2023-01-05 PROCEDURE — 80053 COMPREHEN METABOLIC PANEL: CPT | Performed by: EMERGENCY MEDICINE

## 2023-01-05 PROCEDURE — 84443 ASSAY THYROID STIM HORMONE: CPT | Performed by: EMERGENCY MEDICINE

## 2023-01-05 PROCEDURE — 85610 PROTHROMBIN TIME: CPT | Performed by: EMERGENCY MEDICINE

## 2023-01-05 PROCEDURE — 29130 APPL FINGER SPLINT STATIC: CPT

## 2023-01-05 PROCEDURE — 85379 FIBRIN DEGRADATION QUANT: CPT | Performed by: EMERGENCY MEDICINE

## 2023-01-05 PROCEDURE — 82962 GLUCOSE BLOOD TEST: CPT

## 2023-01-05 PROCEDURE — 83735 ASSAY OF MAGNESIUM: CPT | Performed by: EMERGENCY MEDICINE

## 2023-01-05 PROCEDURE — 87635 SARS-COV-2 COVID-19 AMP PRB: CPT | Performed by: EMERGENCY MEDICINE

## 2023-01-05 PROCEDURE — 25000003 PHARM REV CODE 250: Performed by: EMERGENCY MEDICINE

## 2023-01-05 RX ORDER — MUPIROCIN 20 MG/G
1 OINTMENT TOPICAL
Status: COMPLETED | OUTPATIENT
Start: 2023-01-05 | End: 2023-01-05

## 2023-01-05 RX ADMIN — MUPIROCIN 22 G: 20 OINTMENT TOPICAL at 10:01

## 2023-01-05 RX ADMIN — IOHEXOL 100 ML: 350 INJECTION, SOLUTION INTRAVENOUS at 11:01

## 2023-01-06 ENCOUNTER — TELEPHONE (OUTPATIENT)
Dept: ORTHOPEDICS | Facility: CLINIC | Age: 73
End: 2023-01-06
Payer: MEDICARE

## 2023-01-06 VITALS
BODY MASS INDEX: 23.7 KG/M2 | DIASTOLIC BLOOD PRESSURE: 65 MMHG | RESPIRATION RATE: 18 BRPM | TEMPERATURE: 98 F | HEIGHT: 72 IN | WEIGHT: 175 LBS | OXYGEN SATURATION: 97 % | HEART RATE: 74 BPM | SYSTOLIC BLOOD PRESSURE: 120 MMHG

## 2023-01-06 PROCEDURE — 96365 THER/PROPH/DIAG IV INF INIT: CPT | Mod: 59

## 2023-01-06 PROCEDURE — 63700000 PHARM REV CODE 250 ALT 637 W/O HCPCS: Performed by: EMERGENCY MEDICINE

## 2023-01-06 PROCEDURE — 25000003 PHARM REV CODE 250: Performed by: EMERGENCY MEDICINE

## 2023-01-06 PROCEDURE — 25500020 PHARM REV CODE 255: Performed by: EMERGENCY MEDICINE

## 2023-01-06 PROCEDURE — 63600175 PHARM REV CODE 636 W HCPCS: Performed by: EMERGENCY MEDICINE

## 2023-01-06 RX ORDER — BENZONATATE 100 MG/1
100 CAPSULE ORAL 3 TIMES DAILY PRN
Qty: 20 CAPSULE | Refills: 0 | Status: SHIPPED | OUTPATIENT
Start: 2023-01-06 | End: 2023-01-16

## 2023-01-06 RX ORDER — NAPROXEN 500 MG/1
500 TABLET ORAL 2 TIMES DAILY PRN
Qty: 60 TABLET | Refills: 0 | Status: ON HOLD | OUTPATIENT
Start: 2023-01-06 | End: 2023-02-24 | Stop reason: HOSPADM

## 2023-01-06 RX ORDER — AZITHROMYCIN 250 MG/1
250 TABLET, FILM COATED ORAL DAILY
Qty: 4 TABLET | Refills: 0 | Status: SHIPPED | OUTPATIENT
Start: 2023-01-07 | End: 2023-01-11

## 2023-01-06 RX ORDER — AZITHROMYCIN 250 MG/1
500 TABLET, FILM COATED ORAL
Status: COMPLETED | OUTPATIENT
Start: 2023-01-06 | End: 2023-01-06

## 2023-01-06 RX ADMIN — CEFTRIAXONE 1 G: 1 INJECTION, SOLUTION INTRAVENOUS at 01:01

## 2023-01-06 RX ADMIN — AZITHROMYCIN MONOHYDRATE 500 MG: 250 TABLET ORAL at 01:01

## 2023-01-06 RX ADMIN — SODIUM CHLORIDE 500 ML: 0.9 INJECTION, SOLUTION INTRAVENOUS at 01:01

## 2023-01-06 NOTE — DISCHARGE INSTRUCTIONS
Thank you for letting us take care of you today! It was nice meeting you, and I hope you feel better soon.     Call your primary care doctor to make the first available appointment.     Keep all your medical appointments.     Take your regular medication as prescribed. Contact your primary care provider before running out of medication, or for any problems obtaining them.    Do not drive or operate heavy machinery while taking opioid or muscle relaxing medications. Examples include norco, percocet, xanax, valium, flexeril.     Overuse or long term use of pain and sedating medication may lead to addiction, dependence, organ failure, family and work problems, legal problems, accidental overdose and death.    If you do not have health insurance, you probably can afford it:  Call [phone] ([facility] hotline) or go to www.[employer]Team.[place].gov    Your evaluation in the ER does not suggest any emergent or life threatening medical condition requiring admission or immediate intervention beyond that provided in the ER.   However, the signs of a serious problem sometimes take more time to appear.     Do not hesitate to return to the ER if any of the following occur:    Weakness, dizziness, fainting, or loss of consciousness   Fever of 100.4ºF (38ºC) or higher  Any worse symptoms  Any new or concerning symptoms    To protect yourself and others from COVID19:  Get vaccinated.   Anyone over 6 months old is eligible for vaccination.   If your last dose was over 6 months ago, you are probably due for another shot.   Vaccination is shown to prevent getting sick, ending up in the hospital, or dying because of COVID19.     If not vaccinated or over a long time since your last dose:  Your shot is waiting for you. To get it:   Text your ZIP code to GETVAX ([id number]) or VACUNA ([id number]) in [place]  call 311, or [phone], or [phone], or [phone],   go to www.vaccines.gov, or  Call your health provider    If exposed to someone with  cold, flu, or COVID19 symptoms, consider quarantining or at least wearing a mask around others for at least 5 days.   Even if you have no symptoms   Otherwise you could give the virus to someone who dies from it    Some symptoms of COVID19 include congestion, fever, cough, muscle aches, sore throat, breathing troubles, headaches, stomach upset, diarrhea.   Every [place] household is eligible to order 4 free at-home COVID-19 tests from www.covidtests.gov    Overview  Influenza (flu) is an infection in the lungs and breathing passages. It is caused by the influenza virus. There are different strains, or types, of the flu virus from year to year. Unlike the common cold, the flu comes on suddenly and the symptoms can be more severe. These symptoms include a cough, congestion, fever, chills, fatigue, aches, and pains. These symptoms may last for a few weeks. Although the flu can make you feel very sick, it usually doesn't cause serious health problems.    Home treatment is usually all you need for flu symptoms. But your doctor may prescribe antiviral medicine to prevent other health problems, such as pneumonia, from developing. The risk of other health problems from the flu is highest for young children (under 2), older adults (over 65), pregnant women, and people with long-term health conditions.    Follow-up care is a key part of your treatment and safety. Be sure to make and go to all appointments, and call your doctor if you are having problems. It's also a good idea to know your test results and keep a list of the medicines you take.    How can you care for yourself at home?  Get plenty of rest.  Drink plenty of fluids. If you have to limit fluids because of a health problem, talk with your doctor before you increase the amount of fluids you drink.  Take an over-the-counter pain medicine if needed, such as acetaminophen (Tylenol), ibuprofen (Advil, Motrin), or naproxen (Aleve), to relieve fever, headache, and muscle  aches. Be safe with medicines. Read and follow all instructions on the label.  No one younger than 20 should take aspirin. It has been linked to Reye syndrome, a serious illness.  Take any prescribed medicine exactly as directed.  Do not smoke. Smoking can make the flu worse. If you need help quitting, talk to your doctor about stop-smoking programs and medicines. These can increase your chances of quitting for good.  If the skin around your nose and lips becomes sore, put some petroleum jelly (such as Vaseline) on the area.  To ease coughing:  Suck on cough drops or plain, hard candy.  Try an over-the-counter cough or cold medicine. Read and follow all instructions on the label.  Raise your head at night with an extra pillow. This may help you rest if coughing keeps you awake.    To avoid spreading the flu  Wash your hands regularly, and keep your hands away from your face.  Stay home from school, work, and other public places until you are feeling better and your fever has been gone for at least 24 hours. The fever needs to have gone away on its own without the help of medicine.  Ask people living with you to talk to their doctors about preventing the flu. They may get antiviral medicine to keep from getting the flu from you.  To prevent the flu in the future, get the flu vaccine every fall. Encourage people living with you to get the vaccine.  Cover your mouth when you cough or sneeze. If you can, cough or sneeze into the bend of your elbow, not your hands.      When should you call for help?  Call 911 anytime you think you may need emergency care. For example, call if:    You have severe trouble breathing.  You have a seizure.    Call your doctor now or seek immediate medical care if:    You have trouble breathing.  You have a fever with a stiff neck or a severe headache.  You have pain or pressure in your chest or belly.  You have a fever or cough that returns after getting better.  You feel very sleepy, dizzy,  or confused.  You are not urinating.  You have severe muscle pain.  You have severe weakness, or you are unsteady.  You have medical conditions that are getting worse.    Watch closely for changes in your health, and be sure to contact your doctor if:    You do not get better as expected.  You are having a problem with your medicine.

## 2023-01-06 NOTE — TELEPHONE ENCOUNTER
Reached out to patient and left a voicemail regarding getting a follow up appointment for his finger. His chart stated he has covid and the flu. Told him we will need to push the appointment back until he is feeling better

## 2023-01-06 NOTE — ED PROVIDER NOTES
"  Source of History:  Medical record, patient, patient's wife.    Chief complaint:  Per triage note: "Fall (Pt arrived via ems secondary to trip and fall at home in drive [address]. No loc reported. C/o left 2nd finger pain (possible deformity) and has a small lac to right ear. Pt takes plavix. Currently aaox4. )  "    HPI:    Patient presents partial-thickness right auricle laceration, left second digit MCP pain, edema, and deformity after mechanical trip and fall while trying to help his dog out of a vehicle.  Patient lost his balance, believes he struck his hand against a shutter, and suspects that he scratched his auricle with his hearing aid.  Reports decreased sensation at his left second digit.   This occurred just prior to arrival.  He is not taken any analgesics yet.  Progression is persistent.  Denies any loss of consciousness.  Patient's wife also reports patient has had a cough for last 2 weeks, as well as malaise, fatigue.  He was evaluated on January 1 for those symptoms with unrevealing evaluation.  This is the extent of the patient's complaints at this time.     ROS:   As per HPI and below:   General: No fever.   GI: No abdominal pain. No nausea. No vomiting. No diarrhea.   All other systems reviewed and are negative.      Review of patient's allergies indicates:   Allergen Reactions    Ciprofloxacin Hives and Swelling       PMH:  As per HPI and below:  Past Medical History:   Diagnosis Date    CAD (coronary artery disease)     Chronic anticoagulation 01/05/2023    CKD (chronic kidney disease), stage IV     Diabetes mellitus, type II     Esophageal carcinoma     GERD (gastroesophageal reflux disease)     H/O therapeutic radiation     History of chemotherapy     History of renal cell carcinoma     initial response to pembrolizumab, recurrence in a mediastinal node 6/22, on cabozantinib    Hyperlipemia     Hypertension     Renal cell carcinoma 01/05/2023    Status post chemoradiation  "       Past Surgical History:   Procedure Laterality Date    CORONARY ARTERY BYPASS GRAFT      X 3    CORONARY STENT PLACEMENT      NEPHRECTOMY Left        Social History     Tobacco Use    Smoking status: Former     Types: Cigarettes     Quit date: 1965     Years since quittin.0    Smokeless tobacco: Never    Tobacco comments:     >40 years   Substance Use Topics    Alcohol use: Yes     Alcohol/week: 6.0 standard drinks     Types: 6 Cans of beer per week     Comment: occasionally    Drug use: No       Physical Exam:      Nursing note and vitals reviewed.  /65   Pulse 74   Temp 97.5 °F (36.4 °C) (Oral)   Resp 18   Ht 6' (1.829 m)   Wt 79.4 kg (175 lb)   SpO2 97%   BMI 23.73 kg/m²     Constitutional:  Awake, alert. No distress.   Eyes: EOMI. No discharge. Anicteric.  HENT:  very superficial laceration at R auricle not amenable to repair. No lacerations, contusions, or abrasions on close inspection.  Neck: Normal range of motion. Neck supple.  No midline spinal tenderness, step-offs, or deformities.  Cardiovascular: Normal rate. No murmur, no gallop and no friction rub heard.   Pulmonary/Chest: No respiratory distress. Effort normal. No wheezes, no rales, no rhonchi.   Abdominal: Bowel sounds normal. Soft. No distension and no mass. There is no tenderness. There is no rebound, no guarding, no tenderness at McBurney's point.  Musculoskeletal:   No bony tenderness at large joints or long bones.  No midline spinal tenderness, step-offs, or deformities.    Left upper extremity:  Base of second digit deformity.  Patient reports decreased distal light touch sensation.  Scattered abrasions with no lacerations of left hand. 2+ pulses, cap refill <2sec. FROM at shoulder/elbow. wrist ROM limited by pain. 5/5 Flexion and extension, finger flexion / extension of other fingers. Light touch otherwise intact in median / radial / ulnar distributions. Compartments soft.     Neurological:  GCS 15. Awake,  alert, oriented. No gross cranial nerve, light touch or strength deficit. Coordination normal.   Skin: Skin is warm and dry.   EXT: 2+ radial pulses.   Psychiatric: Behavior is normal. Judgment normal.    MDM:    I decided to obtain the patient's medical records. I reviewed patient's prior external notes / results: (specialist notes (heme/onc, outside immunization records)).     ED Course as of 01/06/23 0555   Thu Jan 05, 2023 2108 Patient is a 72-year-old male with hypertension, CAD, diabetes, history of esophageal cancer (in remission, s/p esophagectomy) and kidney cancer (initial response to pembrolizumab, with recurrence in a mediastinal node, on cabozantinib) who presents after mechanical trip and fall resulting in ear laceration, left hand pain.  Patient's wife also reports that for about the last 2 weeks, he has had a cough, has had decreased energy, appears fatigued. He was evaluated at an outside emergency department on January 1.  His labs were largely unremarkable including negative COVID-19 and influenza, hypocalcemia of 7.5, and hyponatremia 129. He is up to date with his tetanus vaccination.   Exam notable for patient awake, alert, oriented, with left second MCP deformity, shallow right auricle laceration not amenable to repair, shallow abrasions at his left hand.  Initial differential also included intracranial bleeding, dehydration, gross metabolic abnormality, fracture/dislocation, acute viral syndrome including COVID and influenza. [RC]   2108 I independently interpreted and reviewed the patient's hand films, notable for dislocation of second MCP.  Will reduce.       [RC]   2129 ===========================================  Orthopedic Reduction  Performed by: DELPHINE CARRION  Injury location: left 2nd finger MCP  Intervention: reuction  Consent Done: Yes  Risks and benefits: risks, benefits and alternatives were discussed  Consent given by: patient   Required items: required documents, blood  products, implants, devices, and special equipment available  Patient identity confirmed: MRN, , name, arm band and provided demographic data  Pre-procedure distal perfusion: normal  Pre-procedure neurological function: reduced sensation  Patient sedated: no    Post-procedure neurovascular assessment: post-procedure neurovascularly intact  Post-procedure distal perfusion: normal  Post-procedure neurological function: improved decreased sensation  Post-procedure range of motion: decreased  Patient tolerance: Patient tolerated the procedure well with no immediate complications  ===========================================   [RC]   2309 I independently interpreted and reviewed the patient's chest x-ray, notable for increased right perihilar opacities.  Patient's postreduction finger films show successful reduction.   I independently reviewed and interpreted labs (CBC, CMP, D-dimer, COVID, influenza) which are notable for metabolic acidosis, hypocalcemia with total calcium 8.1, hyponatremia with sodium 124, elevated D-dimer, positive influenza B.   Given the chronicity of the patient's symptoms, he is outside the window for antiviral treatment for his influenza.   [RC]   2342 I independently interpreted and reviewed patient's point of care electrolytes, notable for normal ionized calcium. [RC]    There was a delay in disposition due to delay in radiology reporting.   I independently reviewed and interpreted CT head which shows no acute intracranial bleeding, mass, skull fracture, or acute intracranial process.    I independently interpreted and reviewed the patient's CTA chest, notable for no evidence of acute pulmonary embolus.  There appears to be right upper lobe consolidation.   [RC]   225 ================================  Procedure: Splint  Performed by: tech  Supervised by: Ha Lopez MD  Injury location and type: left index subluxation    Immobilization / Splint type: aluminum finger  splint.   Pre-procedure distal perfusion: normal  Pre-procedure neurological function: normal  Pre-procedure range of motion: reduced      Post-procedure neurovascular assessment: post-procedure neurovascularly intact  Post-procedure distal perfusion: normal  Post-procedure neurological function: normal  Post-procedure range of motion comment: splinted  Patient tolerance: Patient tolerated the procedure well with no immediate complications.  ==================================================================   [RC]   0231 Patient with no hypoxia with ambulation on room air.  Patient and his wife feel comfortable with discharge home.    --  I discussed with patient and/or guardian/caretaker that this evaluation in the ED does not suggest any emergent or life threatening medical condition requiring admission or further immediate intervention or diagnostics. Regardless, an unremarkable evaluation in the ED does not preclude the development or presence of a serious or life threatening condition. As such, patient was instructed to return for any worsening, new, changed, or concerning symptoms.     I had a detailed discussion with patient and/or guardian/caretaker regarding findings, plan, return precautions, importance of medication adherence, need to follow-up with a PCP and specialist (hand clinic). All questions answered.     Management decisions for this encounter made during COVID-19 public health emergency. Available resources, standards for appropriate emergency department evaluation, and admission vs. discharge standards have necessarily shifted and remain dynamic.     Note was created using voice recognition software. It may have occasional typographical errors not identified and edited despite initial review prior to signing.   [RC]      ED Course User Index  [RC] Ha Lopez MD       Medications   mupirocin 2 % ointment 22 g (22 g Topical (Top) Given 1/5/23 2226)   iohexoL (OMNIPAQUE 350) injection 100 mL  (100 mLs Intravenous Given 1/5/23 2312)   cefTRIAXone (ROCEPHIN) 1 g/50 mL D5W IVPB (0 g Intravenous Stopped 1/6/23 0141)   azithromycin tablet 500 mg (500 mg Oral Given 1/6/23 0105)   sodium chloride 0.9% bolus 500 mL 500 mL (0 mLs Intravenous Stopped 1/6/23 0210)            No future appointments.     Diagnostic Impression:    1. Influenza B    2. Cough    3. Pneumonia of right upper lobe due to influenza A virus    4. Subluxation of left index finger, initial encounter         ED Disposition Condition    Discharge Good          ED Prescriptions       Medication Sig Dispense Start Date End Date Auth. Provider    naproxen (NAPROSYN) 500 MG tablet Take 1 tablet (500 mg total) by mouth 2 (two) times daily as needed (pain). 60 tablet 1/6/2023 -- Ha Lopez MD    benzonatate (TESSALON) 100 MG capsule Take 1 capsule (100 mg total) by mouth 3 (three) times daily as needed for Cough. 20 capsule 1/6/2023 1/16/2023 Ha Lopez MD    azithromycin (Z-RUTH) 250 MG tablet Take 1 tablet (250 mg total) by mouth once daily. for 4 days 4 tablet 1/7/2023 1/11/2023 Ha Lopez MD          Follow-up Information       Follow up With Specialties Details Why Contact Info Additional Information    Isrrael Lipscomb MD Internal Medicine Schedule an appointment as soon as possible for a visit in 1 week For recheck with your primary care doctor [address] [place]  [address]  [facility] [address] [phone]       [facility] Schedule an appointment as soon as possible for a visit in 1 week For recheck with specialist [address], [address]  [facility] [address] [phone] [facility], 9th Floor Please park in [place] and use [place] elevators               Ha Lopez MD  01/06/23 0553       Ha Lopez MD  01/06/23 0587

## 2023-02-23 ENCOUNTER — HOSPITAL ENCOUNTER (OUTPATIENT)
Facility: HOSPITAL | Age: 73
Discharge: HOME | End: 2023-02-24
Attending: EMERGENCY MEDICINE | Admitting: STUDENT IN AN ORGANIZED HEALTH CARE EDUCATION/TRAINING PROGRAM
Payer: MEDICARE

## 2023-02-23 DIAGNOSIS — R06.00 DYSPNEA: ICD-10-CM

## 2023-02-23 DIAGNOSIS — R06.09 DYSPNEA ON EXERTION: Primary | ICD-10-CM

## 2023-02-23 DIAGNOSIS — R06.02 SHORTNESS OF BREATH: ICD-10-CM

## 2023-02-23 LAB
ALBUMIN SERPL BCP-MCNC: 2.8 G/DL (ref 3.5–5.2)
ALP SERPL-CCNC: 58 U/L (ref 55–135)
ALT SERPL W/O P-5'-P-CCNC: 15 U/L (ref 10–44)
ANION GAP SERPL CALC-SCNC: 6 MMOL/L (ref 8–16)
AST SERPL-CCNC: 28 U/L (ref 10–40)
BASOPHILS # BLD AUTO: 0.02 K/UL (ref 0–0.2)
BASOPHILS NFR BLD: 0.4 % (ref 0–1.9)
BILIRUB SERPL-MCNC: 0.7 MG/DL (ref 0.1–1)
BNP SERPL-MCNC: 97 PG/ML (ref 0–99)
BUN SERPL-MCNC: 22 MG/DL (ref 8–23)
CALCIUM SERPL-MCNC: 8.2 MG/DL (ref 8.7–10.5)
CHLORIDE SERPL-SCNC: 96 MMOL/L (ref 95–110)
CO2 SERPL-SCNC: 25 MMOL/L (ref 23–29)
CREAT SERPL-MCNC: 1.1 MG/DL (ref 0.5–1.4)
DIFFERENTIAL METHOD: ABNORMAL
EOSINOPHIL # BLD AUTO: 0 K/UL (ref 0–0.5)
EOSINOPHIL NFR BLD: 0.7 % (ref 0–8)
ERYTHROCYTE [DISTWIDTH] IN BLOOD BY AUTOMATED COUNT: 15.6 % (ref 11.5–14.5)
EST. GFR  (NO RACE VARIABLE): >60 ML/MIN/1.73 M^2
GLUCOSE SERPL-MCNC: 107 MG/DL (ref 70–110)
GLUCOSE SERPL-MCNC: 91 MG/DL (ref 70–110)
HCT VFR BLD AUTO: 30 % (ref 40–54)
HGB BLD-MCNC: 9.9 G/DL (ref 14–18)
IMM GRANULOCYTES # BLD AUTO: 0.02 K/UL (ref 0–0.04)
IMM GRANULOCYTES NFR BLD AUTO: 0.4 % (ref 0–0.5)
LYMPHOCYTES # BLD AUTO: 2 K/UL (ref 1–4.8)
LYMPHOCYTES NFR BLD: 36.5 % (ref 18–48)
MCH RBC QN AUTO: 33.6 PG (ref 27–31)
MCHC RBC AUTO-ENTMCNC: 33 G/DL (ref 32–36)
MCV RBC AUTO: 102 FL (ref 82–98)
MONOCYTES # BLD AUTO: 0.6 K/UL (ref 0.3–1)
MONOCYTES NFR BLD: 10.7 % (ref 4–15)
NEUTROPHILS # BLD AUTO: 2.8 K/UL (ref 1.8–7.7)
NEUTROPHILS NFR BLD: 51.3 % (ref 38–73)
NRBC BLD-RTO: 0 /100 WBC
PLATELET # BLD AUTO: 168 K/UL (ref 150–450)
PMV BLD AUTO: 9.6 FL (ref 9.2–12.9)
POTASSIUM SERPL-SCNC: 4.6 MMOL/L (ref 3.5–5.1)
PROT SERPL-MCNC: 6 G/DL (ref 6–8.4)
RBC # BLD AUTO: 2.95 M/UL (ref 4.6–6.2)
SODIUM SERPL-SCNC: 127 MMOL/L (ref 136–145)
TROPONIN I SERPL HS-MCNC: 9.8 PG/ML (ref 0–14.9)
TROPONIN I SERPL HS-MCNC: 9.9 PG/ML (ref 0–14.9)
WBC # BLD AUTO: 5.4 K/UL (ref 3.9–12.7)

## 2023-02-23 PROCEDURE — 96365 THER/PROPH/DIAG IV INF INIT: CPT | Mod: 59

## 2023-02-23 PROCEDURE — 63600175 PHARM REV CODE 636 W HCPCS: Performed by: EMERGENCY MEDICINE

## 2023-02-23 PROCEDURE — 93010 ELECTROCARDIOGRAM REPORT: CPT | Mod: ,,, | Performed by: SPECIALIST

## 2023-02-23 PROCEDURE — 96367 TX/PROPH/DG ADDL SEQ IV INF: CPT

## 2023-02-23 PROCEDURE — 93005 ELECTROCARDIOGRAM TRACING: CPT | Performed by: SPECIALIST

## 2023-02-23 PROCEDURE — 84484 ASSAY OF TROPONIN QUANT: CPT | Mod: 91 | Performed by: STUDENT IN AN ORGANIZED HEALTH CARE EDUCATION/TRAINING PROGRAM

## 2023-02-23 PROCEDURE — 84484 ASSAY OF TROPONIN QUANT: CPT | Performed by: EMERGENCY MEDICINE

## 2023-02-23 PROCEDURE — 99285 EMERGENCY DEPT VISIT HI MDM: CPT | Mod: 25

## 2023-02-23 PROCEDURE — G0378 HOSPITAL OBSERVATION PER HR: HCPCS

## 2023-02-23 PROCEDURE — 83880 ASSAY OF NATRIURETIC PEPTIDE: CPT | Performed by: EMERGENCY MEDICINE

## 2023-02-23 PROCEDURE — 25000003 PHARM REV CODE 250: Performed by: EMERGENCY MEDICINE

## 2023-02-23 PROCEDURE — 93010 EKG 12-LEAD: ICD-10-PCS | Mod: ,,, | Performed by: SPECIALIST

## 2023-02-23 PROCEDURE — 25500020 PHARM REV CODE 255: Performed by: EMERGENCY MEDICINE

## 2023-02-23 PROCEDURE — 25000003 PHARM REV CODE 250: Performed by: STUDENT IN AN ORGANIZED HEALTH CARE EDUCATION/TRAINING PROGRAM

## 2023-02-23 PROCEDURE — 87040 BLOOD CULTURE FOR BACTERIA: CPT | Performed by: EMERGENCY MEDICINE

## 2023-02-23 PROCEDURE — 85025 COMPLETE CBC W/AUTO DIFF WBC: CPT | Performed by: EMERGENCY MEDICINE

## 2023-02-23 PROCEDURE — 96366 THER/PROPH/DIAG IV INF ADDON: CPT

## 2023-02-23 PROCEDURE — 80053 COMPREHEN METABOLIC PANEL: CPT | Performed by: EMERGENCY MEDICINE

## 2023-02-23 PROCEDURE — 36415 COLL VENOUS BLD VENIPUNCTURE: CPT | Performed by: STUDENT IN AN ORGANIZED HEALTH CARE EDUCATION/TRAINING PROGRAM

## 2023-02-23 RX ORDER — IBUPROFEN 200 MG
24 TABLET ORAL
Status: DISCONTINUED | OUTPATIENT
Start: 2023-02-23 | End: 2023-02-24 | Stop reason: HOSPADM

## 2023-02-23 RX ORDER — DULOXETIN HYDROCHLORIDE 30 MG/1
30 CAPSULE, DELAYED RELEASE ORAL 3 TIMES DAILY
Status: ON HOLD | COMMUNITY
Start: 2022-12-08 | End: 2023-02-24 | Stop reason: HOSPADM

## 2023-02-23 RX ORDER — IODIXANOL 320 MG/ML
100 INJECTION, SOLUTION INTRAVASCULAR
Status: COMPLETED | OUTPATIENT
Start: 2023-02-23 | End: 2023-02-23

## 2023-02-23 RX ORDER — ACETAMINOPHEN 325 MG/1
650 TABLET ORAL EVERY 8 HOURS PRN
Status: DISCONTINUED | OUTPATIENT
Start: 2023-02-23 | End: 2023-02-24 | Stop reason: HOSPADM

## 2023-02-23 RX ORDER — ONDANSETRON 2 MG/ML
4 INJECTION INTRAMUSCULAR; INTRAVENOUS EVERY 8 HOURS PRN
Status: DISCONTINUED | OUTPATIENT
Start: 2023-02-23 | End: 2023-02-24 | Stop reason: HOSPADM

## 2023-02-23 RX ORDER — GLUCAGON 1 MG
1 KIT INJECTION
Status: DISCONTINUED | OUTPATIENT
Start: 2023-02-23 | End: 2023-02-24 | Stop reason: HOSPADM

## 2023-02-23 RX ORDER — TALC
6 POWDER (GRAM) TOPICAL NIGHTLY PRN
Status: DISCONTINUED | OUTPATIENT
Start: 2023-02-23 | End: 2023-02-24 | Stop reason: HOSPADM

## 2023-02-23 RX ORDER — AMOXICILLIN 500 MG/1
500 TABLET, FILM COATED ORAL 3 TIMES DAILY
COMMUNITY
Start: 2023-02-22

## 2023-02-23 RX ORDER — IBUPROFEN 200 MG
16 TABLET ORAL
Status: DISCONTINUED | OUTPATIENT
Start: 2023-02-23 | End: 2023-02-24 | Stop reason: HOSPADM

## 2023-02-23 RX ORDER — ATORVASTATIN CALCIUM 10 MG/1
10 TABLET, FILM COATED ORAL NIGHTLY
Status: DISCONTINUED | OUTPATIENT
Start: 2023-02-23 | End: 2023-02-24 | Stop reason: HOSPADM

## 2023-02-23 RX ORDER — AMOXICILLIN 250 MG/1
500 CAPSULE ORAL 3 TIMES DAILY
Status: DISCONTINUED | OUTPATIENT
Start: 2023-02-24 | End: 2023-02-24 | Stop reason: HOSPADM

## 2023-02-23 RX ORDER — TAMSULOSIN HYDROCHLORIDE 0.4 MG/1
0.4 CAPSULE ORAL DAILY
Status: ON HOLD | COMMUNITY
End: 2023-02-24 | Stop reason: HOSPADM

## 2023-02-23 RX ORDER — SODIUM CHLORIDE 0.9 % (FLUSH) 0.9 %
3 SYRINGE (ML) INJECTION EVERY 6 HOURS PRN
Status: DISCONTINUED | OUTPATIENT
Start: 2023-02-23 | End: 2023-02-24 | Stop reason: HOSPADM

## 2023-02-23 RX ORDER — DIPHENOXYLATE HYDROCHLORIDE AND ATROPINE SULFATE 2.5; .025 MG/1; MG/1
1 TABLET ORAL
Status: ON HOLD | COMMUNITY
End: 2023-02-24 | Stop reason: HOSPADM

## 2023-02-23 RX ORDER — HYDROCODONE BITARTRATE AND ACETAMINOPHEN 5; 325 MG/1; MG/1
1 TABLET ORAL EVERY 6 HOURS PRN
COMMUNITY
Start: 2023-02-22

## 2023-02-23 RX ORDER — LEVOTHYROXINE SODIUM 25 UG/1
50 TABLET ORAL
Status: DISCONTINUED | OUTPATIENT
Start: 2023-02-24 | End: 2023-02-24 | Stop reason: HOSPADM

## 2023-02-23 RX ORDER — INSULIN ASPART 100 [IU]/ML
0-5 INJECTION, SOLUTION INTRAVENOUS; SUBCUTANEOUS
Status: DISCONTINUED | OUTPATIENT
Start: 2023-02-23 | End: 2023-02-24 | Stop reason: HOSPADM

## 2023-02-23 RX ORDER — ALBUTEROL SULFATE 0.83 MG/ML
2.5 SOLUTION RESPIRATORY (INHALATION) EVERY 4 HOURS PRN
Status: DISCONTINUED | OUTPATIENT
Start: 2023-02-23 | End: 2023-02-24 | Stop reason: HOSPADM

## 2023-02-23 RX ORDER — LEVOTHYROXINE SODIUM 50 UG/1
50 TABLET ORAL
COMMUNITY
Start: 2023-01-13

## 2023-02-23 RX ORDER — ALBUTEROL SULFATE 90 UG/1
2 AEROSOL, METERED RESPIRATORY (INHALATION) EVERY 4 HOURS PRN
Status: DISCONTINUED | OUTPATIENT
Start: 2023-02-23 | End: 2023-02-23

## 2023-02-23 RX ADMIN — ATORVASTATIN CALCIUM 10 MG: 10 TABLET, FILM COATED ORAL at 10:02

## 2023-02-23 RX ADMIN — IODIXANOL 100 ML: 320 INJECTION, SOLUTION INTRAVASCULAR at 03:02

## 2023-02-23 RX ADMIN — CEFTRIAXONE 1 G: 1 INJECTION, SOLUTION INTRAVENOUS at 05:02

## 2023-02-23 RX ADMIN — AZITHROMYCIN 500 MG: 500 INJECTION, POWDER, LYOPHILIZED, FOR SOLUTION INTRAVENOUS at 05:02

## 2023-02-24 VITALS
BODY MASS INDEX: 23.23 KG/M2 | SYSTOLIC BLOOD PRESSURE: 140 MMHG | TEMPERATURE: 98 F | HEART RATE: 86 BPM | RESPIRATION RATE: 20 BRPM | HEIGHT: 72 IN | DIASTOLIC BLOOD PRESSURE: 93 MMHG | WEIGHT: 171.5 LBS | OXYGEN SATURATION: 97 %

## 2023-02-24 PROBLEM — C78.00 PULMONARY METASTASES: Chronic | Status: ACTIVE | Noted: 2023-02-24

## 2023-02-24 PROBLEM — E87.1 HYPONATREMIA: Status: RESOLVED | Noted: 2023-02-24 | Resolved: 2023-02-24

## 2023-02-24 PROBLEM — Z95.1 HX OF CABG: Chronic | Status: ACTIVE | Noted: 2023-02-24

## 2023-02-24 PROBLEM — E87.1 HYPONATREMIA: Status: ACTIVE | Noted: 2023-02-24

## 2023-02-24 LAB
ALBUMIN SERPL BCP-MCNC: 2.8 G/DL (ref 3.5–5.2)
ALP SERPL-CCNC: 61 U/L (ref 55–135)
ALT SERPL W/O P-5'-P-CCNC: 18 U/L (ref 10–44)
ANION GAP SERPL CALC-SCNC: 5 MMOL/L (ref 8–16)
AST SERPL-CCNC: 24 U/L (ref 10–40)
BASOPHILS # BLD AUTO: 0.02 K/UL (ref 0–0.2)
BASOPHILS NFR BLD: 0.4 % (ref 0–1.9)
BILIRUB DIRECT SERPL-MCNC: 0.2 MG/DL (ref 0.1–0.3)
BILIRUB SERPL-MCNC: 0.4 MG/DL (ref 0.1–1)
BUN SERPL-MCNC: 19 MG/DL (ref 8–23)
CALCIUM SERPL-MCNC: 8.5 MG/DL (ref 8.7–10.5)
CHLORIDE SERPL-SCNC: 102 MMOL/L (ref 95–110)
CO2 SERPL-SCNC: 28 MMOL/L (ref 23–29)
CREAT SERPL-MCNC: 0.9 MG/DL (ref 0.5–1.4)
DIFFERENTIAL METHOD: ABNORMAL
EOSINOPHIL # BLD AUTO: 0.1 K/UL (ref 0–0.5)
EOSINOPHIL NFR BLD: 3.1 % (ref 0–8)
ERYTHROCYTE [DISTWIDTH] IN BLOOD BY AUTOMATED COUNT: 15.5 % (ref 11.5–14.5)
EST. GFR  (NO RACE VARIABLE): >60 ML/MIN/1.73 M^2
FERRITIN SERPL-MCNC: 111 NG/ML (ref 20–300)
FOLATE SERPL-MCNC: 12.4 NG/ML (ref 4–24)
GLUCOSE SERPL-MCNC: 93 MG/DL (ref 70–110)
GLUCOSE SERPL-MCNC: 99 MG/DL (ref 70–110)
HCT VFR BLD AUTO: 28.5 % (ref 40–54)
HGB BLD-MCNC: 9.3 G/DL (ref 14–18)
IMM GRANULOCYTES # BLD AUTO: 0.01 K/UL (ref 0–0.04)
IMM GRANULOCYTES NFR BLD AUTO: 0.2 % (ref 0–0.5)
IRON SERPL-MCNC: 51 UG/DL (ref 45–160)
LYMPHOCYTES # BLD AUTO: 2.1 K/UL (ref 1–4.8)
LYMPHOCYTES NFR BLD: 45.8 % (ref 18–48)
MCH RBC QN AUTO: 33.3 PG (ref 27–31)
MCHC RBC AUTO-ENTMCNC: 32.6 G/DL (ref 32–36)
MCV RBC AUTO: 102 FL (ref 82–98)
MONOCYTES # BLD AUTO: 0.5 K/UL (ref 0.3–1)
MONOCYTES NFR BLD: 10.2 % (ref 4–15)
NEUTROPHILS # BLD AUTO: 1.8 K/UL (ref 1.8–7.7)
NEUTROPHILS NFR BLD: 40.3 % (ref 38–73)
NRBC BLD-RTO: 0 /100 WBC
PLATELET # BLD AUTO: 165 K/UL (ref 150–450)
PMV BLD AUTO: 9.6 FL (ref 9.2–12.9)
POTASSIUM SERPL-SCNC: 4.3 MMOL/L (ref 3.5–5.1)
PROCALCITONIN SERPL IA-MCNC: 0.12 NG/ML (ref 0–0.5)
PROT SERPL-MCNC: 5.6 G/DL (ref 6–8.4)
RBC # BLD AUTO: 2.79 M/UL (ref 4.6–6.2)
RETICS/RBC NFR AUTO: 3.4 % (ref 0.4–2)
SATURATED IRON: 22 % (ref 20–50)
SODIUM SERPL-SCNC: 135 MMOL/L (ref 136–145)
TOTAL IRON BINDING CAPACITY: 228 UG/DL (ref 250–450)
TRANSFERRIN SERPL-MCNC: 163 MG/DL (ref 200–375)
TRANSFERRIN SERPL-MCNC: 163 MG/DL (ref 200–375)
TROPONIN I SERPL HS-MCNC: 10 PG/ML (ref 0–14.9)
WBC # BLD AUTO: 4.52 K/UL (ref 3.9–12.7)

## 2023-02-24 PROCEDURE — 99900035 HC TECH TIME PER 15 MIN (STAT)

## 2023-02-24 PROCEDURE — 99205 PR OFFICE/OUTPT VISIT, NEW, LEVL V, 60-74 MIN: ICD-10-PCS | Mod: ,,, | Performed by: INTERNAL MEDICINE

## 2023-02-24 PROCEDURE — 36415 COLL VENOUS BLD VENIPUNCTURE: CPT | Performed by: STUDENT IN AN ORGANIZED HEALTH CARE EDUCATION/TRAINING PROGRAM

## 2023-02-24 PROCEDURE — 80076 HEPATIC FUNCTION PANEL: CPT | Performed by: STUDENT IN AN ORGANIZED HEALTH CARE EDUCATION/TRAINING PROGRAM

## 2023-02-24 PROCEDURE — 25000003 PHARM REV CODE 250: Performed by: STUDENT IN AN ORGANIZED HEALTH CARE EDUCATION/TRAINING PROGRAM

## 2023-02-24 PROCEDURE — 84145 PROCALCITONIN (PCT): CPT | Performed by: STUDENT IN AN ORGANIZED HEALTH CARE EDUCATION/TRAINING PROGRAM

## 2023-02-24 PROCEDURE — 99205 OFFICE O/P NEW HI 60 MIN: CPT | Mod: ,,, | Performed by: INTERNAL MEDICINE

## 2023-02-24 PROCEDURE — 85025 COMPLETE CBC W/AUTO DIFF WBC: CPT | Performed by: STUDENT IN AN ORGANIZED HEALTH CARE EDUCATION/TRAINING PROGRAM

## 2023-02-24 PROCEDURE — 84466 ASSAY OF TRANSFERRIN: CPT | Performed by: STUDENT IN AN ORGANIZED HEALTH CARE EDUCATION/TRAINING PROGRAM

## 2023-02-24 PROCEDURE — 80048 BASIC METABOLIC PNL TOTAL CA: CPT | Performed by: STUDENT IN AN ORGANIZED HEALTH CARE EDUCATION/TRAINING PROGRAM

## 2023-02-24 PROCEDURE — 85045 AUTOMATED RETICULOCYTE COUNT: CPT | Performed by: STUDENT IN AN ORGANIZED HEALTH CARE EDUCATION/TRAINING PROGRAM

## 2023-02-24 PROCEDURE — 83921 ORGANIC ACID SINGLE QUANT: CPT | Performed by: STUDENT IN AN ORGANIZED HEALTH CARE EDUCATION/TRAINING PROGRAM

## 2023-02-24 PROCEDURE — 84484 ASSAY OF TROPONIN QUANT: CPT | Performed by: STUDENT IN AN ORGANIZED HEALTH CARE EDUCATION/TRAINING PROGRAM

## 2023-02-24 PROCEDURE — G0378 HOSPITAL OBSERVATION PER HR: HCPCS

## 2023-02-24 PROCEDURE — 82728 ASSAY OF FERRITIN: CPT | Performed by: STUDENT IN AN ORGANIZED HEALTH CARE EDUCATION/TRAINING PROGRAM

## 2023-02-24 PROCEDURE — 82746 ASSAY OF FOLIC ACID SERUM: CPT | Performed by: STUDENT IN AN ORGANIZED HEALTH CARE EDUCATION/TRAINING PROGRAM

## 2023-02-24 RX ADMIN — AMOXICILLIN 500 MG: 250 CAPSULE ORAL at 09:02

## 2023-02-24 RX ADMIN — LEVOTHYROXINE SODIUM 50 MCG: 0.03 TABLET ORAL at 06:02

## 2023-02-24 NOTE — ED PROVIDER NOTES
Encounter Date: 2023       History     Chief Complaint   Patient presents with    Shortness of Breath     On exertion x 2 days      HPI    Seen and examined.  Presents following dental procedure where he had some teeth removed and noted exertional shortness of breath.  During the procedure he would stopped his aspirin and Plavix for several days, since then he has been reporting exertional dyspnea and discomfort.  This is an acute episodic process symptoms moderate persistent ongoing.  No alleviating factors noted.  Denies any associated fever chills nausea or vomiting.  Has some bruising to his face, but reports no additional concerns.  Denies abdominal pain.        Review of patient's allergies indicates:   Allergen Reactions    Ciprofloxacin Hives and Swelling     Past Medical History:   Diagnosis Date    CAD (coronary artery disease)     Chronic anticoagulation 2023    CKD (chronic kidney disease), stage IV     Diabetes mellitus, type II     Esophageal carcinoma     GERD (gastroesophageal reflux disease)     H/O therapeutic radiation     History of chemotherapy     History of renal cell carcinoma     initial response to pembrolizumab, recurrence in a mediastinal node , on cabozantinib    Hyperlipemia     Hypertension     Renal cell carcinoma 2023    Status post chemoradiation      Past Surgical History:   Procedure Laterality Date    CORONARY ARTERY BYPASS GRAFT      X 3    CORONARY STENT PLACEMENT      NEPHRECTOMY Left      No family history on file.  Social History     Tobacco Use    Smoking status: Former     Types: Cigarettes     Quit date: 1965     Years since quittin.1    Smokeless tobacco: Never    Tobacco comments:     >40 years   Substance Use Topics    Alcohol use: Yes     Alcohol/week: 6.0 standard drinks     Types: 6 Cans of beer per week     Comment: occasionally    Drug use: No     Review of Systems   Constitutional:  Negative for fever.   HENT:  Negative for sore  throat.    Respiratory:  Positive for shortness of breath.    Cardiovascular:  Negative for chest pain.   Gastrointestinal:  Negative for nausea.   Skin:  Positive for color change.   Neurological:  Negative for weakness.   Hematological:  Bruises/bleeds easily.     Physical Exam     Initial Vitals [02/23/23 1357]   BP Pulse Resp Temp SpO2   128/86 79 18 98 °F (36.7 °C) 99 %      MAP       --         Physical Exam    Nursing note and vitals reviewed.  Constitutional: He appears well-developed and well-nourished.   HENT:   Head: Normocephalic and atraumatic.   No significant bleeding in his mouth post extraction   Eyes: Conjunctivae are normal.   Cardiovascular:  Normal rate and regular rhythm.           Pulmonary/Chest: No respiratory distress.   Abdominal: Abdomen is soft.   Musculoskeletal:         General: Normal range of motion.     Neurological: He is alert and oriented to person, place, and time.   Skin: Skin is warm and dry.   Psychiatric: He has a normal mood and affect. His speech is normal.       ED Course   Procedures  Labs Reviewed   CBC W/ AUTO DIFFERENTIAL - Abnormal; Notable for the following components:       Result Value    RBC 2.95 (*)     Hemoglobin 9.9 (*)     Hematocrit 30.0 (*)      (*)     MCH 33.6 (*)     RDW 15.6 (*)     All other components within normal limits   COMPREHENSIVE METABOLIC PANEL - Abnormal; Notable for the following components:    Sodium 127 (*)     Calcium 8.2 (*)     Albumin 2.8 (*)     Anion Gap 6 (*)     All other components within normal limits   TROPONIN I HIGH SENSITIVITY   B-TYPE NATRIURETIC PEPTIDE     EKG Readings: (Independently Interpreted)   Sinus rhythm no ST elevations or depression 70 beats per minute no significant axis or interval change.  T-waves normal.   ECG Results              EKG 12-lead (In process)  Result time 02/23/23 15:18:23      In process by Interface, Lab In [facility] (02/23/23 15:18:23)                   Narrative:    Test Reason :  R06.02,    Vent. Rate : 070 BPM     Atrial Rate : 070 BPM     P-R Int : 122 ms          QRS Dur : 094 ms      QT Int : 416 ms       P-R-T Axes : -21 -10 -08 degrees     QTc Int : 449 ms    Sinus rhythm with occasional Premature ventricular complexes  Incomplete right bundle branch block  Borderline Abnormal ECG  No previous ECGs available    Referred By: [email or web address]   SELF           Confirmed By:                                   Imaging Results              CTA Chest Non-Coronary (PE Studies) (Final result)  Result time 02/23/23 16:08:07      Final result by Marquise Booker MD (02/23/23 16:08:07)                   Narrative:    CMS MANDATED QUALITY DATA - CT RADIATION  436    All CT scans at this facility utilize dose modulation, iterative reconstruction, and/or weight based dosing when appropriate to reduce radiation dose to as low as reasonably achievable.    Coronal reformatted MIP images were created on an independent workstation, with images stored in the patient's permanent electronic medical record.    CT CHEST ANGIOGRAPHY WITH IV CONTRAST    CLINICAL HISTORY:  72 years Male Pulmonary embolism (PE) suspected, high prob    COMPARISON: Chest radiography same day    FINDINGS: Contrast bolus timing is adequate for evaluation of central pulmonary arteries. There is no central pulmonary embolism. No convincing peripheral pulmonary arterial filling defect is evident within segment subsegmental pulmonary arteries.    Irregular groundglass opacities are present within the right upper lobe. Linear focus of parenchymal airspace disease involving the left lower lobe with distal mucus plugging and reticular nodular densities. Peripheral reticular nodular densities are present within the right lung base. Mild bilateral bronchiectasis. No pleural effusion. No pneumothorax.    Atherosclerotic calcification of the aorta and coronary arteries. Postoperative changes of the posterior mediastinum suggestive of esophagectomy with  gastric pull-through. No pathologically enlarged lymph nodes.    Bone window images show no acute or aggressive osseous abnormality. Status post median sternotomy.    Imaging through the upper abdomen demonstrates distention of the gallbladder. Status post left nephrectomy.    Diffuse body wall edema. Bilateral gynecomastia.    IMPRESSION:    Negative for pulmonary embolism.    Bilateral lower lobe reticular nodular infiltrates, and groundglass opacities in the right upper lobe, suggestive of acute infectious/inflammatory process. Please correlate with pulmonary symptoms.    Linear parenchymal alveolar opacity left lower lobe could represent pneumonia or chronic scarring; however the possibility of neoplasm is not excluded. Follow-up chest CT in 2-3 months is recommended to reassess.    Aortic and coronary atherosclerosis.    Additional incidental findings as above.    Electronically signed by:  Marquise Booker MD  2/23/2023 4:08 PM CST Workstation: [id number]                                     X-Ray Chest PA And Lateral (Final result)  Result time 02/23/23 14:51:04   Procedure changed from X-Ray Chest AP Portable     Final result by Sp Mike MD (02/23/23 14:51:04)                   Narrative:    2 view chest    CLINICAL DATA: Shortness of breath    FINDINGS: PA and lateral views are compared to January 5. Heart size is normal. Coronary arterial stents are noted. There has been previous median sternotomy. Right-sided Port-A-Cath is unchanged in position with tip at superior vena cava. The lungs are clear.    IMPRESSION:  1. No acute process.    Electronically signed by:  Sp Mike MD  2/23/2023 2:51 PM CST Workstation: [id number]                                     Medications   amoxicillin capsule 500 mg (has no administration in time range)   levothyroxine tablet 50 mcg (50 mcg Oral Given 2/24/23 0602)   atorvastatin tablet 10 mg (10 mg Oral Given 2/23/23 2227)   sodium chloride 0.9% flush 3 mL  (has no administration in time range)   melatonin tablet 6 mg (has no administration in time range)   acetaminophen tablet 650 mg (has no administration in time range)   ondansetron injection 4 mg (has no administration in time range)   insulin aspart U-100 pen 0-5 Units (has no administration in time range)   glucose chewable tablet 16 g (has no administration in time range)   glucose chewable tablet 24 g (has no administration in time range)   glucagon (human recombinant) injection 1 mg (has no administration in time range)   dextrose 10% bolus 125 mL 125 mL (has no administration in time range)   dextrose 10% bolus 250 mL 250 mL (has no administration in time range)   albuterol nebulizer solution 2.5 mg (has no administration in time range)   iodixanoL (VISIPAQUE 320) injection 100 mL (100 mLs Intravenous Given 2/23/23 1550)   cefTRIAXone (ROCEPHIN) 1 g/50 mL D5W IVPB (0 g Intravenous Stopped 2/23/23 1741)   azithromycin 500 mg in dextrose 5 % 250 mL IVPB (ready to mix system) (0 mg Intravenous Stopped 2/23/23 1919)     Medical Decision Making:   Initial Assessment:   Seen and examined.  Presents with chief complaint of exertional dyspnea.  Patient has extensive cardiovascular disease history.  My differential diagnosis includes ACS, CHF, pulmonary embolism.  Patient recently stopped dual antiplatelet therapy secondary to dental procedure.  He reports he can not walk 20-30 steps without getting significantly short of breath.  This is an acute episodic process.  His laboratory evaluation was stable.  Brain atretic peptide is normal.  troponin was normal.  No significant electrolyte abnormalities.  EKG did not have evidence of acute ischemia.  Given antiplatelet pausing, my considerations included pulmonary embolism for which a PE study was performed.  This was negative.  Given the degree of his shortness of breath, discussed with Hospital Medicine and they will observe patient for further evaluation and  treatment.                        Clinical Impression:   Final diagnoses:  [R06.02] Shortness of breath        ED Disposition Condition    Observation Stable                Leonard Taylor Jr., MD  02/24/23 5880

## 2023-02-24 NOTE — PLAN OF CARE
Patient cleared for discharge from case management standpoint.       02/24/23 1124   Final Note   Assessment Type Final Discharge Note   Anticipated Discharge Disposition Home   What phone number can be called within the next 1-3 days to see how you are doing after discharge? [phone]   Hospital Resources/Appts/Education Provided Provided education on problems/symptoms using teachback;Appointments scheduled and added to AVS;Provided patient/caregiver with written discharge plan information;Community resources provided

## 2023-02-24 NOTE — PLAN OF CARE
02/24/23 0905   LOWE Message   Medicare Outpatient and Observation Notification regarding financial responsibility Explained to patient/caregiver;Signed/date by patient/caregiver   Date LOWE was signed 02/24/23   Time LOWE was signed 0905

## 2023-02-24 NOTE — CARE UPDATE
02/24/23 1053   Home Oxygen Qualification   $ Home O2 Qualification Tech time 15 minutes   Room Air SpO2 At Rest 96 %   Room Air SpO2 During Ambulation 91 %   SpO2 Post Ambulation 90 %   Post Ambulation Heart Rate 80 bpm

## 2023-02-24 NOTE — PT/OT/SLP PROGRESS
Occupational Therapy      Patient Name:  Mikie Walter JrDeanna   MRN:  [id number]    Patient not seen today secondary to  (ambulating and performing ADLs independently in hospital room.). No Acute OT services needed. Patient back to functional baseline.    2/24/2023

## 2023-02-24 NOTE — PROGRESS NOTES
Automatic Inhaler to Nebulizer Interchange    albuterol (Ventolin, ProAir, or Proventil)  mcg given multiple times per day changed to albuterol 2.5 mg q4h pr Wheezing or SOB  per [facility] Automatic Therapeutic Substitutions Protocol.    Please contact pharmacy at extension [phone] with any questions.     Thank you,   Ofelia Dong

## 2023-02-24 NOTE — CONSULTS
Pulmonary/Critical Care Consult      Patient name: Mikie Walter Jr.  MRN: [id number]  Date: 02/24/2023    Admit Date: 2/23/2023  Consult Requested By: Kayleen Barrett MD    Reason for Consult: CAMACHO    HPI:    2/24/2023 - 73 yo with h/o CAD/CABG, renal cancer (on chemo), esophageal cancer, HTN admitted for evaluation of CAMACHO.  He has no known h/o chronic lung disease.  States that about 1 month ago he had some increased cough and congestion.  CT scan at that time showed some infiltrates (Dr Bunn, his oncologist, was aware).  Starting about 2 weeks ago he developed some exertional dyspnea with no other associated symptoms.  The symptoms is relieved by rest and is not consistent.  He has had no ret symptoms.  He does feels that the episodes have increased over the last 2 weeks.  He has been on cabometyx for his cancer which has dyspnea as a reported side effect but I cannot find any definite association with pneumonitis.  His CTA done in ER shows no PE and the infiltrates are actually improved compared to 1 month ago.  His cough is better as well.  He did not note the dyspnea a month ago.  Had desat study with lowest sat reported at 90%.    Review of Systems    Review of Systems   Constitutional:  Negative for chills, diaphoresis, fever, malaise/fatigue and weight loss.   HENT:  Negative for congestion.    Eyes:  Negative for pain.   Respiratory:  Positive for shortness of breath. Negative for cough, hemoptysis, sputum production, wheezing and stridor.    Cardiovascular:  Negative for chest pain, palpitations, orthopnea, claudication, leg swelling and PND.   Gastrointestinal:  Negative for abdominal pain, blood in stool, constipation, diarrhea, heartburn, nausea and vomiting.   Genitourinary:  Negative for dysuria, frequency, hematuria and urgency.   Musculoskeletal:  Negative for falls and myalgias.   Neurological:  Negative for sensory change, focal weakness and weakness.   Psychiatric/Behavioral:  Negative for  depression, substance abuse and suicidal ideas. The patient is not nervous/anxious.      Past Medical History    Past Medical History:   Diagnosis Date    CAD (coronary artery disease)     Chronic anticoagulation 2023    CKD (chronic kidney disease), stage IV     Diabetes mellitus, type II     Esophageal carcinoma     GERD (gastroesophageal reflux disease)     H/O therapeutic radiation     History of chemotherapy     History of renal cell carcinoma     initial response to pembrolizumab, recurrence in a mediastinal node , on cabozantinib    Hyperlipemia     Hypertension     Renal cell carcinoma 2023    Status post chemoradiation        Past Surgical History    Past Surgical History:   Procedure Laterality Date    CORONARY ARTERY BYPASS GRAFT      X 3    CORONARY STENT PLACEMENT      NEPHRECTOMY Left        Medications (scheduled):      amoxicillin  500 mg Oral TID    atorvastatin  10 mg Oral QHS    levothyroxine  50 mcg Oral Before breakfast       Medications (infusions):         Medications (prn):     acetaminophen, albuterol sulfate, dextrose 10%, dextrose 10%, glucagon (human recombinant), glucose, glucose, insulin aspart U-100, melatonin, ondansetron, sodium chloride 0.9%    Family History: No family history on file.    Social History: Tobacco:   Social History     Tobacco Use   Smoking Status Former    Types: Cigarettes    Quit date: 1965    Years since quittin.1   Smokeless Tobacco Never   Tobacco Comments    >40 years                                EtOH:   Social History     Substance and Sexual Activity   Alcohol Use Yes    Alcohol/week: 6.0 standard drinks    Types: 6 Cans of beer per week    Comment: occasionally                                Drugs:   Social History     Substance and Sexual Activity   Drug Use No                                Occupation: retired                             Asbestos exposure: no                             Pets: na                              Environmental allergies: na    Physical Exam    Vital signs:  Temp:  [97.7 °F (36.5 °C)-99 °F (37.2 °C)]   Pulse:  [62-86]   Resp:  [16-20]   BP: (128-154)/()   SpO2:  [96 %-100 %]     Intake/Output:   Intake/Output Summary (Last 24 hours) at 2/24/2023 1107  Last data filed at 2/23/2023 2353  Gross per 24 hour   Intake 300 ml   Output 1225 ml   Net -925 ml        BMI: Estimated body mass index is 23.26 kg/m² as calculated from the following:    Height as of this encounter: 6' (1.829 m).    Weight as of this encounter: 77.8 kg (171 lb 8.3 oz).    Physical Exam  Vitals and nursing note reviewed.   Constitutional:       General: He is not in acute distress.     Appearance: Normal appearance. He is not ill-appearing, toxic-appearing or diaphoretic.   HENT:      Head: Normocephalic and atraumatic.      Right Ear: External ear normal.      Left Ear: External ear normal.      Nose: Nose normal.      Mouth/Throat:      Mouth: Mucous membranes are moist.      Pharynx: Oropharynx is clear. No oropharyngeal exudate.   Eyes:      General: No scleral icterus.        Right eye: No discharge.         Left eye: No discharge.      Extraocular Movements: Extraocular movements intact.      Conjunctiva/sclera: Conjunctivae normal.      Pupils: Pupils are equal, round, and reactive to light.   Neck:      Vascular: No carotid bruit.      Comments: ? Soft right carotid bruit  Cardiovascular:      Rate and Rhythm: Normal rate and regular rhythm.      Pulses: Normal pulses.      Heart sounds: Normal heart sounds. No murmur heard.    No friction rub. No gallop.   Pulmonary:      Effort: Pulmonary effort is normal. No respiratory distress.      Breath sounds: Normal breath sounds. No stridor. No wheezing, rhonchi or rales.   Chest:      Chest wall: No tenderness.   Abdominal:      General: Bowel sounds are normal. There is no distension.      Tenderness: There is no abdominal tenderness. There is no guarding.   Musculoskeletal:          General: No swelling. Normal range of motion.      Cervical back: Normal range of motion and neck supple. No rigidity or tenderness.      Right lower leg: No edema.      Left lower leg: No edema.   Lymphadenopathy:      Cervical: No cervical adenopathy.   Skin:     General: Skin is warm and dry.      Capillary Refill: Capillary refill takes less than 2 seconds.      Coloration: Skin is not jaundiced.      Findings: No bruising.   Neurological:      General: No focal deficit present.      Mental Status: He is alert and oriented to person, place, and time. Mental status is at baseline.      Cranial Nerves: No cranial nerve deficit.      Sensory: No sensory deficit.      Motor: No weakness.   Psychiatric:         Mood and Affect: Mood normal.         Behavior: Behavior normal.         Thought Content: Thought content normal.         Judgment: Judgment normal.       Laboratory    Recent Labs   Lab 02/24/23  0529   WBC 4.52   RBC 2.79*   HGB 9.3*   HCT 28.5*      *   MCH 33.3*   MCHC 32.6       Recent Labs   Lab 02/24/23  0529   CALCIUM 8.5*   PROT 5.6*   *   K 4.3   CO2 28      BUN 19   CREATININE 0.9   ALKPHOS 61   ALT 18   AST 24   BILITOT 0.4       No results for input(s): PT, INR, APTT in the last 24 hours.    No results for input(s): CPK, CPKMB, TROPONINI, MB in the last 24 hours.    Additional labs: reviewed    Microbiology:       Microbiology Results (last 7 days)       Procedure Component Value Units Date/Time    Blood Culture #2 **CANNOT BE ORDERED STAT** [[id number]] Collected: 02/23/23 1709    Order Status: Completed Specimen: Blood from Peripheral, Forearm, Right Updated: 02/23/23 2358     Blood Culture, Routine No Growth to date    Blood Culture #1 **CANNOT BE ORDERED STAT** [[id number]] Collected: 02/23/23 1659    Order Status: Completed Specimen: Blood from Peripheral, Antecubital, Right Updated: 02/23/23 2358     Blood Culture, Routine No Growth to date            Radiology    CTA  Chest Non-Coronary (PE Studies)  CMS MANDATED QUALITY DATA - CT RADIATION  436    All CT scans at this facility utilize dose modulation, iterative reconstruction, and/or weight based dosing when appropriate to reduce radiation dose to as low as reasonably achievable.    Coronal reformatted MIP images were created on an independent workstation, with images stored in the patient's permanent electronic medical record.    CT CHEST ANGIOGRAPHY WITH IV CONTRAST    CLINICAL HISTORY:  72 years Male Pulmonary embolism (PE) suspected, high prob    COMPARISON: Chest radiography same day    FINDINGS: Contrast bolus timing is adequate for evaluation of central pulmonary arteries. There is no central pulmonary embolism. No convincing peripheral pulmonary arterial filling defect is evident within segment subsegmental pulmonary arteries.    Irregular groundglass opacities are present within the right upper lobe. Linear focus of parenchymal airspace disease involving the left lower lobe with distal mucus plugging and reticular nodular densities. Peripheral reticular nodular densities are present within the right lung base. Mild bilateral bronchiectasis. No pleural effusion. No pneumothorax.    Atherosclerotic calcification of the aorta and coronary arteries. Postoperative changes of the posterior mediastinum suggestive of esophagectomy with gastric pull-through. No pathologically enlarged lymph nodes.    Bone window images show no acute or aggressive osseous abnormality. Status post median sternotomy.    Imaging through the upper abdomen demonstrates distention of the gallbladder. Status post left nephrectomy.    Diffuse body wall edema. Bilateral gynecomastia.    IMPRESSION:    Negative for pulmonary embolism.    Bilateral lower lobe reticular nodular infiltrates, and groundglass opacities in the right upper lobe, suggestive of acute infectious/inflammatory process. Please correlate with pulmonary symptoms.    Linear parenchymal  alveolar opacity left lower lobe could represent pneumonia or chronic scarring; however the possibility of neoplasm is not excluded. Follow-up chest CT in 2-3 months is recommended to reassess.    Aortic and coronary atherosclerosis.    Additional incidental findings as above.    Electronically signed by:  Marquise Booker MD  2/23/2023 4:08 PM CST Workstation: [id number]  X-Ray Chest PA And Lateral  2 view chest    CLINICAL DATA: Shortness of breath    FINDINGS: PA and lateral views are compared to January 5. Heart size is normal. Coronary arterial stents are noted. There has been previous median sternotomy. Right-sided Port-A-Cath is unchanged in position with tip at superior vena cava. The lungs are clear.    IMPRESSION:  1. No acute process.    Electronically signed by:  Sp Mike MD  2/23/2023 2:51 PM CST Workstation: [id number]        Additional Studies    reviewed    Ventilator Information              No results for input(s): PH, PCO2, PO2, HCO3, POCSATURATED, BE in the last 72 hours.      Impression    Active Hospital Problems    Diagnosis  POA    *Dyspnea on exertion [R06.09]  Yes      Resolved Hospital Problems   No resolved problems to display.       Plan    I do not see a pulmonary explanation for his current CAMACHO  The pulmonary infiltrates may have been infectious, are better and he needs a follow up CT in about 2 months  He has been seen by cardiology and I note plans for outpt evaluation for ischemia (has h/o ASCVD with CABD 2011)  Continues follow up with oncology  OK to DC from my standpoint      Thank you for this consult.  I will follow with you while the patient is hospitalized.        Diomedes Hendricks MD  [facility] Pulmonary/Critical Care  02/24/2023

## 2023-02-24 NOTE — CONSULTS
[facility]   Cardiology Note    Consult Requested By: hospital medicine   Reason for Consult: dyspnea    SUBJECTIVE:     History of Present Illness: Pt is a 73 yo CAD, CABG, renal CA, esophageal CA, HTN who presented to he ED with dyspnea that started 3 days ago. He states it gradually worsened over the last few days. Denies chest pain, dizziness, diaphoresis, N/V, palpitations, edema, fevers, chills. Denies shortness of rbeath at rest. His plavix is on hold right now due to dental procedures. ED workup showed EKG with no acute changes, negative troponins. CTA negative for PE but did show chronic infiltrates.     Review of patient's allergies indicates:   Allergen Reactions    Ciprofloxacin Hives and Swelling       Past Medical History:   Diagnosis Date    CAD (coronary artery disease)     Chronic anticoagulation 2023    CKD (chronic kidney disease), stage IV     Diabetes mellitus, type II     Esophageal carcinoma     GERD (gastroesophageal reflux disease)     H/O therapeutic radiation     History of chemotherapy     History of renal cell carcinoma     initial response to pembrolizumab, recurrence in a mediastinal node , on cabozantinib    Hyperlipemia     Hypertension     Renal cell carcinoma 2023    Status post chemoradiation      Past Surgical History:   Procedure Laterality Date    CORONARY ARTERY BYPASS GRAFT      X 3    CORONARY STENT PLACEMENT      NEPHRECTOMY Left      No family history on file.  Social History     Tobacco Use    Smoking status: Former     Types: Cigarettes     Quit date: 1965     Years since quittin.1    Smokeless tobacco: Never    Tobacco comments:     >40 years   Substance Use Topics    Alcohol use: Yes     Alcohol/week: 6.0 standard drinks     Types: 6 Cans of beer per week     Comment: occasionally    Drug use: No       Review of Systems:  Review of Systems   Constitutional:  Negative for chills, diaphoresis and fever.   Respiratory:  Positive for  shortness of breath.    Cardiovascular:  Negative for chest pain, palpitations, orthopnea and leg swelling.   Gastrointestinal:  Negative for nausea and vomiting.   All other systems reviewed and are negative.    OBJECTIVE:     Vital Signs (Most Recent)  Temp: 98.4 °F (36.9 °C) (02/24/23 0700)  Pulse: 86 (02/24/23 0700)  Resp: 20 (02/24/23 0700)  BP: (!) 140/93 (02/24/23 0700)  SpO2: 97 % (02/24/23 0700)    Vital Signs Range (Last 24H):  Temp:  [97.7 °F (36.5 °C)-99 °F (37.2 °C)]   Pulse:  [62-86]   Resp:  [16-20]   BP: (128-154)/()   SpO2:  [96 %-100 %]     I & O (Last 24H):    Intake/Output Summary (Last 24 hours) at 2/24/2023 0910  Last data filed at 2/23/2023 2353  Gross per 24 hour   Intake 300 ml   Output 1225 ml   Net -925 ml       Current Diet:     Current Diet Order   Procedures    Diet Dysphagia Soft (IDDSI Level 6)        Allergies:  Review of patient's allergies indicates:   Allergen Reactions    Ciprofloxacin Hives and Swelling       Meds:  Scheduled Meds:   amoxicillin  500 mg Oral TID    atorvastatin  10 mg Oral QHS    levothyroxine  50 mcg Oral Before breakfast     Continuous Infusions:  PRN Meds:acetaminophen, albuterol sulfate, dextrose 10%, dextrose 10%, glucagon (human recombinant), glucose, glucose, insulin aspart U-100, melatonin, ondansetron, sodium chloride 0.9%    Oxygen/Ventilator Data (Last 24H):  (if applicable)        Hemodynamic Parameters (Last 24H):   (if applicable)        Laboratory and Radiology Data:  Recent Results (from the past 24 hour(s))   CBC auto differential    Collection Time: 02/23/23  2:20 PM   Result Value Ref Range    WBC 5.40 3.90 - 12.70 K/uL    RBC 2.95 (L) 4.60 - 6.20 M/uL    Hemoglobin 9.9 (L) 14.0 - 18.0 g/dL    Hematocrit 30.0 (L) 40.0 - 54.0 %     (H) 82 - 98 fL    MCH 33.6 (H) 27.0 - 31.0 pg    MCHC 33.0 32.0 - 36.0 g/dL    RDW 15.6 (H) 11.5 - 14.5 %    Platelets 168 150 - 450 K/uL    MPV 9.6 9.2 - 12.9 fL    Immature Granulocytes 0.4 0.0 - 0.5 %     Gran # (ANC) 2.8 1.8 - 7.7 K/uL    Immature Grans (Abs) 0.02 0.00 - 0.04 K/uL    Lymph # 2.0 1.0 - 4.8 K/uL    Mono # 0.6 0.3 - 1.0 K/uL    Eos # 0.0 0.0 - 0.5 K/uL    Baso # 0.02 0.00 - 0.20 K/uL    nRBC 0 0 /100 WBC    Gran % 51.3 38.0 - 73.0 %    Lymph % 36.5 18.0 - 48.0 %    Mono % 10.7 4.0 - 15.0 %    Eosinophil % 0.7 0.0 - 8.0 %    Basophil % 0.4 0.0 - 1.9 %    Differential Method Automated    Comprehensive metabolic panel    Collection Time: 02/23/23  2:20 PM   Result Value Ref Range    Sodium 127 (L) 136 - 145 mmol/L    Potassium 4.6 3.5 - 5.1 mmol/L    Chloride 96 95 - 110 mmol/L    CO2 25 23 - 29 mmol/L    Glucose 91 70 - 110 mg/dL    BUN 22 8 - 23 mg/dL    Creatinine 1.1 0.5 - 1.4 mg/dL    Calcium 8.2 (L) 8.7 - 10.5 mg/dL    Total Protein 6.0 6.0 - 8.4 g/dL    Albumin 2.8 (L) 3.5 - 5.2 g/dL    Total Bilirubin 0.7 0.1 - 1.0 mg/dL    Alkaline Phosphatase 58 55 - 135 U/L    AST 28 10 - 40 U/L    ALT 15 10 - 44 U/L    Anion Gap 6 (L) 8 - 16 mmol/L    eGFR >60.0 >60 mL/min/1.73 m^2   Troponin I High Sensitivity    Collection Time: 02/23/23  2:20 PM   Result Value Ref Range    Troponin I High Sensitivity 9.9 0.0 - 14.9 pg/mL   Brain natriuretic peptide    Collection Time: 02/23/23  2:20 PM   Result Value Ref Range    BNP 97 0 - 99 pg/mL   Blood Culture #1 **CANNOT BE ORDERED STAT**    Collection Time: 02/23/23  4:59 PM    Specimen: Peripheral, Antecubital, Right; Blood   Result Value Ref Range    Blood Culture, Routine No Growth to date    Blood Culture #2 **CANNOT BE ORDERED STAT**    Collection Time: 02/23/23  5:09 PM    Specimen: Peripheral, Forearm, Right; Blood   Result Value Ref Range    Blood Culture, Routine No Growth to date    Troponin I High Sensitivity    Collection Time: 02/23/23 10:17 PM   Result Value Ref Range    Troponin I High Sensitivity 9.8 0.0 - 14.9 pg/mL   POCT glucose    Collection Time: 02/23/23 10:19 PM   Result Value Ref Range    POC Glucose 107 70 - 110   Troponin I High  Sensitivity    Collection Time: 02/24/23  5:29 AM   Result Value Ref Range    Troponin I High Sensitivity 10.0 0.0 - 14.9 pg/mL   CBC auto differential    Collection Time: 02/24/23  5:29 AM   Result Value Ref Range    WBC 4.52 3.90 - 12.70 K/uL    RBC 2.79 (L) 4.60 - 6.20 M/uL    Hemoglobin 9.3 (L) 14.0 - 18.0 g/dL    Hematocrit 28.5 (L) 40.0 - 54.0 %     (H) 82 - 98 fL    MCH 33.3 (H) 27.0 - 31.0 pg    MCHC 32.6 32.0 - 36.0 g/dL    RDW 15.5 (H) 11.5 - 14.5 %    Platelets 165 150 - 450 K/uL    MPV 9.6 9.2 - 12.9 fL    Immature Granulocytes 0.2 0.0 - 0.5 %    Gran # (ANC) 1.8 1.8 - 7.7 K/uL    Immature Grans (Abs) 0.01 0.00 - 0.04 K/uL    Lymph # 2.1 1.0 - 4.8 K/uL    Mono # 0.5 0.3 - 1.0 K/uL    Eos # 0.1 0.0 - 0.5 K/uL    Baso # 0.02 0.00 - 0.20 K/uL    nRBC 0 0 /100 WBC    Gran % 40.3 38.0 - 73.0 %    Lymph % 45.8 18.0 - 48.0 %    Mono % 10.2 4.0 - 15.0 %    Eosinophil % 3.1 0.0 - 8.0 %    Basophil % 0.4 0.0 - 1.9 %    Differential Method Automated    Basic metabolic panel    Collection Time: 02/24/23  5:29 AM   Result Value Ref Range    Sodium 135 (L) 136 - 145 mmol/L    Potassium 4.3 3.5 - 5.1 mmol/L    Chloride 102 95 - 110 mmol/L    CO2 28 23 - 29 mmol/L    Glucose 99 70 - 110 mg/dL    BUN 19 8 - 23 mg/dL    Creatinine 0.9 0.5 - 1.4 mg/dL    Calcium 8.5 (L) 8.7 - 10.5 mg/dL    Anion Gap 5 (L) 8 - 16 mmol/L    eGFR >60.0 >60 mL/min/1.73 m^2   Hepatic function panel    Collection Time: 02/24/23  5:29 AM   Result Value Ref Range    Total Protein 5.6 (L) 6.0 - 8.4 g/dL    Albumin 2.8 (L) 3.5 - 5.2 g/dL    Total Bilirubin 0.4 0.1 - 1.0 mg/dL    Bilirubin, Direct 0.2 0.1 - 0.3 mg/dL    AST 24 10 - 40 U/L    ALT 18 10 - 44 U/L    Alkaline Phosphatase 61 55 - 135 U/L   Iron and TIBC    Collection Time: 02/24/23  5:29 AM   Result Value Ref Range    Iron 51 45 - 160 ug/dL    Transferrin 163 (L) 200 - 375 mg/dL    TIBC 228 (L) 250 - 450 ug/dL    Saturated Iron 22 20 - 50 %   Ferritin    Collection Time: 02/24/23   5:29 AM   Result Value Ref Range    Ferritin 111 20.0 - 300.0 ng/mL   Transferrin    Collection Time: 02/24/23  5:29 AM   Result Value Ref Range    Transferrin 163 (L) 200 - 375 mg/dL   Folate    Collection Time: 02/24/23  5:29 AM   Result Value Ref Range    Folate 12.4 4.0 - 24.0 ng/mL   Reticulocytes    Collection Time: 02/24/23  5:29 AM   Result Value Ref Range    Retic 3.4 (H) 0.4 - 2.0 %   POCT glucose    Collection Time: 02/24/23  7:44 AM   Result Value Ref Range    POC Glucose 93 70 - 110     Imaging Results              CTA Chest Non-Coronary (PE Studies) (Final result)  Result time 02/23/23 16:08:07      Final result by Marquise Booker MD (02/23/23 16:08:07)                   Narrative:    CMS MANDATED QUALITY DATA - CT RADIATION  436    All CT scans at this facility utilize dose modulation, iterative reconstruction, and/or weight based dosing when appropriate to reduce radiation dose to as low as reasonably achievable.    Coronal reformatted MIP images were created on an independent workstation, with images stored in the patient's permanent electronic medical record.    CT CHEST ANGIOGRAPHY WITH IV CONTRAST    CLINICAL HISTORY:  72 years Male Pulmonary embolism (PE) suspected, high prob    COMPARISON: Chest radiography same day    FINDINGS: Contrast bolus timing is adequate for evaluation of central pulmonary arteries. There is no central pulmonary embolism. No convincing peripheral pulmonary arterial filling defect is evident within segment subsegmental pulmonary arteries.    Irregular groundglass opacities are present within the right upper lobe. Linear focus of parenchymal airspace disease involving the left lower lobe with distal mucus plugging and reticular nodular densities. Peripheral reticular nodular densities are present within the right lung base. Mild bilateral bronchiectasis. No pleural effusion. No pneumothorax.    Atherosclerotic calcification of the aorta and coronary arteries.  Postoperative changes of the posterior mediastinum suggestive of esophagectomy with gastric pull-through. No pathologically enlarged lymph nodes.    Bone window images show no acute or aggressive osseous abnormality. Status post median sternotomy.    Imaging through the upper abdomen demonstrates distention of the gallbladder. Status post left nephrectomy.    Diffuse body wall edema. Bilateral gynecomastia.    IMPRESSION:    Negative for pulmonary embolism.    Bilateral lower lobe reticular nodular infiltrates, and groundglass opacities in the right upper lobe, suggestive of acute infectious/inflammatory process. Please correlate with pulmonary symptoms.    Linear parenchymal alveolar opacity left lower lobe could represent pneumonia or chronic scarring; however the possibility of neoplasm is not excluded. Follow-up chest CT in 2-3 months is recommended to reassess.    Aortic and coronary atherosclerosis.    Additional incidental findings as above.    Electronically signed by:  Marquise Booker MD  2/23/2023 4:08 PM CST Workstation: [id number]                                     X-Ray Chest PA And Lateral (Final result)  Result time 02/23/23 14:51:04   Procedure changed from X-Ray Chest AP Portable     Final result by Sp Mike MD (02/23/23 14:51:04)                   Narrative:    2 view chest    CLINICAL DATA: Shortness of breath    FINDINGS: PA and lateral views are compared to January 5. Heart size is normal. Coronary arterial stents are noted. There has been previous median sternotomy. Right-sided Port-A-Cath is unchanged in position with tip at superior vena cava. The lungs are clear.    IMPRESSION:  1. No acute process.    Electronically signed by:  Sp Mike MD  2/23/2023 2:51 PM CST Workstation: [id number]                                    12-lead EKG interpretation:  (if applicable)      Current Cardiac Rhythm:   (if applicable)    Physical Exam:   Physical Exam  Vitals and nursing note  reviewed.   Constitutional:       General: He is not in acute distress.     Appearance: Normal appearance. He is not ill-appearing.   Cardiovascular:      Rate and Rhythm: Normal rate and regular rhythm.      Pulses: Normal pulses.      Heart sounds: Normal heart sounds. No murmur heard.  Pulmonary:      Effort: Pulmonary effort is normal. No respiratory distress.      Breath sounds: Normal breath sounds.   Musculoskeletal:      Right lower leg: No edema.      Left lower leg: No edema.   Skin:     General: Skin is warm and dry.      Findings: No rash.   Neurological:      Mental Status: He is alert and oriented to person, place, and time.       ASSESSMENT/PLAN:   Assessment:   Dyspnea - EKG with no acute ST changes, troponins negative. CTA negative for PE, showed chronic infiltrates.   CAD s/p CABG 2011  Hypertension   Hyperlipidemia  Renal CA  Anemia - 9.9/30, plan per primary     Plan:   Discussed patient with Dr. Hurley.   EKG with no acute ST changes, troponins negative.   Patient reports he is feeling better.   Patient can be discharged from a cardiac standpoint. Dr. Hurley would like to see him in clinic next week for discussion of outpatient ischemic workup.     Thank you for your consult.

## 2023-02-24 NOTE — PT/OT/SLP PROGRESS
Physical Therapy      Patient Name:  Mikie FRANCO Saba Yap   MRN:  [id number]    Patient not seen today secondary to  (D/C'ed from [facility]).

## 2023-02-24 NOTE — DISCHARGE SUMMARY
[facility] Medicine  Discharge Summary      Patient Name: Mikie Walter Jr.  MRN: [id number]  ENA: [id number]  Patient Class: OP- Observation  Admission Date: 2/23/2023  Hospital Length of Stay: 0 days  Discharge Date and Time: 2/24/2023 12:49 PM  Attending Physician: No att. providers found   Discharging Provider: Kayleen Barrett MD  Primary Care Provider: Rodrigue Hurley MD    Primary Care Team: Networked reference to record PCT     HPI:   71 yo M with PMH including CAD, CABG, renal ca, esophageal ca, HTN who is here for dyspnea on exertion. Patient states he's been in his usual health - states he's been on his chemotherapy Cabometyx for past year with worsened balance and has had falls but no other concerns. He is presently getting teeth pulled for dentures and his antiplatelets are presently held. States he had a recent cardiology visit as well. For the past 2-3 days however, patient is having increasing shortness of breath on exertion. States he will walk 20-30 feet or less and become increasingly short of breath. This is not his usual. Denies chest pain, dizziness, n/v, diaphoresis, palpitations, cough, fevers/chills. He also notes it does not occur every time but has been worse and more frequent. In the ED, vitals stable, ekg wnl, lab work pertinent for anemia, and CTA neg for PE but does show nonspecific and chronic infiltrates. He was given a dose of antibiotics. Hospitalist contacted for admission given patient's risk factors and degree of dyspnea.      * No surgery found *      Hospital Course:   Patient presented with shortness of breath on exertion.  Known history of CAD status post CABG, hypertension, history of esophageal cancer, history of renal cancer with known left lower lung metastasis followed at [facility].  He is followed by cardiologist Dr. Hurley.  States his antiplatelet have been held recently due to dental extractions.  States shortness of breath at times  is intermittent, denies any associated symptoms.  He was on room air, CTA no PE but bilateral lower lung changes with atelectasis, left lower lung findings  which patient is already followed for at .  He was admitted with Pulmonary and Cardiology consultation.  He states his last echo was in November 2022 at cardiology office.  On 02/24, states he feels improved, states he discussed with Pulmonary and Cardiology and was told he would be discharged, states he will follow-up with Dr. Hurley on Monday. As per Cardiology note, they are planning outpatient ischemic evaluation. Wife is also present at bedside.  Patient requesting discharge.  Discharge was reviewed with patient, he will continue medications as he was taking prior to admission, he will follow-up closely with outpatient Cardiology, return precautions reviewed.      Discharge examination   Sitting side of bed, areas of bruising noted, on room air       Goals of Care Treatment Preferences:  Code Status: Full Code      Consults:   Consults (From admission, onward)        Status Ordering Provider     Inpatient consult to Cardiology  Once        Provider:  Rodrigue Hurley MD    Completed JENI HAMILTON     Inpatient consult to Pulmonology  Once        Provider:  Genesis Lagunas MD    Completed JENI HAMILTON          No notes have been filed under this hospital service.  Service: Hospital Medicine    Final Active Diagnoses:    Diagnosis Date Noted POA    PRINCIPAL PROBLEM:  Dyspnea on exertion [R06.09] 02/23/2023 Yes    Hx of CABG [Z95.1] 02/24/2023 Not Applicable     Chronic    Pulmonary metastases [C78.00] 02/24/2023 Yes     Chronic    Renal cell carcinoma [C64.9] 01/05/2023 Yes    Coronary artery disease involving native coronary artery of native heart without angina pectoris [I25.10] 07/20/2017 Yes    H/O esophagectomy [Z98.890, Z90.49] 07/19/2017 Not Applicable    Mixed hyperlipidemia [E78.2] 06/19/2017 Yes      Problems Resolved  During this Admission:    Diagnosis Date Noted Date Resolved POA    Hyponatremia [E87.1] 02/24/2023 02/24/2023 Yes       Discharged Condition: fair    Disposition: Home or Self Care    Follow Up:   Follow-up Information     Rodrigue Hurley MD Follow up.    Specialties: Cardiology, Interventional Cardiology  Why: Mar 1, 2023 at 12:30pm  Contact information:  [address] [address]  [phone]             Jeff Bunn MD Follow up in 2 week(s).    Specialty: Hematology and Oncology  Why: Mar 3, 2023 at 11:00am  Contact information:  [address]   [address]  [facility]  [place] [address]  [phone]                       Patient Instructions:      Diet Cardiac     Notify your health care provider if you experience any of the following:  temperature >100.4     Notify your health care provider if you experience any of the following:  difficulty breathing or increased cough     Activity as tolerated       Significant Diagnostic Studies: Labs:   BMP:   Recent Labs   Lab 02/23/23  1420 02/24/23  0529   GLU 91 99   * 135*   K 4.6 4.3   CL 96 102   CO2 25 28   BUN 22 19   CREATININE 1.1 0.9   CALCIUM 8.2* 8.5*   , CMP   Recent Labs   Lab 02/23/23  1420 02/24/23  0529   * 135*   K 4.6 4.3   CL 96 102   CO2 25 28   GLU 91 99   BUN 22 19   CREATININE 1.1 0.9   CALCIUM 8.2* 8.5*   PROT 6.0 5.6*   ALBUMIN 2.8* 2.8*   BILITOT 0.7 0.4   ALKPHOS 58 61   AST 28 24   ALT 15 18   ANIONGAP 6* 5*   , CBC   Recent Labs   Lab 02/23/23  1420 02/24/23  0529   WBC 5.40 4.52   HGB 9.9* 9.3*   HCT 30.0* 28.5*    165   , INR   Lab Results   Component Value Date    INR 1.0 01/05/2023   , Lipid Panel No results found for: CHOL, HDL, LDLCALC, TRIG, CHOLHDL, Troponin No results for input(s): TROPONINI in the last 168 hours. and A1C:   Recent Labs   Lab 01/13/23  1009   HGBA1C 5.7       Pending Diagnostic Studies:     Procedure Component Value Units Date/Time    Methylmalonic  acid, serum [[id number]] Collected: 02/24/23 0529    Order Status: Sent Lab Status: In process Updated: 02/24/23 0607    Specimen: Blood     Narrative:      Collection has been rescheduled by EL3 at 02/24/2023 04:30 Reason: Pt   not in room       X-Ray Chest PA And Lateral    Result Date: 2/23/2023  2 view chest CLINICAL DATA: Shortness of breath FINDINGS: PA and lateral views are compared to January 5. Heart size is normal. Coronary arterial stents are noted. There has been previous median sternotomy. Right-sided Port-A-Cath is unchanged in position with tip at superior vena cava. The lungs are clear. IMPRESSION: 1. No acute process. Electronically signed by:  Sp Mike MD  2/23/2023 2:51 PM CST Workstation: [id number]    CTA Chest Non-Coronary (PE Studies)    Result Date: 2/23/2023  CMS MANDATED QUALITY DATA - CT RADIATION  436 All CT scans at this facility utilize dose modulation, iterative reconstruction, and/or weight based dosing when appropriate to reduce radiation dose to as low as reasonably achievable. Coronal reformatted MIP images were created on an independent workstation, with images stored in the patient's permanent electronic medical record. CT CHEST ANGIOGRAPHY WITH IV CONTRAST CLINICAL HISTORY: 72 years Male Pulmonary embolism (PE) suspected, high prob COMPARISON: Chest radiography same day FINDINGS: Contrast bolus timing is adequate for evaluation of central pulmonary arteries. There is no central pulmonary embolism. No convincing peripheral pulmonary arterial filling defect is evident within segment subsegmental pulmonary arteries. Irregular groundglass opacities are present within the right upper lobe. Linear focus of parenchymal airspace disease involving the left lower lobe with distal mucus plugging and reticular nodular densities. Peripheral reticular nodular densities are present within the right lung base. Mild bilateral bronchiectasis. No pleural effusion. No pneumothorax.  Atherosclerotic calcification of the aorta and coronary arteries. Postoperative changes of the posterior mediastinum suggestive of esophagectomy with gastric pull-through. No pathologically enlarged lymph nodes. Bone window images show no acute or aggressive osseous abnormality. Status post median sternotomy. Imaging through the upper abdomen demonstrates distention of the gallbladder. Status post left nephrectomy. Diffuse body wall edema. Bilateral gynecomastia. IMPRESSION: Negative for pulmonary embolism. Bilateral lower lobe reticular nodular infiltrates, and groundglass opacities in the right upper lobe, suggestive of acute infectious/inflammatory process. Please correlate with pulmonary symptoms. Linear parenchymal alveolar opacity left lower lobe could represent pneumonia or chronic scarring; however the possibility of neoplasm is not excluded. Follow-up chest CT in 2-3 months is recommended to reassess. Aortic and coronary atherosclerosis. Additional incidental findings as above. Electronically signed by:  Marquise Booker MD  2/23/2023 4:08 PM [facility] Workstation: [id number]    Medications:  Reconciled Home Medications:      Medication List      CONTINUE taking these medications    amLODIPine 5 MG tablet  Commonly known as: NORVASC  Take 1 tablet (5 mg total) by mouth once daily.     amoxicillin 500 MG Tab  Commonly known as: AMOXIL  Take 500 mg by mouth 3 (three) times daily.     aspirin 81 MG EC tablet  Commonly known as: ECOTRIN  Take 1 tablet (81 mg total) by mouth once daily.     cabozantinib 60 mg Tab  Commonly known as: CABOMETYX  Take 60 mg by mouth once daily.     clopidogreL 75 mg tablet  Commonly known as: PLAVIX  Take 75 mg by mouth once daily.     HYDROcodone-acetaminophen 5-325 mg per tablet  Commonly known as: NORCO  Take 1 tablet by mouth every 6 (six) hours as needed.     levothyroxine 50 MCG tablet  Commonly known as: SYNTHROID  Take 50 mcg by mouth before breakfast.     simvastatin 20 MG  tablet  Commonly known as: ZOCOR  Take 1 tablet (20 mg total) by mouth every evening.        STOP taking these medications    diphenoxylate-atropine 2.5-0.025 mg 2.5-0.025 mg per tablet  Commonly known as: LOMOTIL     DULoxetine 30 MG capsule  Commonly known as: CYMBALTA     insulin detemir U-100 100 unit/mL (3 mL) Inpn pen  Commonly known as: LEVEMIR FLEXTOUCH U-100 INSULN     metoprolol succinate 100 MG 24 hr tablet  Commonly known as: TOPROL-XL     naproxen 500 MG tablet  Commonly known as: NAPROSYN     pantoprazole 40 MG tablet  Commonly known as: PROTONIX     prasugreL 10 mg Tab  Commonly known as: EFFIENT     tamsulosin 0.4 mg Cap  Commonly known as: FLOMAX     triamterene-hydrochlorothiazide 37.5-25 mg 37.5-25 mg per capsule  Commonly known as: DYAZIDE     zolpidem 10 mg Tab  Commonly known as: AMBIEN            Indwelling Lines/Drains at time of discharge:   Lines/Drains/Airways     Central Venous Catheter Line  Duration                Port A Cath Single Lumen   right subclavian -- days                Time spent on the discharge of patient: 32 minutes         Kayleen Barrett MD  Department of Hospital Medicine  [facility]

## 2023-02-24 NOTE — PLAN OF CARE
[facility]  Initial Discharge Assessment       Primary Care Provider: Rodrigue Hurley MD    Admission Diagnosis: Shortness of breath [R06.02]    Admission Date: 2/23/2023  Expected Discharge Date: 2/25/2023    Discharge Barriers Identified: None    Payor: MEDICARE / Plan: MEDICARE PART A & B / Product Type: Government /     Extended Emergency Contact Information  Primary Emergency Contact: SabaSawti  Address[address] [address] [facility]  Home Phone: [phone]  Mobile Phone: [phone]  Relation: Spouse  Preferred language: English   needed? No  Secondary Emergency Contact: Shivam Walter  Mobile Phone: [phone]  Relation: Son  Preferred language: English   needed? No    Discharge Plan A: Home, Home with family  Discharge Plan B: [id number] - [place] [facility] - [address] AT [facility] [address]  [address] [address]  Phone: [phone] Fax: [phone]    SW met with patient and patient's spouse Swati Walter at bedside to complete discharge planning assessment.  Patient alert and oriented xs 4.  Patient verified all demographic information on facesheet is correct.  Patient verified PCP is Dr. Hurley (cardiologist).  Patient verified primary health insurance is Medicare and secondary is [facility].  Patient with NO home health or DME.  Patient with POA (spouse) and Living Will.  Patient not on dialysis or medication coumadin.  Patient states he is on Plavix.  Patient with no 30 day admission.  Patient with no financial issues at this time.  Patient family will provide transportation upon discharge from facility.  Patient independent with ADLs, live with spouse, drives self.      Initial Assessment (most recent)       Adult Discharge Assessment - 02/24/23 0905          Discharge Assessment    Assessment Type Discharge Planning Assessment     Confirmed/corrected  address, phone number and insurance Yes     Confirmed Demographics Correct on Facesheet     Source of Information patient;family     Does patient/caregiver understand observation status Yes     Communicated FLOYD with patient/caregiver Yes     People in Home spouse     Facility Arrived From: home     Do you expect to return to your current living situation? Yes     Do you have help at home or someone to help you manage your care at home? Yes     Who are your caregiver(s) and their phone number(s)? spouse     Prior to hospitilization cognitive status: Alert/Oriented     Current cognitive status: Alert/Oriented     Readmission within 30 days? No     Patient currently being followed by outpatient case management? No     Do you currently have service(s) that help you manage your care at home? No     Do you take prescription medications? Yes     Do you have prescription coverage? Yes     Do you have any problems affording any of your prescribed medications? No     Is the patient taking medications as prescribed? yes     Who is going to help you get home at discharge? spouse     How do you get to doctors appointments? car, drives self     Are you on dialysis? No     Do you take coumadin? No   Plavix    Discharge Plan A Home;Home with family     Discharge Plan B Home Health     DME Needed Upon Discharge  none     Discharge Plan discussed with: Patient     Discharge Barriers Identified None        Physical Activity    On average, how many days per week do you engage in moderate to strenuous exercise (like a brisk walk)? 3 days     On average, how many minutes do you engage in exercise at this level? 60 min        Financial Resource Strain    How hard is it for you to pay for the very basics like food, housing, medical care, and heating? Not hard at all        Housing Stability    In the last 12 months, was there a time when you did not have a steady place to sleep or slept in a shelter (including now)? No         Transportation Needs    In the past 12 months, has lack of transportation kept you from medical appointments or from getting medications? No     In the past 12 months, has lack of transportation kept you from meetings, work, or from getting things needed for daily living? No        Food Insecurity    Within the past 12 months, you worried that your food would run out before you got the money to buy more. Never true     Within the past 12 months, the food you bought just didn't last and you didn't have money to get more. Never true        Stress    Do you feel stress - tense, restless, nervous, or anxious, or unable to sleep at night because your mind is troubled all the time - these days? Not at all        Social Connections    In a typical week, how many times do you talk on the phone with family, friends, or neighbors? More than three times a week     How often do you get together with friends or relatives? More than three times a week     How often do you attend [employer] or [employer] services? More than 4 times per year     Do you belong to any clubs or organizations such as [employer] groups, unions, fraternal or athletic groups, or school groups? Yes     How often do you attend meetings of the clubs or organizations you belong to? More than 4 times per year     Are you , , , , never , or living with a partner?         Alcohol Use    Q1: How often do you have a drink containing alcohol? Patient refused     Q2: How many drinks containing alcohol do you have on a typical day when you are drinking? Patient refused     Q3: How often do you have six or more drinks on one occasion? Patient refused

## 2023-02-24 NOTE — HPI
73 yo M with PMH including CAD, CABG, renal ca, esophageal ca, HTN who is here for dyspnea on exertion. Patient states he's been in his usual health - states he's been on his chemotherapy Cabometyx for past year with worsened balance and has had falls but no other concerns. He is presently getting teeth pulled for dentures and his antiplatelets are presently held. States he had a recent cardiology visit as well. For the past 2-3 days however, patient is having increasing shortness of breath on exertion. States he will walk 20-30 feet or less and become increasingly short of breath. This is not his usual. Denies chest pain, dizziness, n/v, diaphoresis, palpitations, cough, fevers/chills. He also notes it does not occur every time but has been worse and more frequent. In the ED, vitals stable, ekg wnl, lab work pertinent for anemia, and CTA neg for PE but does show nonspecific and chronic infiltrates. He was given a dose of antibiotics. Hospitalist contacted for admission given patient's risk factors and degree of dyspnea.

## 2023-02-24 NOTE — HOSPITAL COURSE
Patient presented with shortness of breath on exertion.  Known history of CAD status post CABG, hypertension, history of esophageal cancer, history of renal cancer with known left lower lung metastasis followed at [facility].  He is followed by cardiologist Dr. Hurley.  States his antiplatelet have been held recently due to dental extractions.  States shortness of breath at times is intermittent, denies any associated symptoms.  He was on room air, CTA no PE but bilateral lower lung changes with atelectasis, left lower lung findings  which patient is already followed for at .  He was admitted with Pulmonary and Cardiology consultation.  He states his last echo was in November 2022 at cardiology office.  On 02/24, states he feels improved, states he discussed with Pulmonary and Cardiology and was told he would be discharged, states he will follow-up with Dr. Hurley on Monday. As per Cardiology note, they are planning outpatient ischemic evaluation. Wife is also present at bedside.  Patient requesting discharge.  Discharge was reviewed with patient, he will continue medications as he was taking prior to admission, he will follow-up closely with outpatient Cardiology, return precautions reviewed.      Discharge examination   Sitting side of bed, areas of bruising noted, on room air

## 2023-02-24 NOTE — SUBJECTIVE & OBJECTIVE
Past Medical History:   Diagnosis Date    CAD (coronary artery disease)     Chronic anticoagulation 01/05/2023    CKD (chronic kidney disease), stage IV     Diabetes mellitus, type II     Esophageal carcinoma     GERD (gastroesophageal reflux disease)     H/O therapeutic radiation     History of chemotherapy     History of renal cell carcinoma     initial response to pembrolizumab, recurrence in a mediastinal node 6/22, on cabozantinib    Hyperlipemia     Hypertension     Renal cell carcinoma 01/05/2023    Status post chemoradiation        Past Surgical History:   Procedure Laterality Date    CORONARY ARTERY BYPASS GRAFT      X 3    CORONARY STENT PLACEMENT      NEPHRECTOMY Left        Review of patient's allergies indicates:   Allergen Reactions    Ciprofloxacin Hives and Swelling       No current facility-administered medications on file prior to encounter.     Current Outpatient Medications on File Prior to Encounter   Medication Sig    amlodipine (NORVASC) 5 MG tablet Take 1 tablet (5 mg total) by mouth once daily.    amoxicillin (AMOXIL) 500 MG Tab Take 500 mg by mouth 3 (three) times daily.    cabozantinib (CABOMETYX) 60 mg Tab Take 60 mg by mouth once daily.    levothyroxine (SYNTHROID) 50 MCG tablet Take 50 mcg by mouth before breakfast.    simvastatin (ZOCOR) 20 MG tablet Take 1 tablet (20 mg total) by mouth every evening.    aspirin (ECOTRIN) 81 MG EC tablet Take 1 tablet (81 mg total) by mouth once daily. (Patient not taking: Reported on 2/23/2023)    clopidogrel (PLAVIX) 75 mg tablet Take 75 mg by mouth once daily.    diphenoxylate-atropine 2.5-0.025 mg (LOMOTIL) 2.5-0.025 mg per tablet Take 1 tablet by mouth.    DULoxetine (CYMBALTA) 30 MG capsule Take 30 mg by mouth 3 (three) times daily.    HYDROcodone-acetaminophen (NORCO) 5-325 mg per tablet Take 1 tablet by mouth every 6 (six) hours as needed.    insulin detemir U-100 (LEVEMIR FLEXTOUCH U-100 INSULN) 100 unit/mL (3 mL) SubQ InPn pen Administer 10  units if FBG >150 (Patient taking differently: Inject 10 Units into the skin once daily. Administer 10 units if FBG >150)    metoprolol succinate (TOPROL-XL) 100 MG 24 hr tablet Take 100 mg by mouth once daily.    naproxen (NAPROSYN) 500 MG tablet Take 1 tablet (500 mg total) by mouth 2 (two) times daily as needed (pain).    pantoprazole (PROTONIX) 40 MG tablet Take 1 tablet (40 mg total) by mouth once daily.    prasugrel (EFFIENT) 10 mg Tab Take 1 tablet (10 mg total) by mouth once daily.    tamsulosin (FLOMAX) 0.4 mg Cap Take 0.4 mg by mouth once daily.    triamterene-hydrochlorothiazide 37.5-25 mg (DYAZIDE) 37.5-25 mg per capsule Take 1 capsule by mouth every morning.    zolpidem (AMBIEN) 10 mg Tab Take 10 mg by mouth every evening.     Family History    None       Tobacco Use    Smoking status: Former     Types: Cigarettes     Quit date: 1965     Years since quittin.1    Smokeless tobacco: Never    Tobacco comments:     >40 years   Substance and Sexual Activity    Alcohol use: Yes     Alcohol/week: 6.0 standard drinks     Types: 6 Cans of beer per week     Comment: occasionally    Drug use: No    Sexual activity: Not on file     Review of Systems   Constitutional:  Negative for chills and fever.   HENT:  Negative for congestion and sore throat.    Respiratory:  Positive for shortness of breath. Negative for cough.    Cardiovascular:  Negative for chest pain and palpitations.   Gastrointestinal:  Negative for diarrhea and nausea.   Genitourinary:  Negative for dysuria and flank pain.   Musculoskeletal:  Positive for gait problem. Negative for back pain.   Skin:  Negative for rash and wound.   Neurological:  Negative for dizziness and headaches.   Hematological:  Negative for adenopathy. Bruises/bleeds easily.   Psychiatric/Behavioral:  Negative for confusion and hallucinations.    Objective:     Vital Signs (Most Recent):  Temp: 98 °F (36.7 °C) (23 1357)  Pulse: 62 (23 1919)  Resp: 16  (02/23/23 1750)  BP: (!) 141/83 (02/23/23 1919)  SpO2: 96 % (02/23/23 1919)   Vital Signs (24h Range):  Temp:  [98 °F (36.7 °C)] 98 °F (36.7 °C)  Pulse:  [62-79] 62  Resp:  [16-18] 16  SpO2:  [96 %-99 %] 96 %  BP: (128-153)/(83-92) 141/83     Weight: 79.4 kg (175 lb)  Body mass index is 23.73 kg/m².    Physical Exam  Vitals reviewed.   Constitutional:       General: He is not in acute distress.     Appearance: He is not toxic-appearing.   HENT:      Head: Normocephalic and atraumatic.      Mouth/Throat:      Comments: dentures  Eyes:      Extraocular Movements: Extraocular movements intact.      Conjunctiva/sclera: Conjunctivae normal.   Cardiovascular:      Rate and Rhythm: Normal rate and regular rhythm.   Pulmonary:      Breath sounds: No wheezing or rhonchi.   Abdominal:      General: Bowel sounds are normal.      Palpations: Abdomen is soft.   Musculoskeletal:      Cervical back: No rigidity or tenderness.      Right lower leg: No edema.      Left lower leg: No edema.   Skin:     General: Skin is warm and dry.      Findings: Bruising present.   Neurological:      General: No focal deficit present.      Mental Status: He is oriented to person, place, and time.   Psychiatric:         Mood and Affect: Mood normal.         Judgment: Judgment normal.           Significant Labs: All pertinent labs within the past 24 hours have been reviewed.    Significant Imaging: I have reviewed all pertinent imaging results/findings within the past 24 hours.

## 2023-02-24 NOTE — H&P
[facility] - Emergency Dept  Hospital Medicine  History & Physical    Patient Name: Mikie Walter Jr.  MRN: [id number]  Patient Class: OP- Observation  Admission Date: 2/23/2023  Attending Physician: Holly Knutson MD   Primary Care Provider: Rodrigue Hurley MD         Patient information was obtained from patient, spouse/SO, past medical records and ER records.     Subjective:     Principal Problem:Dyspnea on exertion    Chief Complaint:   Chief Complaint   Patient presents with    Shortness of Breath     On exertion x 2 days         HPI: 71 yo M with PMH including CAD, CABG, renal ca, esophageal ca, HTN who is here for dyspnea on exertion. Patient states he's been in his usual health - states he's been on his chemotherapy Cabometyx for past year with worsened balance and has had falls but no other concerns. He is presently getting teeth pulled for dentures and his antiplatelets are presently held. States he had a recent cardiology visit as well. For the past 2-3 days however, patient is having increasing shortness of breath on exertion. States he will walk 20-30 feet or less and become increasingly short of breath. This is not his usual. Denies chest pain, dizziness, n/v, diaphoresis, palpitations, cough, fevers/chills. He also notes it does not occur every time but has been worse and more frequent. In the ED, vitals stable, ekg wnl, lab work pertinent for anemia, and CTA neg for PE but does show nonspecific and chronic infiltrates. He was given a dose of antibiotics. Hospitalist contacted for admission given patient's risk factors and degree of dyspnea.      Past Medical History:   Diagnosis Date    CAD (coronary artery disease)     Chronic anticoagulation 01/05/2023    CKD (chronic kidney disease), stage IV     Diabetes mellitus, type II     Esophageal carcinoma     GERD (gastroesophageal reflux disease)     H/O therapeutic radiation     History of chemotherapy     History of renal  cell carcinoma     initial response to pembrolizumab, recurrence in a mediastinal node 6/22, on cabozantinib    Hyperlipemia     Hypertension     Renal cell carcinoma 01/05/2023    Status post chemoradiation        Past Surgical History:   Procedure Laterality Date    CORONARY ARTERY BYPASS GRAFT      X 3    CORONARY STENT PLACEMENT      NEPHRECTOMY Left        Review of patient's allergies indicates:   Allergen Reactions    Ciprofloxacin Hives and Swelling       No current facility-administered medications on file prior to encounter.     Current Outpatient Medications on File Prior to Encounter   Medication Sig    amlodipine (NORVASC) 5 MG tablet Take 1 tablet (5 mg total) by mouth once daily.    amoxicillin (AMOXIL) 500 MG Tab Take 500 mg by mouth 3 (three) times daily.    cabozantinib (CABOMETYX) 60 mg Tab Take 60 mg by mouth once daily.    levothyroxine (SYNTHROID) 50 MCG tablet Take 50 mcg by mouth before breakfast.    simvastatin (ZOCOR) 20 MG tablet Take 1 tablet (20 mg total) by mouth every evening.    aspirin (ECOTRIN) 81 MG EC tablet Take 1 tablet (81 mg total) by mouth once daily. (Patient not taking: Reported on 2/23/2023)    clopidogrel (PLAVIX) 75 mg tablet Take 75 mg by mouth once daily.    diphenoxylate-atropine 2.5-0.025 mg (LOMOTIL) 2.5-0.025 mg per tablet Take 1 tablet by mouth.    DULoxetine (CYMBALTA) 30 MG capsule Take 30 mg by mouth 3 (three) times daily.    HYDROcodone-acetaminophen (NORCO) 5-325 mg per tablet Take 1 tablet by mouth every 6 (six) hours as needed.    insulin detemir U-100 (LEVEMIR FLEXTOUCH U-100 INSULN) 100 unit/mL (3 mL) SubQ InPn pen Administer 10 units if FBG >150 (Patient taking differently: Inject 10 Units into the skin once daily. Administer 10 units if FBG >150)    metoprolol succinate (TOPROL-XL) 100 MG 24 hr tablet Take 100 mg by mouth once daily.    naproxen (NAPROSYN) 500 MG tablet Take 1 tablet (500 mg total) by mouth 2 (two) times daily as  needed (pain).    pantoprazole (PROTONIX) 40 MG tablet Take 1 tablet (40 mg total) by mouth once daily.    prasugrel (EFFIENT) 10 mg Tab Take 1 tablet (10 mg total) by mouth once daily.    tamsulosin (FLOMAX) 0.4 mg Cap Take 0.4 mg by mouth once daily.    triamterene-hydrochlorothiazide 37.5-25 mg (DYAZIDE) 37.5-25 mg per capsule Take 1 capsule by mouth every morning.    zolpidem (AMBIEN) 10 mg Tab Take 10 mg by mouth every evening.     Family History    None       Tobacco Use    Smoking status: Former     Types: Cigarettes     Quit date: 1965     Years since quittin.1    Smokeless tobacco: Never    Tobacco comments:     >40 years   Substance and Sexual Activity    Alcohol use: Yes     Alcohol/week: 6.0 standard drinks     Types: 6 Cans of beer per week     Comment: occasionally    Drug use: No    Sexual activity: Not on file     Review of Systems   Constitutional:  Negative for chills and fever.   HENT:  Negative for congestion and sore throat.    Respiratory:  Positive for shortness of breath. Negative for cough.    Cardiovascular:  Negative for chest pain and palpitations.   Gastrointestinal:  Negative for diarrhea and nausea.   Genitourinary:  Negative for dysuria and flank pain.   Musculoskeletal:  Positive for gait problem. Negative for back pain.   Skin:  Negative for rash and wound.   Neurological:  Negative for dizziness and headaches.   Hematological:  Negative for adenopathy. Bruises/bleeds easily.   Psychiatric/Behavioral:  Negative for confusion and hallucinations.    Objective:     Vital Signs (Most Recent):  Temp: 98 °F (36.7 °C) (23 1357)  Pulse: 62 (23)  Resp: 16 (23 1750)  BP: (!) 141/83 (23)  SpO2: 96 % (23)   Vital Signs (24h Range):  Temp:  [98 °F (36.7 °C)] 98 °F (36.7 °C)  Pulse:  [62-79] 62  Resp:  [16-18] 16  SpO2:  [96 %-99 %] 96 %  BP: (128-153)/(83-92) 141/83     Weight: 79.4 kg (175 lb)  Body mass index is 23.73  kg/m².    Physical Exam  Vitals reviewed.   Constitutional:       General: He is not in acute distress.     Appearance: He is not toxic-appearing.   HENT:      Head: Normocephalic and atraumatic.      Mouth/Throat:      Comments: dentures  Eyes:      Extraocular Movements: Extraocular movements intact.      Conjunctiva/sclera: Conjunctivae normal.   Cardiovascular:      Rate and Rhythm: Normal rate and regular rhythm.   Pulmonary:      Breath sounds: No wheezing or rhonchi.   Abdominal:      General: Bowel sounds are normal.      Palpations: Abdomen is soft.   Musculoskeletal:      Cervical back: No rigidity or tenderness.      Right lower leg: No edema.      Left lower leg: No edema.   Skin:     General: Skin is warm and dry.      Findings: Bruising present.   Neurological:      General: No focal deficit present.      Mental Status: He is oriented to person, place, and time.   Psychiatric:         Mood and Affect: Mood normal.         Judgment: Judgment normal.           Significant Labs: All pertinent labs within the past 24 hours have been reviewed.    Significant Imaging: I have reviewed all pertinent imaging results/findings within the past 24 hours.    Assessment/Plan:     Dyspnea on exertion  Rule out ACS, anginal equivalent  Rule out underlying arrhythmia  Ruled out PE  CT with infiltrates and ground glass opacities, rule out infectious   Subacute on chronic anemia   Hx of CAD s/p CABG  Renal Ca  Esophageal Ca  DM  Hypothyroidism  HTN  Dental procedure  Physical deconditioning and gait instability    -Unless becomes necessary, patient would like to continue holding off on antiplatelets so he can return to complete his dental procedure  -I did discuss if change in troponins or occurrence of chest pain, benefits vs risk may necessitate resuming  -Also will continue home amoxicillin for planned dental procedures outpatient  -Telemetry  -Trend troponins  -Consult cardiology  -S/p antibiotics - will defer further  abx for now - follow clinically - fever, wbc, procal  -PRN bronchodilator  -Defer steroids   -Unclear if chemotherapy agent is playing a role - on hold since yesterday per patient   -Patient is having CT chest outpatient already to monitor - radiology recommends repeat in 2-3 months  -Consult pulmonary  -PT/OT   -ISS, accuchecks, and otherwise continue home meds  -Anemia panel added to AM labs - worsened anemia, denies any GI bleed or losses outside of dental procedure    FULL CODE  DVT ppx Defer for now, add if prolonged hospitalization    Holly Knutson MD  Department of Hospital Medicine   [facility] - Emergency Dept

## 2023-02-28 LAB
BACTERIA BLD CULT: NORMAL
BACTERIA BLD CULT: NORMAL
METHLYMALONIC ACID: 1185 NMOL/L (ref 0–378)

## 2025-04-17 ENCOUNTER — OFFICE VISIT (OUTPATIENT)
Dept: PULMONOLOGY | Facility: CLINIC | Age: 75
End: 2025-04-17
Payer: MEDICARE

## 2025-04-17 VITALS
OXYGEN SATURATION: 97 % | WEIGHT: 153.69 LBS | SYSTOLIC BLOOD PRESSURE: 132 MMHG | BODY MASS INDEX: 20.84 KG/M2 | DIASTOLIC BLOOD PRESSURE: 85 MMHG | HEART RATE: 87 BPM

## 2025-04-17 DIAGNOSIS — R06.09 DOE (DYSPNEA ON EXERTION): Primary | ICD-10-CM

## 2025-04-17 PROCEDURE — 99213 OFFICE O/P EST LOW 20 MIN: CPT | Mod: PBBFAC,PN

## 2025-04-17 PROCEDURE — 99999 PR PBB SHADOW E&M-EST. PATIENT-LVL III: CPT | Mod: PBBFAC,,,

## 2025-04-17 PROCEDURE — 99205 OFFICE O/P NEW HI 60 MIN: CPT | Mod: S$PBB,,,

## 2025-04-17 NOTE — PROGRESS NOTES
History and Physical Note  [facility]    Subjective:     Reason for visit: Shortness of breath      Patient ID: Mikie Walter Jr. is a 74 y.o. male.    Interval History 2025:    Patient presents today as a new patient with shortness of breath  He reports experiencing shortness of breath with activity for the past 4-5 years, which resolves immediately upon sitting down and resting. He experiences chest tightness associated with these episodes. He denies wheezing.    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 He also has stage 4 renal cell carcinoma with mediastinal lymph node involvement confirmed by biopsy. CT showed stable ground glass opacities in the right upper lobe.    He was recently hospitalized for 15 days at [facility] following UTI symptoms while in [place]. After starting antibiotics, he developed confusion and hallucinations, including hand movements, incoherent speech, and visual hallucinations.    He reports chronic sinus problems with significant post nasal drip, particularly in the mornings, accompanied by episodes of hacking, sneezing, and nasal congestion upon waking.    He is a former smoker from  to . He reports previous asbestos exposure during early years of employment.    His brother  of stomach cancer.    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 Well-developed. Well-nourished.  Eyes: EOMI. Sclerae  anicteric.  HENT: Normocephalic. Atraumatic. Nares patent. Moist oral mucosa.  Ears: Bilateral TMs clear. Bilateral EACs clear.  Cardiovascular: Regular rate. Regular rhythm. No murmurs. No rubs. No gallops. Normal S1, S2.  Respiratory: Normal respiratory effort. Clear to auscultation bilaterally. No rales. No rhonchi. No wheezing.  Abdomen: Soft. Non-tender. Non-distended. Normoactive bowel sounds.  Musculoskeletal: No  obvious deformity.  Extremities: No lower extremity edema.  Neurological: Alert & oriented x3. No slurred speech. Normal gait.  Psychiatric: Normal mood. Normal affect. Good insight. Good judgment.  Skin: Warm. Dry. No rash.             Personal Diagnostic Review and Interpretation  CTA chest report from OSH: images not available   Unchanged right upper lobe predominant ill-defined groundglass changes and left lower lobe pleural parenchymal scarring/atelectasis.   Lungs: Scattered subsegmental atelectasis. Unchanged scattered groundglass and peribronchial vascular septal thickening most prominent in the right upper lobe. Stable left lower lobe linear area of pleural-parenchymal scarring/atelectasis. Additional stable scattered 1 to 2 mm micronodules.   Pertinent Studies Reviewed & Interpreted:     Pulmonary Function Tests:   pending    6 Minute Walk Tests:   pending    Other Pertinent Laboratories:  Lab Results   Component Value Date    WBC 4.4 (L) 04/11/2025    RBC 2.79 (L) 02/24/2023    HGB 11.6 (L) 04/11/2025    MCV 94.9 04/11/2025    MCH 31.5 04/11/2025    MCHC 33.2 04/11/2025    RDW 15.8 (H) 04/11/2025     02/24/2023    MPV 7.1 (L) 04/11/2025    GRAN 1.8 02/24/2023    GRAN 40.3 02/24/2023    LYMPH 2.1 02/24/2023    LYMPH 45.8 02/24/2023    MONO 0.5 02/24/2023    MONO 10.2 02/24/2023    EOS 0.1 02/24/2023    BASO 0.02 02/24/2023       Assessment:       1. CAMACHO (dyspnea on exertion)      Plan:       CAMACHO (dyspnea on exertion)  -     Complete PFT with bronchodilator; Future      Assessment &  Plan    R06.09 CAMACHO (dyspnea on exertion)    IMPRESSION:  - Reviewed history of malignant carcinoid tumor of thyroid and renal cell carcinoma.  - Assessed CT from 2023 showing ground glass opacity in right upper lobe, likely representing scarring.  - Considered differential diagnoses for shortness of breath, including potential lung disease or airway issues.  - Opted to defer additional CT imaging pending upcoming oncology appointment     CAMACHO (DYSPNEA ON EXERTION):  - Explained ground glass opacity on CT, describing its appearance and potential causes.  - Ordered pulmonary function test (PFT) to assess lung function and potential benefit from inhalers.  - Follow up after completion of PFT to review results and discuss further management.         RETURN TO CLINIC AFTER TESTS   This note was generated with the assistance of ambient listening technology. Verbal consent was obtained by the patient and accompanying visitor(s) for the recording of patient appointment to facilitate this note. I attest to having reviewed and edited the generated note for accuracy, though some syntax or spelling errors may persist. Please contact the author of this note for any clarification.    Total professional time spent for the encounter: 60 minutes  Time was spent preparing to see the patient, reviewing results of prior testing, obtaining and/or reviewing separately obtained history, performing a medically appropriate examination and interview, counseling and educating the patient/family, ordering medications/tests/procedures, referring and communicating with other health care professionals, documenting clinical information in the electronic health record, and independently interpreting results.    Monique Chavez DNP          [1]   Social History  Tobacco Use   Smoking Status Former    Current packs/day: 0.00    Average packs/day: 1 pack/day for 4.0 years (4.0 ttl pk-yrs)    Types: Cigarettes    Start date: 1968    Quit date: 1972     Years since quittin.3   Smokeless Tobacco Never   Tobacco Comments    >40 years

## 2025-04-23 ENCOUNTER — OFFICE VISIT (OUTPATIENT)
Dept: PULMONOLOGY | Facility: CLINIC | Age: 75
End: 2025-04-23
Payer: MEDICARE

## 2025-04-23 VITALS
DIASTOLIC BLOOD PRESSURE: 88 MMHG | SYSTOLIC BLOOD PRESSURE: 135 MMHG | WEIGHT: 153.13 LBS | BODY MASS INDEX: 20.77 KG/M2 | OXYGEN SATURATION: 100 % | HEART RATE: 50 BPM

## 2025-04-23 DIAGNOSIS — R06.09 DOE (DYSPNEA ON EXERTION): Primary | ICD-10-CM

## 2025-04-23 DIAGNOSIS — R91.8 GROUND GLASS OPACITY PRESENT ON IMAGING OF LUNG: ICD-10-CM

## 2025-04-23 PROCEDURE — 99213 OFFICE O/P EST LOW 20 MIN: CPT | Mod: PBBFAC,PN

## 2025-04-23 PROCEDURE — 99214 OFFICE O/P EST MOD 30 MIN: CPT | Mod: S$PBB,,,

## 2025-04-23 PROCEDURE — 99999 PR PBB SHADOW E&M-EST. PATIENT-LVL III: CPT | Mod: PBBFAC,,,

## 2025-04-23 NOTE — PROGRESS NOTES
History and Physical Note  [facility]    Subjective:     Reason for visit: Shortness of breath      Patient ID: Mikie Walter Jr. is a 74 y.o. male.    Interval History 2025:  Patient presents today for PFT results and evaluation of shortness of breath.   He reports experiencing shortness of breath with activity for the past 4-5 years, which resolves immediately upon sitting down and resting. He experiences chest tightness associated with these episodes. He denies wheezing.    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 He also has stage 4 renal cell carcinoma with mediastinal lymph node involvement confirmed by biopsy. CT showed stable ground glass opacities in the right upper lobe.    He was recently hospitalized for 15 days at [facility] following UTI symptoms while in [place]. After starting antibiotics, he developed confusion and hallucinations, including hand movements, incoherent speech, and visual hallucinations.    He reports chronic sinus problems with significant post nasal drip, particularly in the mornings, accompanied by episodes of hacking, sneezing, and nasal congestion upon waking.    He is a former smoker from  to . He reports previous asbestos exposure during early years of employment.    His brother  of stomach cancer.    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      Appearance: He is not diaphoretic.   HENT:      Head: Normocephalic and atraumatic.   Eyes:      General:         Right eye: No discharge.         Left eye: No discharge.      Conjunctiva/sclera: Conjunctivae normal.   Cardiovascular:      Rate and Rhythm: Normal rate and regular rhythm.      Heart sounds: No murmur heard.     No friction rub. No gallop.   Pulmonary:      Effort: Pulmonary effort is normal.      Breath sounds: Normal breath sounds.   Abdominal:      General: Bowel sounds are normal.      Palpations: Abdomen is soft.      Tenderness: There is no abdominal tenderness.   Musculoskeletal:         General: Normal range of motion.   Skin:     Capillary Refill: Capillary refill takes less than 2 seconds.   Neurological:      Cranial Nerves: No cranial nerve deficit.       Personal Diagnostic Review and Interpretation  CTA chest report from OSH: images not available   Unchanged right upper lobe predominant ill-defined groundglass changes and left lower lobe pleural parenchymal scarring/atelectasis.   Lungs: Scattered subsegmental atelectasis. Unchanged scattered groundglass and peribronchial vascular septal thickening most prominent in the right upper lobe. Stable left lower lobe linear area of pleural-parenchymal scarring/atelectasis. Additional stable scattered 1 to 2 mm micronodules.   Pertinent Studies Reviewed & Interpreted:     Pulmonary Function Tests:   04/2025  No obstruction   No restriction  Mildly decreased diffusing capacity       Other Pertinent Laboratories:  Lab Results   Component Value Date    WBC 4.4 (L) 04/11/2025    RBC 2.79 (L) 02/24/2023    HGB 11.6 (L) 04/11/2025    MCV 94.9 04/11/2025    MCH 31.5 04/11/2025    MCHC 33.2 04/11/2025    RDW 15.8 (H) 04/11/2025     02/24/2023    MPV 7.1 (L) 04/11/2025    GRAN 1.8 02/24/2023    GRAN 40.3 02/24/2023    LYMPH 2.1 02/24/2023    LYMPH 45.8 02/24/2023    MONO 0.5 02/24/2023    MONO 10.2 02/24/2023    EOS 0.1 02/24/2023    BASO 0.02  02/24/2023       Assessment:       1. CAMACHO (dyspnea on exertion)    2. Ground glass opacity present on imaging of lung        Plan:       CAMACHO (dyspnea on exertion)    Ground glass opacity present on imaging of lung        Assessment & Plan    R06.09 CAMACHO (dyspnea on exertion)    IMPRESSION:  - Reviewed history of malignant carcinoid tumor of thyroid and renal cell carcinoma.  - Assessed CT from 2023 showing ground glass opacity in right upper lobe, likely representing scarring.  - Considered differential diagnoses for shortness of breath, including potential lung disease or airway issues.  - Opted to defer additional CT imaging pending upcoming oncology appointment     CAMACHO (DYSPNEA ON EXERTION):  Pulmonary function testing (PFT) demonstrates normal spirometry, normal lung volumes, and preserved airway mechanics, indicating no evidence of obstructive or restrictive physiology. The diffusing capacity is noted to have a mild monthly decline without clear clinical significance at this time.  We will continue to monitor the CT findings with serial imaging as clinically indicated.  Given the normal PFT results and imaging findings, the patients shortness of breath does not appear to be related to intrinsic pulmonary disease at this time. No new pulmonary diagnosis was made today.  Shortness of breath may be multifactorial, possibly related to recent deconditioning following a recent viral illness or other non-pulmonary factors. The patient is encouraged to continue working with physical and occupational therapy (PT/OT) to support functional recovery and improve conditioning.  RETURN TO CLINIC for worsening symptoms or failure to improve   This note was generated with the assistance of ambient listening technology. Verbal consent was obtained by the patient and accompanying visitor(s) for the recording of patient appointment to facilitate this note. I attest to having reviewed and edited the generated note for accuracy,  though some syntax or spelling errors may persist. Please contact the author of this note for any clarification.    Total professional time spent for the encounter: 30 minutes  Time was spent preparing to see the patient, reviewing results of prior testing, obtaining and/or reviewing separately obtained history, performing a medically appropriate examination and interview, counseling and educating the patient/family, ordering medications/tests/procedures, referring and communicating with other health care professionals, documenting clinical information in the electronic health record, and independently interpreting results.    Monique Chavez DNP        [1]   Social History  Tobacco Use   Smoking Status Former    Current packs/day: 0.00    Average packs/day: 1 pack/day for 4.0 years (4.0 ttl pk-yrs)    Types: Cigarettes    Start date:     Quit date:     Years since quittin.3   Smokeless Tobacco Never   Tobacco Comments    >40 years

## (undated) DEVICE — SUT 3-0 12-18IN SILK

## (undated) DEVICE — GOWN SURGICAL X-LARGE

## (undated) DEVICE — SOL CLEARIFY VISUALIZATION LAP

## (undated) DEVICE — SUT 0 VICRYL / UR6 (J603)

## (undated) DEVICE — SHEATH ENDOWRIST 45MM

## (undated) DEVICE — SUT PROLENE 3-0 SH-1 1/2

## (undated) DEVICE — NDL SPINAL 18GX3.5 SPINOCAN

## (undated) DEVICE — ENDOSTITCH POLYSORB 2-0 ES-9

## (undated) DEVICE — KIT ROBOTIC 4 ARM DA VINCI SI

## (undated) DEVICE — EVACUATOR WOUND BULB 100CC

## (undated) DEVICE — SOL NS 1000CC

## (undated) DEVICE — TRAY MINOR GEN SURG

## (undated) DEVICE — NDL INSUF ULTRA VERESS 120MM

## (undated) DEVICE — SUT 0 18IN SILK BLK BRAIDE

## (undated) DEVICE — TRAY CATH UM FOLEY SIL W 16FR

## (undated) DEVICE — SUT 0 ETHILON EN-2 48IN

## (undated) DEVICE — IRRIGATOR ENDOSCOPY DISP.

## (undated) DEVICE — CONTAINER SPECIMEN STRL 4OZ

## (undated) DEVICE — RELOAD ENDO GIA TRISTAPLE 45MM

## (undated) DEVICE — SEALER ENDOWRIST ONE VESSEL

## (undated) DEVICE — SUT PROLENE 3-0 SH DA 36 BL

## (undated) DEVICE — TUBE PENROSE DRAIN 18INX5/8IN

## (undated) DEVICE — NDL HYPO A BEVEL 22X1 1/2

## (undated) DEVICE — PACK UNIVERSAL SPLIT II

## (undated) DEVICE — PACK DRAPE UNIVERSAL CONVERTOR

## (undated) DEVICE — GOWN SMART IMP BREATHABLE XXLG

## (undated) DEVICE — DRESSING TRANS 2X2 TEGADERM

## (undated) DEVICE — SUT PROLENE 2-0 30 SH

## (undated) DEVICE — Device

## (undated) DEVICE — SEE MEDLINE ITEM 146313

## (undated) DEVICE — CATH URETHRAL RED RUBBER 12FR

## (undated) DEVICE — CORD BIPOLAR 12 FOOT

## (undated) DEVICE — SPONGE DERMACEA GAUZE 4X4

## (undated) DEVICE — SUT VICRYL 3-0 27 SH

## (undated) DEVICE — STAPLER SKIN PROXIMATE WIDE

## (undated) DEVICE — SUT 2/0 30IN SILK BLK BRAI

## (undated) DEVICE — COVER LIGHT HANDLE

## (undated) DEVICE — INTRODUCER LEAD 14FR

## (undated) DEVICE — TROCAR ENDOPATH XCEL 12X100MM

## (undated) DEVICE — RELOAD ENDOWRIST GREEN 45MM

## (undated) DEVICE — CHLORAPREP 10.5 ML APPLICATOR

## (undated) DEVICE — BLADE SURG CARBON STEEL SZ11

## (undated) DEVICE — RELOAD ENDOWRIST BLUE 45MM

## (undated) DEVICE — DRAIN CHANNEL ROUND 19FR

## (undated) DEVICE — TROCAR ENDOPATH XCEL 12MM 10CM

## (undated) DEVICE — CANNULA ENDOWRIST SEAL

## (undated) DEVICE — LUBRICANT SURGILUBE 2 OZ

## (undated) DEVICE — SUT MONOCRYL 4-0 PS-2

## (undated) DEVICE — ENDOSTITCH INSTRUMENT

## (undated) DEVICE — INSERT HARMONIC ACE CURVED

## (undated) DEVICE — SET DECANTER MEDICHOICE

## (undated) DEVICE — STAPLER HANDLE XL 26 CM

## (undated) DEVICE — DRAPE SCOPE PILLOW WARMER

## (undated) DEVICE — TUBE SET SINGLE LUMEN FILTERED

## (undated) DEVICE — STAPLES RELOAD GIA UNIV 42-2.4

## (undated) DEVICE — SUT CHROMIC 2-0 SH 27IN BRN

## (undated) DEVICE — ELECTRODE REM PLYHSV RETURN 9

## (undated) DEVICE — GAUZE SPONGE PEANUT STRL

## (undated) DEVICE — ADHESIVE DERMABOND ADVANCED

## (undated) DEVICE — SUT SILK 2-0 SH 18IN BLACK

## (undated) DEVICE — SYR 30CC LUER LOCK

## (undated) DEVICE — DRAPE STERI

## (undated) DEVICE — TROCAR ENDOPATH EXCEL DILATING

## (undated) DEVICE — DRAIN CHEST DRY SUCTION

## (undated) DEVICE — SUT ETHILON 2-0 PSLX 30IN

## (undated) DEVICE — SUT ENDOLOOP PDSII 18 LIGA

## (undated) DEVICE — DRAPE STERI INSTRUMENT 1018

## (undated) DEVICE — SCISSOR 5MMX35CM DIRECT DRIVE

## (undated) DEVICE — DRAIN CHAN RND HUBLS 8MM 24FR